# Patient Record
Sex: FEMALE | Race: WHITE | NOT HISPANIC OR LATINO | Employment: FULL TIME | URBAN - METROPOLITAN AREA
[De-identification: names, ages, dates, MRNs, and addresses within clinical notes are randomized per-mention and may not be internally consistent; named-entity substitution may affect disease eponyms.]

---

## 2017-07-20 ENCOUNTER — CONVERSION ENCOUNTER (OUTPATIENT)
Dept: MAMMOGRAPHY | Facility: HOSPITAL | Age: 42
End: 2017-07-20

## 2017-10-23 ENCOUNTER — CONVERSION ENCOUNTER (OUTPATIENT)
Dept: MAMMOGRAPHY | Facility: HOSPITAL | Age: 42
End: 2017-10-23

## 2019-05-14 ENCOUNTER — OFFICE VISIT CONVERTED (OUTPATIENT)
Dept: SURGERY | Facility: CLINIC | Age: 44
End: 2019-05-14
Attending: SURGERY

## 2019-08-09 ENCOUNTER — HOSPITAL ENCOUNTER (OUTPATIENT)
Dept: URGENT CARE | Facility: CLINIC | Age: 44
Discharge: HOME | End: 2019-08-09

## 2019-11-07 ENCOUNTER — HOSPITAL ENCOUNTER (OUTPATIENT)
Dept: MAMMOGRAPHY | Facility: HOSPITAL | Age: 44
Discharge: HOME | End: 2019-11-07
Attending: INTERNAL MEDICINE

## 2019-11-25 ENCOUNTER — HOSPITAL ENCOUNTER (OUTPATIENT)
Dept: GENERAL RADIOLOGY | Facility: HOSPITAL | Age: 44
Discharge: HOME | End: 2019-11-25
Attending: OBSTETRICS & GYNECOLOGY

## 2020-07-14 ENCOUNTER — HOSPITAL ENCOUNTER (OUTPATIENT)
Dept: PHYSICAL THERAPY | Facility: CLINIC | Age: 45
Setting detail: RECURRING SERIES
Discharge: HOME | End: 2020-08-21
Attending: INTERNAL MEDICINE

## 2020-12-31 ENCOUNTER — TELEPHONE CONVERTED (OUTPATIENT)
Dept: UROLOGY | Facility: CLINIC | Age: 45
End: 2020-12-31
Attending: UROLOGY

## 2020-12-31 ENCOUNTER — HOSPITAL ENCOUNTER (OUTPATIENT)
Dept: LAB | Facility: HOSPITAL | Age: 45
Discharge: HOME | End: 2020-12-31
Attending: UROLOGY

## 2020-12-31 LAB
APPEARANCE UR: CLEAR
BILIRUB UR QL: NEGATIVE
COLOR UR: YELLOW
CONV BACTERIA: ABNORMAL
CONV COLLECTION SOURCE (UA): ABNORMAL
CONV HYALINE CASTS IN URINE MICRO: ABNORMAL /[LPF]
CONV UROBILINOGEN IN URINE BY AUTOMATED TEST STRIP: 0.2 {EHRLICHU}/DL (ref 0.1–1)
GLUCOSE UR QL: NEGATIVE MG/DL
HGB UR QL STRIP: ABNORMAL
KETONES UR QL STRIP: NEGATIVE MG/DL
LEUKOCYTE ESTERASE UR QL STRIP: NEGATIVE
NITRITE UR QL STRIP: NEGATIVE
PH UR STRIP.AUTO: 6.5 [PH] (ref 5–8)
PROT UR QL: NEGATIVE MG/DL
RBC #/AREA URNS HPF: ABNORMAL /[HPF]
SP GR UR: 1.01 (ref 1–1.03)
SQUAMOUS SPT QL MICRO: ABNORMAL /[HPF]
WBC #/AREA URNS HPF: ABNORMAL /[HPF]

## 2021-01-02 LAB — BACTERIA UR CULT: NORMAL

## 2021-01-06 ENCOUNTER — HOSPITAL ENCOUNTER (OUTPATIENT)
Dept: PREADMISSION TESTING | Facility: HOSPITAL | Age: 46
Discharge: HOME | End: 2021-01-06
Attending: UROLOGY

## 2021-01-07 LAB — SARS-COV-2 RNA SPEC QL NAA+PROBE: NOT DETECTED

## 2021-01-11 ENCOUNTER — HOSPITAL ENCOUNTER (OUTPATIENT)
Dept: PERIOP | Facility: HOSPITAL | Age: 46
Setting detail: HOSPITAL OUTPATIENT SURGERY
Discharge: HOME | End: 2021-01-11
Attending: UROLOGY

## 2021-01-11 LAB — HCG UR QL: NEGATIVE

## 2021-01-19 LAB
COLOR STONE: NORMAL
COMPN STONE: NORMAL
CONV CA OXALATE MONOHYDRATE: 100 %
CONV CALCULI COMMENT: NORMAL
CONV CALCULI DISCLAIMER: NORMAL
CONV CALCULI NOTE: NORMAL
CONV PHOTO (CALCULI): NORMAL
SIZE STONE: NORMAL MM
WT STONE: 74 MG

## 2021-01-21 ENCOUNTER — HOSPITAL ENCOUNTER (OUTPATIENT)
Dept: MAMMOGRAPHY | Facility: HOSPITAL | Age: 46
Discharge: HOME | End: 2021-01-21
Attending: INTERNAL MEDICINE

## 2021-01-22 ENCOUNTER — PROCEDURE VISIT CONVERTED (OUTPATIENT)
Dept: UROLOGY | Facility: CLINIC | Age: 46
End: 2021-01-22
Attending: UROLOGY

## 2021-02-23 ENCOUNTER — HOSPITAL ENCOUNTER (OUTPATIENT)
Dept: CT IMAGING | Facility: HOSPITAL | Age: 46
Discharge: HOME | End: 2021-02-23
Attending: UROLOGY

## 2021-02-24 ENCOUNTER — CONVERSION ENCOUNTER (OUTPATIENT)
Dept: SURGERY | Facility: CLINIC | Age: 46
End: 2021-02-24

## 2021-02-24 ENCOUNTER — OFFICE VISIT CONVERTED (OUTPATIENT)
Dept: UROLOGY | Facility: CLINIC | Age: 46
End: 2021-02-24
Attending: UROLOGY

## 2021-03-16 ENCOUNTER — TELEPHONE CONVERTED (OUTPATIENT)
Dept: UROLOGY | Facility: CLINIC | Age: 46
End: 2021-03-16
Attending: UROLOGY

## 2021-03-18 ENCOUNTER — HOSPITAL ENCOUNTER (OUTPATIENT)
Dept: PREADMISSION TESTING | Facility: HOSPITAL | Age: 46
Discharge: HOME | End: 2021-03-18
Attending: UROLOGY

## 2021-03-18 LAB
ALBUMIN SERPL-MCNC: 4.5 G/DL (ref 3.5–5)
ALBUMIN/GLOB SERPL: 1.5 {RATIO} (ref 1.4–2.6)
ALP SERPL-CCNC: 81 U/L (ref 42–98)
ALT SERPL-CCNC: 10 U/L (ref 10–40)
ANION GAP SERPL CALC-SCNC: 14 MMOL/L (ref 8–19)
AST SERPL-CCNC: 14 U/L (ref 15–50)
BASOPHILS # BLD AUTO: 0.03 10*3/UL (ref 0–0.2)
BASOPHILS NFR BLD AUTO: 0.4 % (ref 0–3)
BILIRUB SERPL-MCNC: 0.41 MG/DL (ref 0.2–1.3)
BUN SERPL-MCNC: 8 MG/DL (ref 5–25)
BUN/CREAT SERPL: 11 {RATIO} (ref 6–20)
CALCIUM SERPL-MCNC: 9.5 MG/DL (ref 8.7–10.4)
CHLORIDE SERPL-SCNC: 100 MMOL/L (ref 99–111)
CONV ABS IMM GRAN: 0.02 10*3/UL (ref 0–0.2)
CONV CO2: 27 MMOL/L (ref 22–32)
CONV IMMATURE GRAN: 0.2 % (ref 0–1.8)
CONV TOTAL PROTEIN: 7.5 G/DL (ref 6.3–8.2)
CREAT UR-MCNC: 0.71 MG/DL (ref 0.5–0.9)
DEPRECATED RDW RBC AUTO: 45 FL (ref 36.4–46.3)
EOSINOPHIL # BLD AUTO: 0.03 10*3/UL (ref 0–0.7)
EOSINOPHIL # BLD AUTO: 0.4 % (ref 0–7)
ERYTHROCYTE [DISTWIDTH] IN BLOOD BY AUTOMATED COUNT: 13.2 % (ref 11.7–14.4)
GFR SERPLBLD BASED ON 1.73 SQ M-ARVRAT: >60 ML/MIN/{1.73_M2}
GLOBULIN UR ELPH-MCNC: 3 G/DL (ref 2–3.5)
GLUCOSE SERPL-MCNC: 101 MG/DL (ref 65–99)
HCG UR QL: NEGATIVE
HCT VFR BLD AUTO: 42.4 % (ref 37–47)
HGB BLD-MCNC: 14 G/DL (ref 12–16)
LYMPHOCYTES # BLD AUTO: 3.71 10*3/UL (ref 1–5)
LYMPHOCYTES NFR BLD AUTO: 43.8 % (ref 20–45)
MCH RBC QN AUTO: 30.8 PG (ref 27–31)
MCHC RBC AUTO-ENTMCNC: 33 G/DL (ref 33–37)
MCV RBC AUTO: 93.4 FL (ref 81–99)
MONOCYTES # BLD AUTO: 0.38 10*3/UL (ref 0.2–1.2)
MONOCYTES NFR BLD AUTO: 4.5 % (ref 3–10)
NEUTROPHILS # BLD AUTO: 4.3 10*3/UL (ref 2–8)
NEUTROPHILS NFR BLD AUTO: 50.7 % (ref 30–85)
NRBC CBCN: 0 % (ref 0–0.7)
OSMOLALITY SERPL CALC.SUM OF ELEC: 282 MOSM/KG (ref 273–304)
PLATELET # BLD AUTO: 314 10*3/UL (ref 130–400)
PMV BLD AUTO: 10.3 FL (ref 9.4–12.3)
POTASSIUM SERPL-SCNC: 4.1 MMOL/L (ref 3.5–5.3)
RBC # BLD AUTO: 4.54 10*6/UL (ref 4.2–5.4)
SODIUM SERPL-SCNC: 137 MMOL/L (ref 135–147)
WBC # BLD AUTO: 8.47 10*3/UL (ref 4.8–10.8)

## 2021-03-19 LAB — SARS-COV-2 RNA SPEC QL NAA+PROBE: NOT DETECTED

## 2021-03-20 LAB — BACTERIA UR CULT: NORMAL

## 2021-04-01 ENCOUNTER — OFFICE VISIT CONVERTED (OUTPATIENT)
Dept: UROLOGY | Facility: CLINIC | Age: 46
End: 2021-04-01
Attending: UROLOGY

## 2021-04-15 ENCOUNTER — OFFICE VISIT CONVERTED (OUTPATIENT)
Dept: UROLOGY | Facility: CLINIC | Age: 46
End: 2021-04-15
Attending: UROLOGY

## 2021-04-15 ENCOUNTER — CONVERSION ENCOUNTER (OUTPATIENT)
Dept: SURGERY | Facility: CLINIC | Age: 46
End: 2021-04-15

## 2021-05-10 NOTE — H&P
"   History and Physical      Patient Name: Geri Bowie   Patient ID: [id number]   Sex: Female   YOB: 1975    Primary Care Provider: Nicole Tapia MD   Referring Provider: Nicole Tapia MD    Visit Date: December 31, 2020    Provider: Emily Sanders MD   [place] [facility]   Location Address: [address] [place] [address]  [id number]   Location Phone: [phone]          Chief Complaint     Presents with urologic concern    Telephone Visit- presents for evaluation via telephone.   Verbal consent obtained before beginning visit.   The following staff were present during this visit: Nilam Hopkins LPN  Total time for encounter: 15 minutes       History Of Present Illness     45-year-old lady presents for further evaluation after ER presentation for left-sided back pain.  Diagnosed with distal 3 mm left ureteral stone. Instructed to follow-up with urology.  Patient thought she was \"over it.\"  However her symptoms returned approximately a week and a half later and she has been dealing with significant intermittent pain ever since.  Thinks that stone is still there.  Complains of severe urinary urgency, pressure with mal odor for the past 2 days. Also c/o right sided pain but will change to left-sided pain on occasion.  Describes pain as moderate to severe at times; intermittent; sharp, stabbing; denies significant radiation.  No exacerbating factors.  No associated fever, no nausea.     No prior h/o nephrolithiasis.    ________________  CT abdomen pelvis, without contrast, 12/5/2020:  3 mm calculus in the distal right ureter without significant hydronephrosis; Left nephrolithiasis. 1.8 cm partially exophytic indeterminate mass anteriorly in the lower pole of the right kidney.  This is indeterminate without intravenous contrast.  Recommend an MRI of the abdomen with and without intravenous gadolinium for further evaluation.    Labs, 12/5/2020:  WBC 9.99  Creatinine: " 0.71  UA TNTC RBCs, negative nitrite, negative leukocyte esterase (no culture sent)       Past Medical History  Allergic rhinitis, chronic; Ankle fracture, right, closed, with routine healing, subsequent encounter; Blood In Stool; Breast lump; Closed displaced oblique fracture of shaft of right tibia with routine healing, subsequent encounter; Headache; High cholesterol; Hypothyroidism; Nausea; Other closed fracture of distal end of right fibula with routine healing, subsequent encounter; Pain: Ankle         Past Surgical History  Ankle surgery; Appendectomy; Colon; Colonoscopy         Medication List  Stelara 45 mg/0.5 mL subcutaneous syringe; Vitamin D2 1,250 mcg (50,000 unit) oral capsule         Allergy List  PENICILLINS       Allergies Reconciled  Family Medical History  -None; -Father's Family History Unknown; -Mother's Family History Unknown; *No Known Family History         Social History  Alcohol (Current some day); Caffeine (Current - status unknown); Second hand smoke exposure (Current some day); Tobacco (Current every day)         Review of Systems  · Constitutional  o Denies  o : fever, chills  · Gastrointestinal  o Denies  o : nausea, vomiting          Results       [facility]      PACS RADIOLOGY REPORT    Patient: KALYAN CUMMINGS Acct: #[id number] Report: #[id number]    UNIT #: [id number]  DOS: 20 1549  INSURANCE:[employer] ORDER #:[id number]  LOCATION:[facility]   : 1975    PROVIDERS  ADMITTING:   ATTENDING:   FAMILY:  Nicole Tapia ORDERING:  GAYLE BERNAL   OTHER:  DICTATING:  Ankit Macdonald MD, IV    REQ #:[id number]   EXAM:ABDPELWO - CT ABDOMEN PELVIS wo CONTRAST  REASON FOR EXAM:  Abdominal Pain  REASON FOR VISIT:  ANNEX-CRUZ/LWL LUNG PN    *******Signed******     PROCEDURE: CT ABDOMEN PELVIS WITHOUT CONTRAST     COMPARISON: [facility], US, PELVIC TRANSVAGINAL, 2019, 12:35.  [facility]  [facility], CT, ABDOMEN-PELVIS W, 10/19/2007, 19:41.     INDICATIONS: Abdominal Pain     PROTOCOL:   Standard imaging protocol performed      RADIATION:   DLP: 534.5mGy*cm    Automated exposure control was utilized to minimize radiation dose.      TECHNIQUE: Axial images of the abdomen and pelvis without intravenous or oral contrast.      FINDINGS:     ABDOMEN:  The lung bases are clear.  There are hypodense hepatic masses measuring up to 2 cm.  They are   probably cysts.  The spleen, pancreas and adrenal glands are normal.  There is a 1.8 cm partially   exophytic indeterminate mass anteriorly in the lower pole of the right kidney.  There are a few   calcifications in the left kidney measuring 1 mm and 7 mm.  There are no inflammatory changes   around the gallbladder.       PELVIS:  There is a 3 mm calculus in the distal right ureter without significant hydronephrosis.  Prior   appendectomy.  No evidence of bowel obstruction, perforation or abscess.  There is a 1.4 cm area of   calcification posteriorly in the uterine cervix.  No CT evidence of colitis.  The abdominal aorta   has a normal caliber.  No acute osseous abnormalities are identified.     IMPRESSION:       1. 3 mm calculus in the distal right ureter without significant hydronephrosis.     2. Left nephrolithiasis.     3. 1.8 cm partially exophytic indeterminate mass anteriorly in the lower pole of the right kidney.    This is indeterminate without intravenous contrast.  Recommend an MRI of the abdomen with and   without intravenous gadolinium for further evaluation.     4. 1.4 cm area of calcification posteriorly in the uterine cervix.  Recommend an MRI of the pelvis   with and without intravenous gadolinium for further evaluation.          AGUSTINA SMITH MD         Electronically Signed and Approved By: AGUSTINA SMITH MD on 12/05/2020 at 16:59                     Until signed, this is an unconfirmed preliminary report that may contain  errors and is  subject to change.                CYNDI:  D:12/05/20 1659         Assessment  · Ureteral stone     592.1/N20.1  · Nephrolithiasis     592.0/N20.0  · Dysuria     788.1/R30.0    Problems Reconciled  Plan  · Orders  o Physician Telephone Evaluation, 11-20 minutes (66860) - 592.1/N20.1, 592.0/N20.0, 788.1/R30.0 - 12/31/2020  · Medications  o Medications have been Reconciled  o Transition of Care or Provider Policy  · Instructions  o Electronically Identified Patient Education Materials Provided Electronically     CT scan and ER records reviewed, discussed with patient  Focus of encounter was on nephrolithiasis, ureteral stone, and urinary symptoms.  Will discuss lower pole right renal lesion at subsequent visit.    Current urinary symptoms concerning for urinary tract infection; warrants further evaluation via UA and culture.  Patient to obtain Azo for symptomatic relief  Discussed that should she experience fever over 101, needs evaluation in ER otherwise will notify patient of results  Offered to begin empiric treatment for UTI however, patient wishes to await culture results.    Right ureteral calculi-approximately 3 mm in size.  Given persistent symptoms, patient believed not to have passed stone.  Warrants definitive stone management at this point after completion of trial of passage.  Also with left nephrolithiasis of a size which she is unlikely to pass and complaint of left-sided flank pain.  Discussed that intrarenal stones typically do not cause symptoms however due to the size and location of the stone, recommend stone management of the left side as well.      Obtain results of urine culture and notify patient; initiate treatment as indicated.  Plan for OR after completion of UTI treatment.  Will schedule cystoscopy, bilateral retrograde pyelogram, ureteroscopy, laser lithotripsy, stent placement.  Risks, benefits and alternatives were discussed with patient including bleeding, infection, damage to  surrounding structures, stone recurrence, stricture.  Patient also notes understanding that if unable to reach the level of the stone or if significant purulent discharge noted upon initiation of procedure that stent will be placed in definitive stone management will be deferred until the collecting system is optimized.    Counseled to return to ER immediately if fever, intolerance to PO; persistent emesis, or uncontrollable pain    Will schedule OR after completion of treatment for UTI   All question addressed at this time.             Electronically Signed by: Emily Sanders MD -Author on December 31, 2020 01:10:24 PM

## 2021-05-10 NOTE — PROCEDURES
Procedure Note      Patient Name: Geri Bowie   Patient ID: [id number]   Sex: Female   YOB: 1975    Primary Care Provider: Nicole Tapia MD   Referring Provider: Nicole Tapia MD    Visit Date: January 22, 2021    Provider: Jony Montilla MD   Location: [facility]   Location Address: [address] [place] [address]  [id number]   Location Phone: [phone]          Cystoscopy with Stent Removal  Pre-Procedure Diagnosis: Nephrolithiasis   Post-Procedural Diagnosis: Same   Procedure Performed: Cystoscopy with Bilateral ureteral Stent Removal   Description of the Procedure:  Geri Bowie was appropriately identified and brought to the cystoscopy suite. A timeout was undertaken documenting the correct patient, site, and procedure. The patient was prepped in a normal sterile fashion. A flexible cystoscope was then introduced into the bladder. The stent was identified and grasped with endoscopic graspers. The entire stent was removed and passed off the field. Patient tolerated the procedure well. There were no intraprocedural complications.        B/l Ureteral stents were removed today           Assessment  · Nephrolithiasis     592.0/N20.0      Plan  · Orders  o Cystoscopy with Stent Removal (84178) - 592.0/N20.0 - 01/22/2021  · Medications  o Medications have been Reconciled  o Transition of Care or Provider Policy  · Instructions  o Electronically Identified Patient Education Materials Provided Electronically  · Referrals  o ID: [id number] Date: 12/29/2020 Type: Inbound  Specialty: Urology     Fu Dr. Sam in 1 month with renal ultrasound             Electronically Signed by: Jony Montilla MD -Author on January 22, 2021 03:53:08 PM

## 2021-05-14 VITALS — RESPIRATION RATE: 16 BRPM | HEIGHT: 67 IN | WEIGHT: 191.5 LBS | BODY MASS INDEX: 30.06 KG/M2

## 2021-05-14 VITALS — WEIGHT: 190 LBS | BODY MASS INDEX: 29.82 KG/M2 | RESPIRATION RATE: 12 BRPM | HEIGHT: 67 IN

## 2021-05-14 NOTE — PROGRESS NOTES
"   Progress Note      Patient Name: Geri Bowie   Patient ID: [id number]   Sex: Female   YOB: 1975    Primary Care Provider: Nicole Tapia MD   Referring Provider: Nicole Tapia MD    Visit Date: April 15, 2021    Provider: Emily Sanders MD   Location: [facility]   Location Address: [address] [place] [address]  [id number]   Location Phone: [phone]          Chief Complaint     Patient presents for urologic concerns       History Of Present Illness     45-year-old lady presents for further evaluation after ER presentation for left-sided back pain.  Diagnosed with distal 3 mm left ureteral stone. Instructed to follow-up with urology.  Patient thought she was \"over it.\"  However her symptoms returned approximately a week and a half later and she has been dealing with significant intermittent pain ever since.  Thinks that stone is still there.  Complains of severe urinary urgency, pressure with mal odor for the past 2 days. Also c/o right sided pain but will change to left-sided pain on occasion.  Describes pain as moderate to severe at times; intermittent; sharp, stabbing; denies significant radiation.  No exacerbating factors.  No associated fever, no nausea.     No prior h/o nephrolithiasis.    Update 2/24/2021: Patient presents for follow-up status post bilateral retrograde, bilateral ureteroscopy, left left laser lithotripsy for renal stone, 1/11/2021.  Patient subsequently underwent stent removal in office with Dr. Montilla and presents with CT scan for further evaluation of right renal lesion prior to today's appointment.   Patient states that she has been doing well since her procedure.  Currently denies any urinary symptoms.  Denies hematuria or flank pain.  No complaints today.    Update 4/1/2021: Patient presents for follow-up after robotic partial nephrectomy last week.  Patient reports some generalized fatigue and flank pain which is improving.  Has " issues taking the pain medication as it causes nausea despite Compazine.  Has not taken much of the pain medication due to the concern of nausea.  States that when she gets up she is at times feeling dizzy; does report history of vertigo preop but had not been experiencing it for quite some time.  Denies intolerance to p.o.; denies constipation; believes she is hydrating well.    Update 4/15/2021: Patient presents for postop follow-up robotic right partial nephrectomy.  Patient states she is doing better.  Abdomen occasionally still sore but improving.  No specific complaints today.    ________________  Right partial nephrectomy pathology, 3/23/2021: Clear-cell renal cell carcinoma, pT1a, margin negative    CT scan abdomen pelvis with and without contrast, 2/23/2021: 2.3 x 1.9 mixed cystic and solid partially exophytic mid to lower pole right renal cortical mass suspicious for cystic renal cell carcinoma.    Stone analysis, 1/11/2021: 100% calcium oxalate    CT abdomen pelvis, without contrast, 12/5/2020:  3 mm calculus in the distal right ureter without significant hydronephrosis; Left nephrolithiasis. 1.8 cm partially exophytic indeterminate mass anteriorly in the lower pole of the right kidney.  This is indeterminate without intravenous contrast.  Recommend an MRI of the abdomen with and without intravenous gadolinium for further evaluation.    Labs, 12/5/2020:  WBC 9.99  Creatinine: 0.71  UA TNTC RBCs, negative nitrite, negative leukocyte esterase (no culture sent).       Past Medical History  Allergic rhinitis, chronic; Ankle fracture, right, closed, with routine healing, subsequent encounter; Blood In Stool; Breast lump; Closed displaced oblique fracture of shaft of right tibia with routine healing, subsequent encounter; Headache; High cholesterol; Hypothyroidism; Nausea; Other closed fracture of distal end of right fibula with routine healing, subsequent encounter; Pain: Ankle         Past Surgical  "History  Ankle surgery; Appendectomy; Colon; Colonoscopy; Cystoscopic extraction of ureteral stone         Allergy List  PENICILLINS       Allergies Reconciled  Family Medical History  -None; -Father's Family History Unknown; -Mother's Family History Unknown; *No Known Family History         Social History  Alcohol (Current some day); Caffeine (Current - status unknown); Second hand smoke exposure (Current some day); Tobacco (Current every day)         Review of Systems  · Constitutional  o Denies  o : chills, fever  · Gastrointestinal  o Denies  o : nausea, vomiting      Vitals  Date Time BP Position Site L\R Cuff Size HR RR TEMP (F) WT  HT  BMI kg/m2 BSA m2 O2 Sat FR L/min FiO2 HC       04/15/2021 02:49 PM       12  190lbs 0oz 5'  7\" 29.76 2.02             Physical Examination  · Constitutional  o Appearance  o : Well nourished, well developed patient in no acute distress.  · Eyes  o Conjunctivae  o : Conjunctivae normal  o Sclerae  o : Sclerae white  · Ears, Nose, Mouth and Throat  o Ears  o :   § Hearing  § : Intact to conversational voice both ears  § Ears  § : Normal  o Nose  o :   § External Nose  § : Appearance normal  · Gastrointestinal  o Abdominal Examination  o : Soft, nondistended, nontender; incisions clean, dry, intact, healing well  · Skin and Subcutaneous Tissue  o General Inspection  o : No rashes, lesions, or areas of discoloration present  o General Palpation  o : Skin Turgor Normal  · Neurologic  o Mental Status Examination  o :   § Orientation  § : Alert and Oriented X3  o Gait and Station  o :   § Gait Screening  § : Ambulating without difficulty  · Psychiatric  o Mood and Affect  o : Mood normal, affect appropriate              Assessment  · Calcium oxalate calculus     275.49/E83.59  · Renal cell carcinoma     189.0/C64.9    Problems Reconciled  Plan  · Orders  o CT Abdomen and Pelvis (with and without Contrast) with Bosniak Class and delayed images ([id number]) - 189.0/C64.9 - " 10/17/2021  o Chest xray 2 views [facility] Preferred View ([id number]) - 189.0/C64.9 - 10/17/2021  o Urinalysis with Reflex Microscopy if abnormal ([facility]) ([id number]) - 189.0/C64.9 - 10/17/2021  o BMP Non-fasting [facility] ([id number]) - 189.0/C64.9 - 10/17/2021  o CBC with Auto Diff [facility] ([id number]) - 189.0/C64.9 - 10/17/2021  · Medications  o Medications have been Reconciled  o Transition of Care or Provider Policy  · Instructions  o Electronically Identified Patient Education Materials Provided Electronically  · Referrals  o ID: [id number] Date: 12/29/2020 Type: Inbound  Specialty: Urology     doing well; slowly increase activity as tolerated  right cell renal cell carcinoma, pT1a, margin negative.    Plan for first surveillance studies in 6 months per AUA and NCCN guidelines    Follow-up 6 mo studies prior or earlier as needed  All question addressed             Electronically Signed by: Emily Sanders MD -Author on April 17, 2021 03:01:40 PM

## 2021-05-14 NOTE — PROGRESS NOTES
"   Progress Note      Patient Name: Geri Bowie   Patient ID: [id number]   Sex: Female   YOB: 1975    Primary Care Provider: Nicole Tapia MD   Referring Provider: Nicole Tapia MD    Visit Date: April 1, 2021    Provider: Emily Sanders MD   Location: [facility]   Location Address: [address] [place] [address]  [id number]   Location Phone: [phone]          Chief Complaint     Patient presents for urologic concerns       History Of Present Illness     45-year-old lady presents for further evaluation after ER presentation for left-sided back pain.  Diagnosed with distal 3 mm left ureteral stone. Instructed to follow-up with urology.  Patient thought she was \"over it.\"  However her symptoms returned approximately a week and a half later and she has been dealing with significant intermittent pain ever since.  Thinks that stone is still there.  Complains of severe urinary urgency, pressure with mal odor for the past 2 days. Also c/o right sided pain but will change to left-sided pain on occasion.  Describes pain as moderate to severe at times; intermittent; sharp, stabbing; denies significant radiation.  No exacerbating factors.  No associated fever, no nausea.     No prior h/o nephrolithiasis.    Update 2/24/2021: Patient presents for follow-up status post bilateral retrograde, bilateral ureteroscopy, left left laser lithotripsy for renal stone, 1/11/2021.  Patient subsequently underwent stent removal in office with Dr. Montilla and presents with CT scan for further evaluation of right renal lesion prior to today's appointment.   Patient states that she has been doing well since her procedure.  Currently denies any urinary symptoms.  Denies hematuria or flank pain.  No complaints today.    Update 4/1/2021: Patient presents for follow-up after robotic partial nephrectomy last week.  Patient reports some generalized fatigue and flank pain which is improving.  Has " issues taking the pain medication as it causes nausea despite Compazine.  Has not taken much of the pain medication due to the concern of nausea.  States that when she gets up she is at times feeling dizzy; does report history of vertigo preop but had not been experiencing it for quite some time.  Denies intolerance to p.o.; denies constipation; believes she is hydrating well.    ________________  Right partial nephrectomy pathology, 3/23/2021: Clear-cell renal cell carcinoma, pT1a, margin negative    CT scan abdomen pelvis with and without contrast, 2/23/2021: 2.3 x 1.9 mixed cystic and solid partially exophytic mid to lower pole right renal cortical mass suspicious for cystic renal cell carcinoma.    Stone analysis, 1/11/2021: 100% calcium oxalate    CT abdomen pelvis, without contrast, 12/5/2020:  3 mm calculus in the distal right ureter without significant hydronephrosis; Left nephrolithiasis. 1.8 cm partially exophytic indeterminate mass anteriorly in the lower pole of the right kidney.  This is indeterminate without intravenous contrast.  Recommend an MRI of the abdomen with and without intravenous gadolinium for further evaluation.    Labs, 12/5/2020:  WBC 9.99  Creatinine: 0.71  UA TNTC RBCs, negative nitrite, negative leukocyte esterase (no culture sent).       Past Medical History  Allergic rhinitis, chronic; Ankle fracture, right, closed, with routine healing, subsequent encounter; Blood In Stool; Breast lump; Closed displaced oblique fracture of shaft of right tibia with routine healing, subsequent encounter; Headache; High cholesterol; Hypothyroidism; Nausea; Other closed fracture of distal end of right fibula with routine healing, subsequent encounter; Pain: Ankle         Past Surgical History  Ankle surgery; Appendectomy; Colon; Colonoscopy; Cystoscopic extraction of ureteral stone         Allergy List  PENICILLINS       Allergies Reconciled  Family Medical History  -None; -Father's Family History  Unknown; -Mother's Family History Unknown; *No Known Family History         Social History  Alcohol (Current some day); Caffeine (Current - status unknown); Second hand smoke exposure (Current some day); Tobacco (Current every day)         Review of Systems  · Constitutional  o Denies  o : chills, fever  · Gastrointestinal  o Denies  o : nausea, vomiting      Physical Examination  · Constitutional  o Appearance  o : Well nourished, well developed patient in no acute distress.  · Eyes  o Conjunctivae  o : Conjunctivae normal  o Sclerae  o : Sclerae white  · Ears, Nose, Mouth and Throat  o Ears  o :   § Hearing  § : Intact to conversational voice both ears  § Ears  § : Normal  o Nose  o :   § External Nose  § : Appearance normal  · Skin and Subcutaneous Tissue  o General Inspection  o : No rashes, lesions, or areas of discoloration present; no appreciable pallor, good generalized coloration  o General Palpation  o : Skin Turgor Normal  · Neurologic  o Mental Status Examination  o :   § Orientation  § : Alert and Oriented X3  o Gait and Station  o :   § Gait Screening  § : Ambulating without difficulty  · Psychiatric  o Mood and Affect  o : Mood normal, affect appropriate     Abdomen: Incisions clean dry and intact, healing well; no right-sided ecchymosis or erythema               Assessment  · Calcium oxalate calculus     275.49/E83.59  · Renal cell carcinoma     189.0/C64.9    Problems Reconciled  Plan  · Medications  o Medications have been Reconciled  o Transition of Care or Provider Policy  · Instructions  o Electronically Identified Patient Education Materials Provided Electronically  · Referrals  o ID: [id number] Date: 12/29/2020 Type: Inbound  Specialty: Urology     Right renal mass pathology discussed with patient and  at lengthpatient with clear-cell renal cell carcinoma, pT1a, margin negative.    Plan for first surveillance studies in 6 months    Discussed expected postoperative course with patient and   at length; provided reassurance as surgery was just last week.  Discussed adjusting medications and encouraged her to take medication as well as nausea medication as needed.  Patient to monitor vertigo, discussed that this is not a typical complaint after robotic partial nephrectomy, patient appears to have good color no signs of anemia but, should symptoms persist or worsen could consider obtaining labs.  Also given prior history of vertigo, should it persist or worsen encourage patient to consider visit with PCP for further management discussion.    Patient continue activity limitations until follow-up  Follow-up 2 weeks or earlier as needed  All question addressed             Electronically Signed by: Emily Sanders MD -Author on April 2, 2021 09:26:15 AM

## 2021-05-14 NOTE — PROGRESS NOTES
"   Progress Note      Patient Name: Geri Bowie   Patient ID: [id number]   Sex: Female   YOB: 1975    Primary Care Provider: Nicole Tapia MD   Referring Provider: Nicole Tapia MD    Visit Date: March 16, 2021    Provider: Emily Sanders MD   [place] [facility]   Location Address: [address] [place] [address]  [id number]   Location Phone: [phone]          Chief Complaint     Patient presents for urologic concerns    Telephone Visit- presents for evaluation via telephone.   Verbal consent obtained before beginning visit.   The following staff were present during this visit: Nilam Hopkins LPN  Total time for encounter: 12 minutes       History Of Present Illness     45-year-old lady presents for further evaluation after ER presentation for left-sided back pain.  Diagnosed with distal 3 mm left ureteral stone. Instructed to follow-up with urology.  Patient thought she was \"over it.\"  However her symptoms returned approximately a week and a half later and she has been dealing with significant intermittent pain ever since.  Thinks that stone is still there.  Complains of severe urinary urgency, pressure with mal odor for the past 2 days. Also c/o right sided pain but will change to left-sided pain on occasion.  Describes pain as moderate to severe at times; intermittent; sharp, stabbing; denies significant radiation.  No exacerbating factors.  No associated fever, no nausea.     No prior h/o nephrolithiasis.    Update 2/24/2021: Patient presents for follow-up status post bilateral retrograde, bilateral ureteroscopy, left left laser lithotripsy for renal stone, 1/11/2021.  Patient subsequently underwent stent removal in office with Dr. Montilla and presents with CT scan for further evaluation of right renal lesion prior to today's appointment.   Patient states that she has been doing well since her procedure.  Currently denies any urinary symptoms.  Denies " hematuria or flank pain.  No complaints today.    Update 3/16/2021: Patient presents for follow-up; plan for right partial nephrectomy; patient with questions.  Patient notes since last discussion, she has developed lower suprapubic abdominal pain of unknown etiology.  Patient notes this to be mild to moderate at times; patient also notes that she has had her period off and on every 2 weeks for some time now.  Plans to obtain follow-up with GYN.  Patient is on a biologic for her psoriasis which she receives every 12 weeks; wonders that this will impact her surgery in any way.  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      ________________  CT scan abdomen pelvis with and without contrast, 2/23/2021: 2.3 x 1.9 mixed cystic and solid partially exophytic mid to lower pole right renal cortical mass suspicious for cystic renal cell carcinoma.    Stone analysis, 1/11/2021: 100% calcium oxalate    CT abdomen pelvis, without contrast, 12/5/2020:  3 mm calculus in the distal right ureter without significant hydronephrosis; Left nephrolithiasis. 1.8 cm partially exophytic indeterminate mass anteriorly in the lower pole of the right kidney.  This is indeterminate without intravenous contrast.  Recommend an MRI of the abdomen with and without intravenous gadolinium for further evaluation.    Labs, 12/5/2020:  WBC 9.99  Creatinine: 0.71  UA TNTC RBCs, negative nitrite, negative leukocyte esterase (no culture sent).       Past Medical History  Allergic rhinitis, chronic; Ankle fracture, right, closed, with routine healing, subsequent encounter; Blood In Stool; Breast lump; Closed displaced oblique fracture of shaft of right tibia with routine healing, subsequent encounter; Headache; High cholesterol; Hypothyroidism; Nausea; Other closed fracture of distal end of right fibula with routine healing, subsequent encounter; Pain: Ankle         Past  Surgical History  Ankle surgery; Appendectomy; Colon; Colonoscopy; Cystoscopic extraction of ureteral stone         Allergy List  PENICILLINS       Allergies Reconciled  Family Medical History  -None; -Father's Family History Unknown; -Mother's Family History Unknown; *No Known Family History         Social History  Alcohol (Current some day); Caffeine (Current - status unknown); Second hand smoke exposure (Current some day); Tobacco (Current every day)         Review of Systems  · Constitutional  o Denies  o : fever, chills  · Gastrointestinal  o Denies  o : nausea, vomiting          Results     [facility]      PACS RADIOLOGY REPORT    Patient: KALYAN CUMMINGS Acct: #[id number] Report: #[id number]    UNIT #: [id number]  DOS: 21 1213  INSURANCE:[employer] ORDER #:[id number]  LOCATION:CT   : 1975    PROVIDERS  ADMITTING:   ATTENDING: GEO HARRELL  FAMILY:  Nicole Tapia ORDERING:  GEO HARRELL   OTHER:  DICTATING:  Lewis Galo MD    REQ #:[id number]   EXAM:ABDPELWOW - CT ABDOMEN PELVIS wwo CONTRAST  REASON FOR EXAM:  RT RENAL MASS  REASON FOR VISIT:  RT RENAL MASS    *******Signed******     PROCEDURE: CT ABDOMEN PELVIS WITHOUT AND WITH CONTRAST     COMPARISON: [facility], CR, CHEST AP/PA 1 VIEW, 2020, 14:42.  [facility], CT, ABD PEL W/O CONTRAST, 2020, 16:42.     INDICATIONS: RT RENAL MASS     TECHNIQUE: After obtaining the patient's consent, CT images of the abdomen were created without and   with non-ionic intravenous contrast material, and CT images of the pelvis were obtained with   non-ionic intravenous contrast material.       PROTOCOL:   Standard imaging protocol performed      RADIATION:   DLP: 1593mGy*cm    Automated exposure control was utilized to minimize radiation dose.   CONTRAST: 100cc Isovue 370 I.V.     FINDINGS:   Bilateral lung bases are clear.  There may be a small  hiatal hernia.  Stomach is within normal   limits.  The multiple circumscribed low-density hepatic masses are stable most compatible with   hepatic cysts.  The largest exophytic to the left hepatic lobe.     Gallbladder identified. Enhancement pattern of the spleen, pancreas, and adrenal glands within   normal limits and normal caliber abdominal aorta.  Previous is seen left renal pelvic calculus is no longer visualized.  Left renal enhancement   pattern within normal limits.  2.3 x 1.9 cm mixed cystic and solid partially exophytic mid to lower   pole right renal cortical mass suspicious for cystic renal cell carcinoma.  Prominent bilateral   renal pelves.  Multiple nonenlarged retroperitoneal nodes the largest 0.8 cm interposed between the   IVC and aorta (series 3/image 66).  Nonobstructive bowel gas pattern without pericolonic inflammatory changes .  Appendix nonvisualized   likely surgically absent.1      No free fluid, inflammatory changes or organizing collections in the abdomen or pelvis.      Pelvic structures within normal limits for the patient's stated age. No suspicious osseous   abnormalities.     CONCLUSION:   1. 2.3 x 1.9 mixed cystic and solid partially exophytic mid to lower pole right renal cortical mass   suspicious for cystic renal cell carcinoma.  Tissue diagnosis would be definitive.  PET CT as   clinically indicated.  2. Small retroperitoneal nodes the largest 0.8 cm.   3. Evidence of appendectomy.  4. Above findings communicated to Dr. Emily Dwyer on 2/23/2021 at 1345 hours.              Lewis Galo M.D.         Electronically Signed and Approved By: Lewis Galo M.D. on 2/23/2021 at 13:48                     Until signed, this is an unconfirmed preliminary report that may contain  errors and is subject to change.                RAM:  D:02/23/21 1336         Assessment  · Renal mass     593.9/N28.89  · Calcium oxalate calculus     275.49/E83.59    Problems  Reconciled  Plan  · Orders  o Physician Telephone Evaluation, 11-20 minutes (70989, [id number], [id number]) - 593.9/N28.89 - 03/16/2021  · Medications  o Medications have been Reconciled  o Transition of Care or Provider Policy  · Instructions  o Electronically Identified Patient Education Materials Provided Electronically  · Referrals  o ID: [id number] Date: 12/29/2020 Type: Inbound  Specialty: Urology     Had discussion with patient regarding robotic assisted right partial nephrectomy; risks, benefits, and alternatives of the procedure.  Expected postoperative course discussed with patient at length as well as activity limitations postoperatively.  Discussed that patient will likely be on limited activity restriction, details of this discussed,  for 4 to 6 weeks postoperatively, but would be expected to walk and perform activities of daily living postop day 1.  Depending on the nature of her grandson, could presumably be with him and continue to watch over him in the perioperative period.     Lower abdominal pain, reviewed again patient CT scan imaging; do not believe etiology for her lower abdominal pain to be urologic in origin.  Provided reassurance.  No significant nephrolithiasis.  No abnormalities of the bladder identified.  Given recent menorrhagia as well as lower abdominal pain; recommend follow-up with GYN for potential further evaluation    Patient discussed concern as she is on a biologic for her psoriasis; due to have her injection prior to surgery.  Do not believe this to impede surgery or place her at higher risk; plan to provide her with prophylactic antibiotics per AUA guidelines.  Will discuss further with partners as well as pharmacy and notify patient should there be any further recommendations.    Plan to proceed with robotic right partial nephrectomy as scheduled  All questions addressed             Electronically Signed by: Emily Sanders MD -Author on March 17, 2021 12:01:15 AM

## 2021-05-14 NOTE — PROGRESS NOTES
"   Progress Note      Patient Name: Geri Bowie   Patient ID: [id number]   Sex: Female   YOB: 1975    Primary Care Provider: Nicole Tapia MD   Referring Provider: Nicole Tapia MD    Visit Date: February 24, 2021    Provider: Emily Sanders MD   Location: [facility]   Location Address: [address] [place] [address]  [id number]   Location Phone: [phone]          Chief Complaint     Patient presents for urologic concerns       History Of Present Illness     45-year-old lady presents for further evaluation after ER presentation for left-sided back pain.  Diagnosed with distal 3 mm left ureteral stone. Instructed to follow-up with urology.  Patient thought she was \"over it.\"  However her symptoms returned approximately a week and a half later and she has been dealing with significant intermittent pain ever since.  Thinks that stone is still there.  Complains of severe urinary urgency, pressure with mal odor for the past 2 days. Also c/o right sided pain but will change to left-sided pain on occasion.  Describes pain as moderate to severe at times; intermittent; sharp, stabbing; denies significant radiation.  No exacerbating factors.  No associated fever, no nausea.     No prior h/o nephrolithiasis.    Update 2/24/2021: Patient presents for follow-up status post bilateral retrograde, bilateral ureteroscopy, left left laser lithotripsy for renal stone, 1/11/2021.  Patient subsequently underwent stent removal in office with Dr. Montilla and presents with CT scan for further evaluation of right renal lesion prior to today's appointment.   Patient states that she has been doing well since her procedure.  Currently denies any urinary symptoms.  Denies hematuria or flank pain.  No complaints today.    ________________  CT scan abdomen pelvis with and without contrast, 2/23/2021: 2.3 x 1.9 mixed cystic and solid partially exophytic mid to lower pole right renal cortical " "mass suspicious for cystic renal cell carcinoma.    Stone analysis, 1/11/2021: 100% calcium oxalate    CT abdomen pelvis, without contrast, 12/5/2020:  3 mm calculus in the distal right ureter without significant hydronephrosis; Left nephrolithiasis. 1.8 cm partially exophytic indeterminate mass anteriorly in the lower pole of the right kidney.  This is indeterminate without intravenous contrast.  Recommend an MRI of the abdomen with and without intravenous gadolinium for further evaluation.    Labs, 12/5/2020:  WBC 9.99  Creatinine: 0.71  UA TNTC RBCs, negative nitrite, negative leukocyte esterase (no culture sent).       Past Medical History  Allergic rhinitis, chronic; Ankle fracture, right, closed, with routine healing, subsequent encounter; Blood In Stool; Breast lump; Closed displaced oblique fracture of shaft of right tibia with routine healing, subsequent encounter; Headache; High cholesterol; Hypothyroidism; Nausea; Other closed fracture of distal end of right fibula with routine healing, subsequent encounter; Pain: Ankle         Past Surgical History  Ankle surgery; Appendectomy; Colon; Colonoscopy; Cystoscopic extraction of ureteral stone         Allergy List  PENICILLINS         Family Medical History  -None; -Father's Family History Unknown; -Mother's Family History Unknown; *No Known Family History         Social History  Alcohol (Current some day); Caffeine (Current - status unknown); Second hand smoke exposure (Current some day); Tobacco (Current every day)         Review of Systems  · Constitutional  o Denies  o : fever, chills  · Gastrointestinal  o Denies  o : nausea, vomiting      Vitals  Date Time BP Position Site L\R Cuff Size HR RR TEMP (F) WT  HT  BMI kg/m2 BSA m2 O2 Sat FR L/min FiO2 HC       02/24/2021 10:11 AM       16  191lbs 8oz 5'  7\" 29.99 2.03             Physical Examination  · Constitutional  o Appearance  o : Well developed, well nourished, alert, in no acute distress.  · Head and " Face  o Head  o :   § Inspection  § : Normocephalic, atraumatic  o Face  o :   § Inspection  § : No facial lesions  · Neurologic  o Mental Status Examination  o :   § Orientation  § : alert and oriented x 3  o Gait and Station  o :   § Gait Screening  § : Ambulating wiithout difficulty  · Psychiatric  o Mood and Affect  o : mood normal, affect appropriate          Results     [facility]      PACS RADIOLOGY REPORT    Patient: KALYAN CUMMINGS Acct: #[id number] Report: #[id number]    UNIT #: [id number]  DOS: 21 1213  INSURANCE:[employer] ORDER #:CT [id number]  LOCATION:CT   : 1975    PROVIDERS  ADMITTING:   ATTENDING: GEO HARRELL  FAMILY:  Nicole Tapia ORDERING:  GEO HARRELL   OTHER:  DICTATING:  Lewis Galo MD    REQ #:[id number]   EXAM:ABDPELWOW - CT ABDOMEN PELVIS wwo CONTRAST  REASON FOR EXAM:  RT RENAL MASS  REASON FOR VISIT:  RT RENAL MASS    *******Signed******     PROCEDURE: CT ABDOMEN PELVIS WITHOUT AND WITH CONTRAST     COMPARISON: [facility], CR, CHEST AP/PA 1 VIEW, 2020, 14:42.  [facility], CT, ABD PEL W/O CONTRAST, 2020, 16:42.     INDICATIONS: RT RENAL MASS     TECHNIQUE: After obtaining the patient's consent, CT images of the abdomen were created without and   with non-ionic intravenous contrast material, and CT images of the pelvis were obtained with   non-ionic intravenous contrast material.       PROTOCOL:   Standard imaging protocol performed      RADIATION:   DLP: 1593mGy*cm    Automated exposure control was utilized to minimize radiation dose.   CONTRAST: 100cc Isovue 370 I.V.     FINDINGS:   Bilateral lung bases are clear.  There may be a small hiatal hernia.  Stomach is within normal   limits.  The multiple circumscribed low-density hepatic masses are stable most compatible with   hepatic cysts.  The largest exophytic to the left hepatic lobe.     Gallbladder  identified. Enhancement pattern of the spleen, pancreas, and adrenal glands within   normal limits and normal caliber abdominal aorta.  Previous is seen left renal pelvic calculus is no longer visualized.  Left renal enhancement   pattern within normal limits.  2.3 x 1.9 cm mixed cystic and solid partially exophytic mid to lower   pole right renal cortical mass suspicious for cystic renal cell carcinoma.  Prominent bilateral   renal pelves.  Multiple nonenlarged retroperitoneal nodes the largest 0.8 cm interposed between the   IVC and aorta (series 3/image 66).  Nonobstructive bowel gas pattern without pericolonic inflammatory changes .  Appendix nonvisualized   likely surgically absent.1      No free fluid, inflammatory changes or organizing collections in the abdomen or pelvis.      Pelvic structures within normal limits for the patient's stated age. No suspicious osseous   abnormalities.     CONCLUSION:   1. 2.3 x 1.9 mixed cystic and solid partially exophytic mid to lower pole right renal cortical mass   suspicious for cystic renal cell carcinoma.  Tissue diagnosis would be definitive.  PET CT as   clinically indicated.  2. Small retroperitoneal nodes the largest 0.8 cm.   3. Evidence of appendectomy.  4. Above findings communicated to Dr. Emily Dwyer on 2/23/2021 at 1345 hours.              Lewis Galo M.D.         Electronically Signed and Approved By: Lewis Galo M.D. on 2/23/2021 at 13:48                     Until signed, this is an unconfirmed preliminary report that may contain  errors and is subject to change.                RAMSR:  D:02/23/21 1336         Assessment  · Renal mass     593.9/N28.89  · Calcium oxalate calculus     275.49/E83.59    Problems Reconciled  Plan  · Medications  o Medications have been Reconciled  o Transition of Care or Provider Policy  · Instructions  o Electronically Identified Patient Education Materials Provided Electronically  · Referrals  o ID: [id number] Date:  12/29/2020 Type: Inbound  Specialty: Urology     CT imaging reviewed and discussed with patient at length, right renal mass (see discussion below), no hydronephrosis, or evidence of residual stone.  Consider patient stone free at this time.     Calcium oxalate stone- Discussed dietary modifications for prevention of stone growth and recurrence including increased hydration, decrease sodium, normal calcium, low animal protein diet.    Informational handouts provided for her review    Right renal mass discussed with patient at length. The patient was counseled regarding diagnostic results, prognosis and risks and benefits of treatment options.   Options discussed including risks, benefits, and alternatives of each with patient including active surveillance versus surgical excision via robotic assisted laparoscopic right partial nephrectomy.    The patient has a small 2.3 x 1.9 cm mid to lower pole exophytic right renal mass with cystic components which enhances and thus is suspicious for a renal cell carcinoma. Patient aware that this mass may be malignant but diagnosis is uncertain without tissue pathology.  Discussed that this lesion is small, 2.3 cm and the risk of it being malignant are increased as size increase.    Patient aware that statistically speaking in untreated patients with an enhancing renal mass of less than or equal to 3 cm, the risk of developing metastasis within 3 years of diagnosis is less than 1%.  She is also aware that active surveillance is a reasonable option supported by AUA guidelines given a patient's individual risk factors and goals.  Risk of surveillance is lowest when renal masses less than 3 cm.  Among patients undergoing surveillance, the main tumor growth rate is 0.3 cm/year.  Aware that tumor growth rate does not reliably distinguish between benign and malignant lesions.  However, metastasis occurs almost exclusively in patients whose primary tumor demonstrates interval  growth.    Treatment of the renal mass may be reasonable in any of the following circumstances  -mass grows larger than 3 cm  -Mass demonstrates ongoing interval growth (especially more than 0.3-0.5 cm/year)  - Patient developed symptoms attributable to the renal mass  - Biopsy shows high-grade cancer    This small mass does appears to be amendable to nephron sparing surgery at this time. Discussed the risks of partial nephrectomy this including bleeding, infection, damage to surrounding structures, pain, injury to ureter and renal pelvis, urine leak, recurrence, need for nephrectomy, need for open surgery, benign pathology, and permanent loss of renal function.  Patient participated in the discussion and notes understanding of these risks.    I discussed the role of biopsy in my opinion and the degree of false negative biopsies and that biopsies are nondiagnostic in up to 20% of cases.      Patient percent participated in the discussion of options for management including surveillance and surgery. Given patient age, desire to treat, and need for prolonged surveillance of this lesion with imaging, patient wishes to proceed with definitive surgical management.      Schedule right robotic assisted laparoscopic partial nephrectomy; dates provided to her, wishes to discuss further with  and will notify office of desired date  All questions addressed at this time      Total time for encounter greater than 30 minutes due to face to face time which dominated greater than 51% of the encounter. including counseling and coordination of care and review of records,                       Electronically Signed by: Emily Sanders MD -Author on February 24, 2021 04:49:14 PM

## 2021-05-15 VITALS — HEIGHT: 67 IN | BODY MASS INDEX: 29.53 KG/M2 | WEIGHT: 188.12 LBS | RESPIRATION RATE: 14 BRPM

## 2021-09-08 ENCOUNTER — OFFICE VISIT (OUTPATIENT)
Dept: OBSTETRICS AND GYNECOLOGY | Facility: CLINIC | Age: 46
End: 2021-09-08

## 2021-09-08 VITALS
HEART RATE: 90 BPM | WEIGHT: 185 LBS | BODY MASS INDEX: 28.98 KG/M2 | SYSTOLIC BLOOD PRESSURE: 114 MMHG | DIASTOLIC BLOOD PRESSURE: 75 MMHG

## 2021-09-08 DIAGNOSIS — Z30.431 SURVEILLANCE OF INTRAUTERINE CONTRACEPTIVE DEVICE: Primary | ICD-10-CM

## 2021-09-08 PROCEDURE — 99212 OFFICE O/P EST SF 10 MIN: CPT | Performed by: OBSTETRICS & GYNECOLOGY

## 2021-09-08 RX ORDER — ERGOCALCIFEROL 1.25 MG/1
CAPSULE ORAL
COMMUNITY
Start: 2021-07-30 | End: 2022-08-09

## 2021-09-08 RX ORDER — FLUTICASONE PROPIONATE 50 MCG
SPRAY, SUSPENSION (ML) NASAL
COMMUNITY
Start: 2021-07-30 | End: 2022-05-14

## 2021-09-08 RX ORDER — ROSUVASTATIN CALCIUM 20 MG/1
20 TABLET, COATED ORAL DAILY
COMMUNITY
Start: 2021-07-30

## 2021-09-08 RX ORDER — SECUKINUMAB 150 MG/ML
INJECTION SUBCUTANEOUS
COMMUNITY
Start: 2021-08-24 | End: 2022-05-25

## 2021-09-08 NOTE — PROGRESS NOTES
Pt states has almost daily bleeding, usually light/spotting, no pain, symptoms seem like they are getting better, no c/o today    PHYSICAL EXAM:  /75   Pulse 90   Wt 83.9 kg (185 lb)   BMI 28.98 kg/m²     General- NAD, alert and oriented, appropriate  Psych- Normal mood, good memory  Abdomen- Soft, non distended, non tender, no masses  External genitalia- Normal, no lesions  Urethra- Normal, no masses, non tender  Vagina- Normal, no atrophy, no discharge, no prolapse  Bladder- Normal, no masses, non tender, no prolapse  Cvx- IUD strings visable 1cm  Uterus- Normal size, shape & consistency.  Non tender, mobile, & no prolapse  Adnexa- No mass, non tender      ASSESSMENT AND PLAN:    Diagnoses and all orders for this visit:    1. Surveillance of intrauterine contraceptive device (Primary)          Counseling:  Pt was counseled to perform monthly string checks. Keep track of menses.    RTO if <q21d, >7d long, or heavy or if positive pregnancy test.    Follow Up:  Return if symptoms worsen or fail to improve.      Marissa Arzola  09/08/2021

## 2021-09-14 ENCOUNTER — TELEPHONE (OUTPATIENT)
Dept: UROLOGY | Facility: CLINIC | Age: 46
End: 2021-09-14

## 2021-09-14 DIAGNOSIS — C64.9 RENAL CELL CARCINOMA, UNSPECIFIED LATERALITY (HCC): Primary | ICD-10-CM

## 2021-09-14 NOTE — TELEPHONE ENCOUNTER
Patient needs to have any orders put in for any studies she is supposed to have before her upcoming visit. She said she went to get them and nothing was put in the computer.    Can you check on what those orders need to be and call patient back or let me know and I will call patient back.      [phone]

## 2021-09-15 ENCOUNTER — PREP FOR SURGERY (OUTPATIENT)
Dept: OTHER | Facility: HOSPITAL | Age: 46
End: 2021-09-15

## 2021-09-15 DIAGNOSIS — C64.9 RENAL CELL CARCINOMA, UNSPECIFIED LATERALITY (HCC): Primary | ICD-10-CM

## 2021-09-15 NOTE — TELEPHONE ENCOUNTER
Called pt, notified that orders are in and scheduling will call her to arrange CT, and that she should do all other tests while shes there for the CT. Pt agreeable.

## 2021-09-27 ENCOUNTER — LAB (OUTPATIENT)
Dept: LAB | Facility: HOSPITAL | Age: 46
End: 2021-09-27

## 2021-09-27 ENCOUNTER — TRANSCRIBE ORDERS (OUTPATIENT)
Dept: LAB | Facility: HOSPITAL | Age: 46
End: 2021-09-27

## 2021-09-27 ENCOUNTER — HOSPITAL ENCOUNTER (OUTPATIENT)
Dept: GENERAL RADIOLOGY | Facility: HOSPITAL | Age: 46
Discharge: HOME | End: 2021-09-27

## 2021-09-27 ENCOUNTER — HOSPITAL ENCOUNTER (OUTPATIENT)
Dept: CT IMAGING | Facility: HOSPITAL | Age: 46
Discharge: HOME | End: 2021-09-27

## 2021-09-27 DIAGNOSIS — Z51.81 MEDICATION MONITORING ENCOUNTER: ICD-10-CM

## 2021-09-27 DIAGNOSIS — L40.0 PSORIASIS VULGARIS: Primary | ICD-10-CM

## 2021-09-27 DIAGNOSIS — C64.9 RENAL CELL CARCINOMA, UNSPECIFIED LATERALITY (HCC): ICD-10-CM

## 2021-09-27 DIAGNOSIS — L40.0 PSORIASIS VULGARIS: ICD-10-CM

## 2021-09-27 PROBLEM — E78.00 HIGH CHOLESTEROL: Status: ACTIVE | Noted: 2021-09-27

## 2021-09-27 PROBLEM — K92.1 HEMATOCHEZIA: Status: ACTIVE | Noted: 2021-09-27

## 2021-09-27 PROBLEM — R51.9 HEADACHE: Status: ACTIVE | Noted: 2021-09-27

## 2021-09-27 PROBLEM — R11.0 NAUSEA: Status: ACTIVE | Noted: 2021-09-27

## 2021-09-27 PROBLEM — N64.4 PAIN OF BREAST: Status: ACTIVE | Noted: 2017-10-11

## 2021-09-27 PROBLEM — E03.9 HYPOTHYROIDISM: Status: ACTIVE | Noted: 2021-09-27

## 2021-09-27 PROBLEM — N63.0 BREAST LUMP: Status: ACTIVE | Noted: 2021-09-27

## 2021-09-27 PROBLEM — M25.579 PAIN IN JOINT, ANKLE AND FOOT: Status: ACTIVE | Noted: 2021-09-27

## 2021-09-27 PROBLEM — L40.9 PSORIASIS: Status: ACTIVE | Noted: 2017-12-20

## 2021-09-27 LAB
ANION GAP SERPL CALCULATED.3IONS-SCNC: 11.7 MMOL/L (ref 5–15)
BILIRUB UR QL STRIP: NEGATIVE
BUN SERPL-MCNC: 8 MG/DL (ref 6–20)
BUN/CREAT SERPL: 9.9 (ref 7–25)
CALCIUM SPEC-SCNC: 9.3 MG/DL (ref 8.6–10.5)
CHLORIDE SERPL-SCNC: 101 MMOL/L (ref 98–107)
CLARITY UR: CLEAR
CO2 SERPL-SCNC: 25.3 MMOL/L (ref 22–29)
COLOR UR: YELLOW
CREAT BLDA-MCNC: 0.7 MG/DL
CREAT SERPL-MCNC: 0.81 MG/DL (ref 0.57–1)
GFR SERPL CREATININE-BSD FRML MDRD: 76 ML/MIN/1.73
GLUCOSE SERPL-MCNC: 93 MG/DL (ref 65–99)
GLUCOSE UR STRIP-MCNC: NEGATIVE MG/DL
HGB UR QL STRIP.AUTO: ABNORMAL
KETONES UR QL STRIP: NEGATIVE
LEUKOCYTE ESTERASE UR QL STRIP.AUTO: NEGATIVE
NITRITE UR QL STRIP: NEGATIVE
PH UR STRIP.AUTO: 6.5 [PH] (ref 5–8)
POTASSIUM SERPL-SCNC: 3.8 MMOL/L (ref 3.5–5.2)
PROT UR QL STRIP: NEGATIVE
SODIUM SERPL-SCNC: 138 MMOL/L (ref 136–145)
SP GR UR STRIP: 1.01 (ref 1–1.03)
UROBILINOGEN UR QL STRIP: ABNORMAL

## 2021-09-27 PROCEDURE — 86480 TB TEST CELL IMMUN MEASURE: CPT

## 2021-09-27 PROCEDURE — 74170 CT ABD WO CNTRST FLWD CNTRST: CPT

## 2021-09-27 PROCEDURE — 71046 X-RAY EXAM CHEST 2 VIEWS: CPT

## 2021-09-27 PROCEDURE — 0 IOPAMIDOL PER 1 ML: Performed by: UROLOGY

## 2021-09-27 PROCEDURE — 80048 BASIC METABOLIC PNL TOTAL CA: CPT

## 2021-09-27 PROCEDURE — 85027 COMPLETE CBC AUTOMATED: CPT

## 2021-09-27 PROCEDURE — 82565 ASSAY OF CREATININE: CPT

## 2021-09-27 PROCEDURE — 36415 COLL VENOUS BLD VENIPUNCTURE: CPT

## 2021-09-27 PROCEDURE — 81003 URINALYSIS AUTO W/O SCOPE: CPT

## 2021-09-27 RX ORDER — LEVOTHYROXINE SODIUM 88 UG/1
TABLET ORAL
COMMUNITY
End: 2021-09-30 | Stop reason: ALTCHOICE

## 2021-09-27 RX ORDER — USTEKINUMAB 45 MG/.5ML
45 INJECTION, SOLUTION SUBCUTANEOUS
COMMUNITY
End: 2022-05-25

## 2021-09-27 RX ADMIN — IOPAMIDOL 100 ML: 755 INJECTION, SOLUTION INTRAVENOUS at 17:53

## 2021-09-28 LAB
DEPRECATED RDW RBC AUTO: 44.5 FL (ref 37–54)
ERYTHROCYTE [DISTWIDTH] IN BLOOD BY AUTOMATED COUNT: 13.1 % (ref 12.3–15.4)
HCT VFR BLD AUTO: 39.9 % (ref 34–46.6)
HGB BLD-MCNC: 13.3 G/DL (ref 12–15.9)
MCH RBC QN AUTO: 31.2 PG (ref 26.6–33)
MCHC RBC AUTO-ENTMCNC: 33.3 G/DL (ref 31.5–35.7)
MCV RBC AUTO: 93.7 FL (ref 79–97)
PLATELET # BLD AUTO: 292 10*3/MM3 (ref 140–450)
PMV BLD AUTO: 11.6 FL (ref 6–12)
RBC # BLD AUTO: 4.26 10*6/MM3 (ref 3.77–5.28)
WBC # BLD AUTO: 8.39 10*3/MM3 (ref 3.4–10.8)

## 2021-09-30 ENCOUNTER — OFFICE VISIT (OUTPATIENT)
Dept: UROLOGY | Facility: CLINIC | Age: 46
End: 2021-09-30

## 2021-09-30 VITALS — WEIGHT: 189.4 LBS | BODY MASS INDEX: 29.73 KG/M2 | HEIGHT: 67 IN | RESPIRATION RATE: 18 BRPM

## 2021-09-30 DIAGNOSIS — N20.0 NEPHROLITHIASIS: ICD-10-CM

## 2021-09-30 DIAGNOSIS — C64.9 RENAL CELL CARCINOMA, UNSPECIFIED LATERALITY (HCC): Primary | ICD-10-CM

## 2021-09-30 LAB
GAMMA INTERFERON BACKGROUND BLD IA-ACNC: 0.1 IU/ML
M TB IFN-G BLD-IMP: NEGATIVE
M TB IFN-G CD4+ BCKGRND COR BLD-ACNC: 0.1 IU/ML
M TB IFN-G CD4+CD8+ BCKGRND COR BLD-ACNC: 0.12 IU/ML
MITOGEN IGNF BLD-ACNC: >10 IU/ML
QUANTIFERON INCUBATION: NORMAL
SERVICE CMNT-IMP: NORMAL

## 2021-09-30 PROCEDURE — 99213 OFFICE O/P EST LOW 20 MIN: CPT | Performed by: UROLOGY

## 2021-09-30 NOTE — PROGRESS NOTES
"    UROLOGY OFFICE FOLLOW-UP NOTE    Subjective   HPI  Geri Bowie is a 46 y.o. female for further evaluation after ER presentation for left-sided back pain.  Diagnosed with distal 3 mm left ureteral stone. Instructed to follow-up with urology.  Patient thought she was \"over it.\"  However her symptoms returned approximately a week and a half later and she has been dealing with significant intermittent pain ever since.  Thinks that stone is still there.  Complains of severe urinary urgency, pressure with mal odor for the past 2 days. Also c/o right sided pain but will change to left-sided pain on occasion.  Describes pain as moderate to severe at times; intermittent; sharp, stabbing; denies significant radiation.  No exacerbating factors.  No associated fever, no nausea.     No prior h/o nephrolithiasis.    Update 2/24/2021: Patient presents for follow-up status post bilateral retrograde, bilateral ureteroscopy, left left laser lithotripsy for renal stone, 1/11/2021.  Patient subsequently underwent stent removal in office with Dr. Montilla and presents with CT scan for further evaluation of right renal lesion prior to today's appointment.   Patient states that she has been doing well since her procedure.  Currently denies any urinary symptoms.  Denies hematuria or flank pain.  No complaints today.    Update 4/1/2021: Patient presents for follow-up after robotic partial nephrectomy last week.  Patient reports some generalized fatigue and flank pain which is improving.  Has issues taking the pain medication as it causes nausea despite Compazine.  Has not taken much of the pain medication due to the concern of nausea.  States that when she gets up she is at times feeling dizzy; does report history of vertigo preop but had not been experiencing it for quite some time.  Denies intolerance to p.o.; denies constipation; believes she is hydrating well.    Update 4/15/2021: Patient presents for postop follow-up robotic " right partial nephrectomy.  Patient states she is doing better.  Abdomen occasionally still sore but improving.  No specific complaints today.    Update 9/30/2021: Patient presents for routine follow-up for surveillance imaging and labs prior. Denies changes since last visit. Does note some back pain primarily on the left; attributes this to her office chair. Wonders if it could be attributable to kidney stones.  ________________  Right partial nephrectomy pathology, 3/23/2021: Clear-cell renal cell carcinoma, pT1a, margin negative    CT abdomen with and without IV contrast, 9/27/2021: Heterogenous low attenuation involving the anterior aspect of the lower pole of the right kidney, expected after partial nephrectomy.  No convincing CT evidence for residual or recurrent malignancy; no definite metastatic disease    Chest x-ray, 9/27/2021: No radiographic findings of acute cardiopulmonary abnormality    UA, 9/27/2021: Negative proteinuria  Creatinine, 9/27/2021: 0.81    CT scan abdomen pelvis with and without contrast, 2/23/2021: 2.3 x 1.9 mixed cystic and solid partially exophytic mid to lower pole right renal cortical mass suspicious for cystic renal cell carcinoma.    Stone analysis, 1/11/2021: 100% calcium oxalate    CT abdomen pelvis, without contrast, 12/5/2020:  3 mm calculus in the distal right ureter without significant hydronephrosis; Left nephrolithiasis. 1.8 cm partially exophytic indeterminate mass anteriorly in the lower pole of the right kidney.  This is indeterminate without intravenous contrast.  Recommend an MRI of the abdomen with and without intravenous gadolinium for further evaluation.    Labs, 12/5/2020:  WBC 9.99  Creatinine: 0.71  UA TNTC RBCs, negative nitrite, negative leukocyte esterase (no culture sent).          Results for orders placed or performed in visit on 09/27/21   QuantiFERON TB Gold    Specimen: Blood   Result Value Ref Range    QuantiFERON Incubation Incubation performed.      QuantiFERON Criteria Comment     QUANTIFERON TB1 AG VALUE 0.10 IU/mL    QUANTIFERON TB2 AG VALUE 0.12 IU/mL    QuantiFERON Nil Value 0.10 IU/mL    QuantiFERON Mitogen Value >10.00 IU/mL    QUANTIFERON-TB GOLD PLUS Negative Negative         Medical History:  Past Medical History:   Diagnosis Date   • Abnormal Pap smear of cervix    • Cervical dysplasia    • Migraine         Social History:  Social History     Socioeconomic History   • Marital status:      Spouse name: Not on file   • Number of children: Not on file   • Years of education: Not on file   • Highest education level: Not on file   Tobacco Use   • Smoking status: Current Every Day Smoker     Packs/day: 0.50     Years: 30.00     Pack years: 15.00     Types: Cigarettes     Start date: 9/8/1991   • Smokeless tobacco: Never Used   Substance and Sexual Activity   • Alcohol use: Not Currently   • Drug use: Never   • Sexual activity: Yes     Partners: Male     Birth control/protection: I.U.D.        Family History:  History reviewed. No pertinent family history.     Surgical History:  Past Surgical History:   Procedure Laterality Date   • ANKLE OPEN REDUCTION INTERNAL FIXATION Right 2016   • NEPHRECTOMY PARTIAL Right 03/2021    Kidney Cancer        Allergies:  Allergies   Allergen Reactions   • Penicillins Hives        Current Medications:  Current Outpatient Medications   Medication Sig Dispense Refill   • cholecalciferol (VITAMIN D3) 1.25 MG (16092 UT) capsule cholecalciferol (vitamin D3) 1,250 mcg (50,000 unit) capsule   Take 1 capsule every day by oral route.     • Cosentyx Sensoready, 300 MG, 150 MG/ML solution auto-injector      • cyanocobalamin (VITAMIN B-12) 1000 MCG tablet Vitamin B-12  1,000 mcg tablet   Take 1 tablet by oral route.   taken from clinic stock     • fluticasone (FLONASE) 50 MCG/ACT nasal spray      • rosuvastatin (CRESTOR) 20 MG tablet      • ustekinumab (Stelara) 45 MG/0.5ML injection 45 mg.     • vitamin D (ERGOCALCIFEROL) 1.25  "MG (77127 UT) capsule capsule        No current facility-administered medications for this visit.       Review of systems  Constitutional: Denies fever chills  GI: Denies nausea, vomiting    Objective     Vital Signs:   Resp 18   Ht 168.9 cm (66.5\")   Wt 85.9 kg (189 lb 6.4 oz)   BMI 30.11 kg/m²       Physical exam  No acute distress, well-nourished  Awake alert and oriented  Mood normal; affect normal    Results  PROCEDURE:  CT ABDOMEN W WO CONTRAST     COMPARISONS:           [facility], CT, ABDOMEN-PELVIS W, 10/19/2007, 19:41.                 [facility], CT, ABD PEL W/O CONTRAST, 12/05/2020, 16:42.  [facility],   CT, ABD-PELVIS W/WO CONTRAST, 2/23/2021, 12:26.     INDICATIONS:  History of right-sided renal cell carcinoma; the patient is status post partial right   nephrectomy (3/2021); imaging f/u.     TECHNIQUE:    After obtaining the patient's consent, 998 CT images were created without and with   non-ionic intravenous contrast material.  Oral contrast agent was also administered for the study.    CT images were obtained from above the level the diaphragm to the upper pelvis.     PROTOCOL:     Urology CT imaging protocol performed of the abdomen only.                 RADIATION:      DLP: 792mGy*cm               Automated exposure control was utilized to minimize radiation dose.   CONTRAST:      93cc Isovue 370 I.V.  LABS:   eGFR: >60ml/min/1.73m2     FINDINGS:        Since the prior abdominal/pelvic CT study from 2/23/2021, the patient has undergone   partial right nephrectomy of the anterior lower pole somewhat exophytic renal mass.  There is   heterogeneous low attenuation involving the anterior aspect of the lower pole of the right kidney,   as seen on image 50 of series 5 (and adjacent images), which is expected after such surgery.  This   finding measures about 3.1 x 1.8 x 3.4 cm in transverse, AP (anteroposterior), and craniocaudal   extent, respectively.  It is " consistent with an operative defect and cortical scarring with minimal   anterior perinephric fluid.  There may be minimal stranding in the perinephric fat on the right,   especially seen anteriorly.  There is no convincing CT evidence for local recurrence.  Consider   continued imaging follow-up.  No suspicious renal mass is seen bilaterally.  Surgical clips are   identified, especially inferior to the level of the right kidney.  These findings are thought to be   related to prior appendectomy and were seen preoperatively on the 2/23/2021 exam.  No enlarged   retroperitoneal or mesenteric lymph nodes are seen.  No adrenal mass.  There is no definite CT   evidence of tumor vein thrombosis involving the left renal vein or the inferior vena cava.  There   are tiny nonobstructing upper pole left renal calyceal stones, measuring about 2 mm in greatest   diameter, such as seen on image 39 of series 2; similar findings were seen previously on the   2/23/2021 exam.  No hydronephrosis.  No acute pyelonephritis.  No ureterolithiasis is seen within   the partially imaged ureters.  There are thought to be stable tiny hepatic low densities, which   predominantly represent benign hepatic cysts, measuring about 1.3 cm in greatest axial diameter.    There may be a tiny benign intrahepatic hemangioma, measuring about 1.4 cm, involving the posterior   aspect of the superior right lobe of the liver, as seen on image 27 of series 5.  This finding is   not significantly changed since the 10/19/2007 CT exam.  No gallstones.  No acute cholecystitis.    No acute pancreatitis.  No mechanical bowel obstruction is seen.  No acute diverticulitis or   colitis.  No pneumoperitoneum or pneumatosis.  No pneumo-retroperitoneum.  Sclerotic changes   involve the partially imaged bilateral sacroiliac joints.  There are degenerative changes of the   imaged spine, especially manifested as facet osteoarthritis involving the lower lumbar spine.  No    aggressive osseous lesion is suggested.  No acute fracture is seen.  Minimal atherosclerotic change   involves the distal abdominal aorta without aneurysmal dilatation.  No suspicious nodules are seen   in the partially imaged lung bases.  No acute infiltrate is suggested.  No pleural effusion.     CONCLUSION: The patient has undergone partial right nephrectomy.  There is no convincing CT   evidence for residual or recurrent malignancy.  No definite metastatic disease is appreciated   within the abdomen.  Consider continued imaging follow-up.  Please see above comments for further   detail.             CHARLENE SAUCEDO JR, MD         Electronically Signed and Approved By: CHARLENE SAUCEDO JR, MD on 9/28/2021 at 6:46             ___________  PROCEDURE:  XR CHEST 2 VW     COMPARISON: [facility], CT, CT ABDOMEN W WO CONTRAST, 9/27/2021, 17:46.  [facility], CR, CXR PORTABLE, 3/24/2021, 6:27.  [facility], CR, CHEST PA/AP   & LAT 2V, 8/15/2016, 15:58.     INDICATIONS:  renal cell carcinoma, post partial nephrectomy     FINDINGS:          No focal or diffuse pulmonary infiltrate is seen.  There appear to be scattered calcified   granulomas, similar to the exam from 2016. No pleural effusion or pneumothorax is identified.    There are degenerative in the thoracic spine.  Heart size and mediastinal contour appear within   normal limits and unchanged.     CONCLUSION:   No radiographic findings of acute cardiopulmonary abnormality.            ULISES ESQUEDA MD         Electronically Signed and Approved By: ULISES ESQUEDA MD on 9/27/2021 at 18:53       Problem List:  Patient Active Problem List   Diagnosis   • Aftercare for healing traumatic fracture of other bone   • Breast lump   • Closed displaced oblique fracture of shaft of right tibia   • Closed fracture of distal end of right fibula with routine healing   • Headache   • Hematochezia   • High cholesterol   • Hypothyroidism   •  Nausea   • Pain in joint, ankle and foot   • Pain of breast   • Psoriasis       Assessment/Plan   Diagnoses and all orders for this visit:    1. Renal cell carcinoma, unspecified laterality (HCC) (Primary)      Left nephrolithiasis-imaging reviewed with patient; reviewed previous CT scan from February 2021, small left intrarenal calculi appear of a size that she would likely spontaneously passed. Discussed that intrarenal nonobstructing stones typically do not cause symptoms.  Believe patient may benefit from more supportive office chair.    Right cell renal cell carcinoma, pT1a, margin negative.    Reviewed CT scan, chest x-ray, and labs. Doing well. CT scan findings consistent with expected postoperative changes after partial nephrectomy. Warrants continued monitoring. No proteinuria; creatinine normal.    Plan for surveillance studies again in 6 months per AUA and NCCN guidelines; if stable will go to annually    Follow-up 6 mo studies prior or earlier as needed  All question addressed          Signed:  Emily Sam MD  09/30/21  14:04 EDT    Total time for encounter was 22 minutes including same day preparation prior to encounter (e.g. reviewing labs, prior notes), face to face time, counseling and coordination of care and ordering medications, test, or procedures.

## 2021-10-05 ENCOUNTER — TRANSCRIBE ORDERS (OUTPATIENT)
Dept: ADMINISTRATIVE | Facility: HOSPITAL | Age: 46
End: 2021-10-05

## 2021-10-05 DIAGNOSIS — Z12.31 SCREENING MAMMOGRAM, ENCOUNTER FOR: Primary | ICD-10-CM

## 2021-10-11 ENCOUNTER — OFFICE VISIT (OUTPATIENT)
Dept: OBSTETRICS AND GYNECOLOGY | Facility: CLINIC | Age: 46
End: 2021-10-11

## 2021-10-11 VITALS
DIASTOLIC BLOOD PRESSURE: 77 MMHG | HEART RATE: 82 BPM | HEIGHT: 66 IN | BODY MASS INDEX: 30.53 KG/M2 | WEIGHT: 190 LBS | SYSTOLIC BLOOD PRESSURE: 117 MMHG

## 2021-10-11 DIAGNOSIS — N64.4 BREAST PAIN, LEFT: Primary | ICD-10-CM

## 2021-10-11 PROCEDURE — 99213 OFFICE O/P EST LOW 20 MIN: CPT | Performed by: OBSTETRICS & GYNECOLOGY

## 2021-10-11 NOTE — PROGRESS NOTES
"GYN Problem/Follow Up Visit    Chief Complaint   Patient presents with   • Breast Pain     LEFT BREAST PAIN           HPI  Geri Bowie is a 46 y.o. female, , who presents for outer lt breast pain radiating towards nipple x one week. States pain is intermittent. Feels like a burning sensation. Denies nipple d/c or skin changes.    Additional OB/GYN History   No LMP recorded.  Current contraception: contraceptive methods: IUD.  Insertion date:   Desires to: continue contraception  Allergies : Penicillins     The additional following portions of the patient's history were reviewed and updated as appropriate: allergies, current medications, past family history, past medical history, past social history, past surgical history and problem list.    Review of Systems    I have reviewed and agree with the HPI, ROS, and historical information as entered above. Marissa Arzola, APRN    Objective   /77   Pulse 82   Ht 167.6 cm (66\")   Wt 86.2 kg (190 lb)   BMI 30.67 kg/m²     Physical Exam  Vitals reviewed.   Chest:   Breasts:      Right: Normal.      Left: Tenderness (outer) present. No swelling, bleeding, inverted nipple, mass, nipple discharge, skin change or axillary adenopathy.       Lymphadenopathy:      Upper Body:      Left upper body: No axillary adenopathy.   Skin:     General: Skin is warm and dry.   Neurological:      Mental Status: She is alert and oriented to person, place, and time.            Assessment and Plan    Diagnoses and all orders for this visit:    1. Breast pain, left (Primary)  -     Mammo Diagnostic Left With CAD; Future    decrease caffeine. Wear a comfortable supportive bra. Tylenol/ibuprofen as needed. Vitamin E daily x 2 weeks. Discussed that breast pain can occur with contraception-had mirena inserted in august. Pt desires to keep mirena.    Counseling:  She understands the importance of having the above orders performed in a timely fashion.  She is encouraged to review " her results online and/or contact or office if she has questions.     Follow Up:  Return for prn after imaging.      Marissa Arzola, APRN  10/11/2021

## 2021-10-21 ENCOUNTER — HOSPITAL ENCOUNTER (OUTPATIENT)
Dept: MAMMOGRAPHY | Facility: HOSPITAL | Age: 46
Discharge: HOME | End: 2021-10-21
Admitting: OBSTETRICS & GYNECOLOGY

## 2021-10-21 DIAGNOSIS — N64.4 BREAST PAIN, LEFT: ICD-10-CM

## 2021-10-21 PROCEDURE — 77065 DX MAMMO INCL CAD UNI: CPT

## 2021-10-21 PROCEDURE — G0279 TOMOSYNTHESIS, MAMMO: HCPCS

## 2022-01-27 ENCOUNTER — TRANSCRIBE ORDERS (OUTPATIENT)
Dept: ADMINISTRATIVE | Facility: HOSPITAL | Age: 47
End: 2022-01-27

## 2022-01-27 DIAGNOSIS — E55.9 VITAMIN D DEFICIENCY DISEASE: Primary | ICD-10-CM

## 2022-02-03 ENCOUNTER — APPOINTMENT (OUTPATIENT)
Dept: NUTRITION | Facility: HOSPITAL | Age: 47
End: 2022-02-03

## 2022-02-15 ENCOUNTER — EDUCATION (OUTPATIENT)
Dept: NUTRITION | Facility: HOSPITAL | Age: 47
End: 2022-02-15

## 2022-02-15 ENCOUNTER — HOSPITAL ENCOUNTER (OUTPATIENT)
Dept: NUTRITION | Facility: HOSPITAL | Age: 47
Discharge: HOME | End: 2022-02-15
Admitting: DIETITIAN, REGISTERED

## 2022-02-15 ENCOUNTER — TRANSCRIBE ORDERS (OUTPATIENT)
Dept: NUTRITION | Facility: HOSPITAL | Age: 47
End: 2022-02-15

## 2022-02-15 DIAGNOSIS — E55.9 VITAMIN D DEFICIENCY: Primary | ICD-10-CM

## 2022-02-15 PROCEDURE — 97802 MEDICAL NUTRITION INDIV IN: CPT | Performed by: DIETITIAN, REGISTERED

## 2022-02-21 VITALS — BODY MASS INDEX: 31.04 KG/M2 | WEIGHT: 193.12 LBS | HEIGHT: 66 IN

## 2022-02-21 NOTE — CONSULTS
"Geri Bowie is a 46 y.o. female who presents to [facility] for nutrition consult r/t diagnosis of vitamin D deficiency.  Pt also states she has thyroid issue and desires to lose wt.  Geri Bowie is referred by Dr. Tapia.     Past Medical History:   Diagnosis Date   • Abnormal Pap smear of cervix    • Cervical dysplasia    • Migraine        Anthropometrics  Ht Readings from Last 1 Encounters:   02/15/22 167.6 cm (66\")     Wt Readings from Last 1 Encounters:   02/15/22 87.6 kg (193 lb 2 oz)     Body mass index is 31.17 kg/m².    Nutrition Information  Nutrition Information  Enter everything you can remember eating in the last 24 hours (1 day): pt states she is attempting intermittent fasting from time to time; recent intake- dinner- bbq chicken wings; snacks- rare but may include cheese and crackers, cookies or chocolate in the evening; beverages- regular soda, water    Medications  Current Outpatient Medications   Medication Sig Dispense Refill   • cholecalciferol (VITAMIN D3) 1.25 MG (42128 UT) capsule cholecalciferol (vitamin D3) 1,250 mcg (50,000 unit) capsule   Take 1 capsule every day by oral route.     • Cosentyx Sensoready, 300 MG, 150 MG/ML solution auto-injector      • cyanocobalamin (VITAMIN B-12) 1000 MCG tablet Vitamin B-12  1,000 mcg tablet   Take 1 tablet by oral route.   taken from clinic stock     • fluticasone (FLONASE) 50 MCG/ACT nasal spray      • rosuvastatin (CRESTOR) 20 MG tablet      • ustekinumab (Stelara) 45 MG/0.5ML injection 45 mg.     • vitamin D (ERGOCALCIFEROL) 1.25 MG (70473 UT) capsule capsule        No current facility-administered medications for this visit.       Labs   No results found for: HGBA1C    Nutrition counseling provided on calorie counting, portion control, measuring and reading labels. Discussed eating out and gave suggestions on controlling calorie intake and making healthier food choices.     Discussed intermittent fasting, " discussed meal timing and adequate nutritional intake.  Pt not interested in tracking intake.  Discussed importance of balanced nutrition and adequate intake of energy.    Daily exercise encouraged (as recommended by healthcare provider). Discussed the benefits of exercise in lowering blood glucose, blood pressure, cholesterol, stress and controlling body weight.     Dietitian contact number provided.  Patient encouraged to call with questions or concerns.     Time spent with patient: 30 minutes    Kay Mcduffie RDN, LD  02/15/2022

## 2022-03-24 ENCOUNTER — APPOINTMENT (OUTPATIENT)
Dept: CT IMAGING | Facility: HOSPITAL | Age: 47
End: 2022-03-24

## 2022-03-30 ENCOUNTER — HOSPITAL ENCOUNTER (OUTPATIENT)
Dept: CT IMAGING | Facility: HOSPITAL | Age: 47
Discharge: HOME | End: 2022-03-30
Admitting: UROLOGY

## 2022-03-30 DIAGNOSIS — C64.9 RENAL CELL CARCINOMA, UNSPECIFIED LATERALITY: ICD-10-CM

## 2022-03-30 LAB
CREAT BLDA-MCNC: 0.8 MG/DL
EGFRCR SERPLBLD CKD-EPI 2021: 91.6 ML/MIN/1.73

## 2022-03-30 PROCEDURE — 0 IOPAMIDOL PER 1 ML: Performed by: UROLOGY

## 2022-03-30 PROCEDURE — 74170 CT ABD WO CNTRST FLWD CNTRST: CPT

## 2022-03-30 PROCEDURE — 82565 ASSAY OF CREATININE: CPT

## 2022-03-30 RX ADMIN — IOPAMIDOL 100 ML: 755 INJECTION, SOLUTION INTRAVENOUS at 18:20

## 2022-04-04 ENCOUNTER — HOSPITAL ENCOUNTER (OUTPATIENT)
Dept: GENERAL RADIOLOGY | Facility: HOSPITAL | Age: 47
Discharge: HOME | End: 2022-04-04

## 2022-04-04 ENCOUNTER — LAB (OUTPATIENT)
Dept: LAB | Facility: HOSPITAL | Age: 47
End: 2022-04-04

## 2022-04-04 DIAGNOSIS — C64.9 RENAL CELL CARCINOMA, UNSPECIFIED LATERALITY: ICD-10-CM

## 2022-04-04 LAB
ANION GAP SERPL CALCULATED.3IONS-SCNC: 12.1 MMOL/L (ref 5–15)
BILIRUB UR QL STRIP: NEGATIVE
BUN SERPL-MCNC: 7 MG/DL (ref 6–20)
BUN/CREAT SERPL: 9.5 (ref 7–25)
CALCIUM SPEC-SCNC: 9.4 MG/DL (ref 8.6–10.5)
CHLORIDE SERPL-SCNC: 99 MMOL/L (ref 98–107)
CLARITY UR: CLEAR
CO2 SERPL-SCNC: 26.9 MMOL/L (ref 22–29)
COLOR UR: YELLOW
CREAT SERPL-MCNC: 0.74 MG/DL (ref 0.57–1)
EGFRCR SERPLBLD CKD-EPI 2021: 100.6 ML/MIN/1.73
GLUCOSE SERPL-MCNC: 95 MG/DL (ref 65–99)
GLUCOSE UR STRIP-MCNC: NEGATIVE MG/DL
HGB UR QL STRIP.AUTO: ABNORMAL
KETONES UR QL STRIP: NEGATIVE
LEUKOCYTE ESTERASE UR QL STRIP.AUTO: ABNORMAL
NITRITE UR QL STRIP: NEGATIVE
PH UR STRIP.AUTO: 6.5 [PH] (ref 5–8)
POTASSIUM SERPL-SCNC: 3.8 MMOL/L (ref 3.5–5.2)
PROT UR QL STRIP: ABNORMAL
SODIUM SERPL-SCNC: 138 MMOL/L (ref 136–145)
SP GR UR STRIP: 1.01 (ref 1–1.03)
UROBILINOGEN UR QL STRIP: ABNORMAL

## 2022-04-04 PROCEDURE — 81003 URINALYSIS AUTO W/O SCOPE: CPT

## 2022-04-04 PROCEDURE — 36415 COLL VENOUS BLD VENIPUNCTURE: CPT

## 2022-04-04 PROCEDURE — 80048 BASIC METABOLIC PNL TOTAL CA: CPT

## 2022-04-04 PROCEDURE — 71046 X-RAY EXAM CHEST 2 VIEWS: CPT

## 2022-04-07 ENCOUNTER — OFFICE VISIT (OUTPATIENT)
Dept: UROLOGY | Facility: CLINIC | Age: 47
End: 2022-04-07

## 2022-04-07 VITALS — RESPIRATION RATE: 16 BRPM | WEIGHT: 198.2 LBS | BODY MASS INDEX: 31.85 KG/M2 | HEIGHT: 66 IN

## 2022-04-07 DIAGNOSIS — C64.9 RENAL CELL CARCINOMA, UNSPECIFIED LATERALITY: Primary | ICD-10-CM

## 2022-04-07 DIAGNOSIS — N20.0 NEPHROLITHIASIS: ICD-10-CM

## 2022-04-07 PROBLEM — G47.33 OBSTRUCTIVE SLEEP APNEA SYNDROME: Status: ACTIVE | Noted: 2022-04-07

## 2022-04-07 PROBLEM — J30.2 SEASONAL ALLERGIC RHINITIS: Status: ACTIVE | Noted: 2022-04-07

## 2022-04-07 PROBLEM — N93.0 POSTCOITAL BLEEDING: Status: ACTIVE | Noted: 2022-04-07

## 2022-04-07 PROBLEM — E55.9 VITAMIN D DEFICIENCY: Status: ACTIVE | Noted: 2022-04-07

## 2022-04-07 PROBLEM — M12.811 ROTATOR CUFF ARTHROPATHY OF RIGHT SHOULDER: Status: ACTIVE | Noted: 2022-04-07

## 2022-04-07 PROBLEM — E53.8 VITAMIN B12 DEFICIENCY: Status: ACTIVE | Noted: 2022-04-07

## 2022-04-07 PROBLEM — N94.6 DYSMENORRHEA: Status: ACTIVE | Noted: 2022-04-07

## 2022-04-07 PROBLEM — E66.9 OBESITY: Status: ACTIVE | Noted: 2022-04-07

## 2022-04-07 PROBLEM — R73.09 ABNORMAL GLUCOSE LEVEL: Status: ACTIVE | Noted: 2022-04-07

## 2022-04-07 PROBLEM — M19.079 PRIMARY LOCALIZED OSTEOARTHROSIS OF ANKLE AND FOOT: Status: ACTIVE | Noted: 2022-04-07

## 2022-04-07 PROCEDURE — 99213 OFFICE O/P EST LOW 20 MIN: CPT | Performed by: UROLOGY

## 2022-04-07 NOTE — PROGRESS NOTES
"    UROLOGY OFFICE FOLLOW-UP NOTE    Subjective   HPI  Geri Bowie is a 47 y.o. female for further evaluation after ER presentation for left-sided back pain.  Diagnosed with distal 3 mm left ureteral stone. Instructed to follow-up with urology.  Patient thought she was \"over it.\"  However her symptoms returned approximately a week and a half later and she has been dealing with significant intermittent pain ever since.  Thinks that stone is still there.  Complains of severe urinary urgency, pressure with mal odor for the past 2 days. Also c/o right sided pain but will change to left-sided pain on occasion.  Describes pain as moderate to severe at times; intermittent; sharp, stabbing; denies significant radiation.  No exacerbating factors.  No associated fever, no nausea.     No prior h/o nephrolithiasis.    Update 2/24/2021: Patient presents for follow-up status post bilateral retrograde, bilateral ureteroscopy, left left laser lithotripsy for renal stone, 1/11/2021.  Patient subsequently underwent stent removal in office with Dr. Montilla and presents with CT scan for further evaluation of right renal lesion prior to today's appointment.   Patient states that she has been doing well since her procedure.  Currently denies any urinary symptoms.  Denies hematuria or flank pain.  No complaints today.    Update 4/1/2021: Patient presents for follow-up after robotic partial nephrectomy last week.  Patient reports some generalized fatigue and flank pain which is improving.  Has issues taking the pain medication as it causes nausea despite Compazine.  Has not taken much of the pain medication due to the concern of nausea.  States that when she gets up she is at times feeling dizzy; does report history of vertigo preop but had not been experiencing it for quite some time.  Denies intolerance to p.o.; denies constipation; believes she is hydrating well.    Update 4/15/2021: Patient presents for postop follow-up robotic " right partial nephrectomy.  Patient states she is doing better.  Abdomen occasionally still sore but improving.  No specific complaints today.    Update 9/30/2021: Patient presents for routine follow-up for surveillance imaging and labs prior. Denies changes since last visit. Does note some back pain primarily on the left; attributes this to her office chair. Wonders if it could be attributable to kidney stones.    Update 4/7/2022: Patient presents due to history of renal cell carcinoma; presents with surveillance imaging and labs prior to today's visit.  Reports some persistent right back pain, trying to quit smoking.  ________________  Right partial nephrectomy pathology, 3/23/2021: Clear-cell renal cell carcinoma, pT1a, margin negative    CT abdomen with and without contrast, 4/4/2022: No evidence for recurrent or residual disease no evidence of metastatic disease in the abdomen  Chest x-ray, 4/4/2022: No acute cardiopulmonary process  UA, 4/4/2022: Protein 30 mg/dL; small leukocyte esterase; nitrite negative; large blood 3+ (was on menses); Creatinine 0.74    CT abdomen with and without IV contrast, 9/27/2021: Heterogenous low attenuation involving the anterior aspect of the lower pole of the right kidney, expected after partial nephrectomy.  No convincing CT evidence for residual or recurrent malignancy; no definite metastatic disease    Chest x-ray, 9/27/2021: No radiographic findings of acute cardiopulmonary abnormality    UA, 9/27/2021: Negative proteinuria  Creatinine, 9/27/2021: 0.81    CT scan abdomen pelvis with and without contrast, 2/23/2021: 2.3 x 1.9 mixed cystic and solid partially exophytic mid to lower pole right renal cortical mass suspicious for cystic renal cell carcinoma.    Stone analysis, 1/11/2021: 100% calcium oxalate    CT abdomen pelvis, without contrast, 12/5/2020:  3 mm calculus in the distal right ureter without significant hydronephrosis; Left nephrolithiasis. 1.8 cm partially  exophytic indeterminate mass anteriorly in the lower pole of the right kidney.  This is indeterminate without intravenous contrast.  Recommend an MRI of the abdomen with and without intravenous gadolinium for further evaluation.    Labs, 12/5/2020:  WBC 9.99  Creatinine: 0.71  UA TNTC RBCs, negative nitrite, negative leukocyte esterase (no culture sent).          Results for orders placed or performed in visit on 04/04/22   Basic Metabolic Panel    Specimen: Blood   Result Value Ref Range    Glucose 95 65 - 99 mg/dL    BUN 7 6 - 20 mg/dL    Creatinine 0.74 0.57 - 1.00 mg/dL    Sodium 138 136 - 145 mmol/L    Potassium 3.8 3.5 - 5.2 mmol/L    Chloride 99 98 - 107 mmol/L    CO2 26.9 22.0 - 29.0 mmol/L    Calcium 9.4 8.6 - 10.5 mg/dL    BUN/Creatinine Ratio 9.5 7.0 - 25.0    Anion Gap 12.1 5.0 - 15.0 mmol/L    eGFR 100.6 >60.0 mL/min/1.73   Urinalysis without microscopic (no culture) - Urine, Clean Catch    Specimen: Urine, Clean Catch   Result Value Ref Range    Color, UA Yellow Yellow, Straw    Appearance, UA Clear Clear    pH, UA 6.5 5.0 - 8.0    Specific Gravity, UA 1.015 1.005 - 1.030    Glucose, UA Negative Negative    Ketones, UA Negative Negative    Bilirubin, UA Negative Negative    Blood, UA Large (3+) (A) Negative    Protein, UA 30 mg/dL (1+) (A) Negative    Leuk Esterase, UA Small (1+) (A) Negative    Nitrite, UA Negative Negative    Urobilinogen, UA 0.2 E.U./dL 0.2 - 1.0 E.U./dL         Medical History:  Past Medical History:   Diagnosis Date   • Abnormal Pap smear of cervix    • Cervical dysplasia    • Migraine         Social History:  Social History     Socioeconomic History   • Marital status:    Tobacco Use   • Smoking status: Current Every Day Smoker     Packs/day: 0.50     Years: 30.00     Pack years: 15.00     Types: Cigarettes     Start date: 9/8/1991   • Smokeless tobacco: Never Used   Substance and Sexual Activity   • Alcohol use: Not Currently   • Drug use: Never   • Sexual activity: Yes  "    Partners: Male     Birth control/protection: I.U.D.        Family History:  History reviewed. No pertinent family history.     Surgical History:  Past Surgical History:   Procedure Laterality Date   • ANKLE OPEN REDUCTION INTERNAL FIXATION Right 2016   • NEPHRECTOMY PARTIAL Right 03/2021    Kidney Cancer        Allergies:  Allergies   Allergen Reactions   • Amoxicillin Anaphylaxis   • Penicillins Hives        Current Medications:  Current Outpatient Medications   Medication Sig Dispense Refill   • cholecalciferol (VITAMIN D3) 1.25 MG (61361 UT) capsule cholecalciferol (vitamin D3) 1,250 mcg (50,000 unit) capsule   Take 1 capsule every day by oral route.     • Cosentyx Sensoready, 300 MG, 150 MG/ML solution auto-injector      • cyanocobalamin (VITAMIN B-12) 1000 MCG tablet Vitamin B-12  1,000 mcg tablet   Take 1 tablet by oral route.   taken from clinic stock     • Cyanocobalamin 1000 MCG/15ML liquid Daily.     • fluticasone (FLONASE) 50 MCG/ACT nasal spray      • rosuvastatin (CRESTOR) 20 MG tablet      • ustekinumab (Stelara) 45 MG/0.5ML injection 45 mg.     • vitamin D (ERGOCALCIFEROL) 1.25 MG (02577 UT) capsule capsule        No current facility-administered medications for this visit.       Review of systems  Constitutional: Denies fever chills  GI: Denies nausea, vomiting    Objective     Vital Signs:   Resp 16   Ht 167.6 cm (65.98\")   Wt 89.9 kg (198 lb 3.2 oz)   BMI 32.01 kg/m²       Physical exam  No acute distress, well-nourished  Awake alert and oriented  Mood normal; affect normal    Results  PROCEDURE:  CT ABDOMEN W WO CONTRAST     COMPARISON: [facility], CT, CT ABDOMEN W WO CONTRAST, 9/27/2021, 17:46.     INDICATIONS:  Renal cell carcinoma; s/p right partial nephrectomy     TECHNIQUE:    After obtaining the patient's consent, CT images were created without and with non-ionic   intravenous contrast material.       PROTOCOL:     Standard imaging protocol performed               "   RADIATION:      DLP: 685 mGy*cm               Automated exposure control was utilized to minimize radiation dose.   CONTRAST:      100 cc Isovue 370 I.V.  LABS:   eGFR: >60 ml/min/1.73m2     FINDINGS:          Abdomen without contrast:  Included lung bases are clear.     No radiodense gallstones.  Tiny radiodense calculus in the upper left kidney.     Abdomen with contrast:  Kidneys enhance symmetrically.  Partial nephrectomy change in the lower   right kidney.  No evidence for residual recurrent disease.  Symmetric excretion of contrast.  No   abnormal filling defect.     Hepatic cysts are similar.  Spleen, adrenal glands, pancreas, gallbladder unremarkable.  Bowel   loops are nondilated.  No retroperitoneal adenopathy.  No aggressive appearing bone change.     IMPRESSION:               Expected appearance of partial nephrectomy change in the lower right kidney.  No   evidence for recurrent or residual disease.  No evidence for metastatic disease in the abdomen.            SHILPI VIERA MD         Electronically Signed and Approved By: SHILPI VIERA MD on 3/30/2022 at 18:49     _________  PROCEDURE:  XR CHEST 2 VW     COMPARISON: [facility], , XR CHEST 2 VW, 9/27/2021, 17:31.     INDICATIONS:  RENAL CELL CARCINOMA; PT STATED NO CHEST COMPLAINTS TODAY     FINDINGS:          The cardiomediastinal silhouette is within normal limits. The lungs are clear. There is no focal   consolidation, pneumothorax or large pleural effusion.        IMPRESSION:               No acute cardiopulmonary process.            DANIELA FAIRCHILD MD         Electronically Signed and Approved By: DANIELA FAIRCHILD MD on 4/05/2022 at 0:57              Problem List:  Patient Active Problem List   Diagnosis   • Aftercare for healing traumatic fracture of other bone   • Breast lump   • Closed displaced oblique fracture of shaft of right tibia   • Closed fracture of distal end of right fibula with routine healing   • Headache   • Hematochezia    • High cholesterol   • Hypothyroidism   • Nausea   • Pain in joint, ankle and foot   • Pain of breast   • Psoriasis   • Abnormal glucose level   • Dysmenorrhea   • Malignant tumor of kidney (HCC)   • Obesity   • Obstructive sleep apnea syndrome   • Postcoital bleeding   • Primary localized osteoarthrosis of ankle and foot   • Rotator cuff arthropathy of right shoulder   • Seasonal allergic rhinitis   • Vitamin B12 deficiency   • Vitamin D deficiency       Assessment/Plan   Diagnoses and all orders for this visit:    1. Renal cell carcinoma, unspecified laterality (HCC) (Primary)  -     CT Abdomen Pelvis With & Without Contrast; Future  -     Basic Metabolic Panel; Future  -     CBC (No Diff); Future  -     XR Chest 2 View; Future  -     Urinalysis With Microscopic - Urine, Clean Catch; Future    2. Nephrolithiasis  -     CT Abdomen Pelvis With & Without Contrast; Future    CT scan imaging and labs personally reviewed and discussed with patient    UA was some proteinuria and hematuria noted; likely associated with hydration status, menses; anticipate seeing PCP in about 3 months, recommend repeat urinalysis at that point.  Creatinine remained stable and within normal range.  Chest x-ray without abnormality    Left nephrolithiasis-imaging reviewed with patient; tiny left intrarenal calculi appear of a size that she would likely spontaneously pass. Discussed that intrarenal nonobstructing stones typically do not cause symptoms.  No right nephrolithiasis appreciated; uncertain etiology for her chronic right-sided pain; believed not to be of urologic origin.  Encouraged her to follow-up with PCP for further work-up and management depending on symptoms    Right cell renal cell carcinoma, pT1a, margin negative.    Reviewed CT scan, continues to do well; CT scan findings consistent with expected postoperative changes after partial nephrectomy; no evidence of recurrent or residual disease    Plan for surveillance studies  again in 1 year per AUA and NCCN guidelines; plan for annually at least 3 years possibly up to 5    Follow-up 1 year studies prior or earlier as needed  All question addressed      Signed:  Emily Sam MD  04/08/22  20:40 EDT    Total time for encounter was 20 minutes including same day preparation prior to encounter (e.g. reviewing labs, prior notes), face to face time, counseling and coordination of care and ordering medications, test, or procedures.

## 2022-04-28 ENCOUNTER — OFFICE VISIT (OUTPATIENT)
Dept: OBSTETRICS AND GYNECOLOGY | Facility: CLINIC | Age: 47
End: 2022-04-28

## 2022-04-28 VITALS
BODY MASS INDEX: 31.66 KG/M2 | WEIGHT: 197 LBS | HEART RATE: 81 BPM | DIASTOLIC BLOOD PRESSURE: 75 MMHG | SYSTOLIC BLOOD PRESSURE: 117 MMHG | HEIGHT: 66 IN

## 2022-04-28 DIAGNOSIS — N93.9 ABNORMAL UTERINE BLEEDING: Primary | ICD-10-CM

## 2022-04-28 DIAGNOSIS — N94.6 DYSMENORRHEA: ICD-10-CM

## 2022-04-28 PROCEDURE — 99213 OFFICE O/P EST LOW 20 MIN: CPT | Performed by: OBSTETRICS & GYNECOLOGY

## 2022-04-28 NOTE — PROGRESS NOTES
"GYN Problem/Follow Up Visit    Chief Complaint   Patient presents with   • ABNORMAL BLEEDING WITH MIRENA           HPI  Geri Bowie is a 47 y.o. female, , who presents for aub. Has hx of heavy painful menses and she has tried depo, bcp, and now mirena. Dr ovalle had discussed hysterectomy if mirena did not help. Pt states the bleeding is not consistently heavy since mirena but very frequent and will pass clots. Also c/o cramping with bleeding.        Additional OB/GYN History   Patient's last menstrual period was 2022.  Current contraception: contraceptive methods: Vasectomy   Desires to: continue contraception  Allergies : Amoxicillin and Penicillins     The additional following portions of the patient's history were reviewed and updated as appropriate: allergies, current medications, past family history, past medical history, past social history, past surgical history and problem list.    Review of Systems    I have reviewed and agree with the HPI, ROS, and historical information as entered above. Marissa Arzola, APRN    Objective   /75   Pulse 81   Ht 167.6 cm (66\")   Wt 89.4 kg (197 lb)   LMP 2022   BMI 31.80 kg/m²     Physical Exam  Vitals reviewed.   Abdominal:      Palpations: Abdomen is soft.      Tenderness: There is no abdominal tenderness.      Comments: No pelvic tenderness noted.   Skin:     General: Skin is warm and dry.   Neurological:      Mental Status: She is alert and oriented to person, place, and time.            Assessment and Plan    Diagnoses and all orders for this visit:    1. Abnormal uterine bleeding (Primary)  -     US Non-ob Transvaginal; Future    2. Dysmenorrhea  -     US Non-ob Transvaginal; Future    will check pelvic u/s. Pt states she does not want a hysterectomy but is interested in an ablation. Declines mirena removal today. Will have pt f/u with gyn doc for an eval.     Counseling:  She understands the importance of having the above orders " performed in a timely fashion.  She is encouraged to review her results online and/or contact or office if she has questions.     Follow Up:  Return for eval by gyn doc after u/s to discuss ablation/hyst.      Marissa Arzola, VELVET  04/28/2022

## 2022-05-04 ENCOUNTER — HOSPITAL ENCOUNTER (OUTPATIENT)
Dept: ULTRASOUND IMAGING | Facility: HOSPITAL | Age: 47
Discharge: HOME | End: 2022-05-04
Admitting: OBSTETRICS & GYNECOLOGY

## 2022-05-04 DIAGNOSIS — N93.9 ABNORMAL UTERINE BLEEDING: ICD-10-CM

## 2022-05-04 DIAGNOSIS — N94.6 DYSMENORRHEA: ICD-10-CM

## 2022-05-04 PROCEDURE — 76830 TRANSVAGINAL US NON-OB: CPT

## 2022-05-06 NOTE — PROGRESS NOTES
Discussed with patient that mirena had moved into her cervix and needs to be removed. Informed patient that recommendations was that Dr Damon can remove it when she sees him at upcomming appointment unless she has increased pain or bleeding and then she would need to get in sooner for iud removal.

## 2022-05-09 ENCOUNTER — PROCEDURE VISIT (OUTPATIENT)
Dept: OBSTETRICS AND GYNECOLOGY | Facility: CLINIC | Age: 47
End: 2022-05-09

## 2022-05-09 VITALS
BODY MASS INDEX: 31.96 KG/M2 | SYSTOLIC BLOOD PRESSURE: 123 MMHG | WEIGHT: 198 LBS | HEART RATE: 67 BPM | DIASTOLIC BLOOD PRESSURE: 80 MMHG

## 2022-05-09 DIAGNOSIS — Z30.432 ENCOUNTER FOR REMOVAL OF INTRAUTERINE CONTRACEPTIVE DEVICE (IUD): Primary | ICD-10-CM

## 2022-05-09 PROCEDURE — 58301 REMOVE INTRAUTERINE DEVICE: CPT | Performed by: OBSTETRICS & GYNECOLOGY

## 2022-05-09 NOTE — PROGRESS NOTES
GYN Problem/Follow Up Visit    Chief Complaint   Patient presents with   • IUD REMOVAL           HPI  Geri Bowie is a 47 y.o. female, , who presents for mirena removal. Pt sees dr ovalle. They tried mirena to help with her heavy painful menses but it has not helped. Has upcoming appt to discuss other tx options. Pelvic u/s showed that the mirena was in the cervix so the pt presents today for removal of iud. No new c/o voiced.        Additional OB/GYN History   No LMP recorded (lmp unknown).  Current contraception: contraceptive methods: Vasectomy   Desires to: continue contraception  Allergies : Amoxicillin and Penicillins     The additional following portions of the patient's history were reviewed and updated as appropriate: allergies, current medications, past family history, past medical history, past social history, past surgical history and problem list.    Review of Systems    I have reviewed and agree with the HPI, ROS, and historical information as entered above. VELVET Berry    Objective   /80   Pulse 67   Wt 89.8 kg (198 lb)   LMP  (LMP Unknown)   BMI 31.96 kg/m²     Physical Exam  Vitals reviewed.   Genitourinary:     General: Normal vulva.      Vagina: Normal.      Cervix: Normal.      Comments: iud string grasped with forceps and mirena removed intact without difficulty.   Skin:     General: Skin is warm and dry.   Neurological:      Mental Status: She is alert and oriented to person, place, and time.            Assessment and Plan    Diagnoses and all orders for this visit:    1. Encounter for removal of intrauterine contraceptive device (IUD) (Primary)    keep appt with dr scanlon to discuss tx options.     Counseling:  She understands the importance of having the above orders performed in a timely fashion.  She is encouraged to review her results online and/or contact or office if she has questions.     Follow Up:  Return for as scheduled with dr scanlon.      Marissa Arzola  APRN  05/09/2022

## 2022-05-14 ENCOUNTER — APPOINTMENT (OUTPATIENT)
Dept: GENERAL RADIOLOGY | Facility: HOSPITAL | Age: 47
End: 2022-05-14

## 2022-05-14 ENCOUNTER — HOSPITAL ENCOUNTER (EMERGENCY)
Facility: HOSPITAL | Age: 47
Discharge: HOME | End: 2022-05-14
Attending: EMERGENCY MEDICINE | Admitting: EMERGENCY MEDICINE

## 2022-05-14 VITALS
WEIGHT: 194.22 LBS | SYSTOLIC BLOOD PRESSURE: 117 MMHG | TEMPERATURE: 99.6 F | HEIGHT: 66 IN | DIASTOLIC BLOOD PRESSURE: 69 MMHG | HEART RATE: 86 BPM | RESPIRATION RATE: 18 BRPM | BODY MASS INDEX: 31.21 KG/M2 | OXYGEN SATURATION: 99 %

## 2022-05-14 DIAGNOSIS — Z20.822 ENCOUNTER FOR LABORATORY TESTING FOR COVID-19 VIRUS: ICD-10-CM

## 2022-05-14 DIAGNOSIS — J06.9 VIRAL URI WITH COUGH: Primary | ICD-10-CM

## 2022-05-14 LAB
FLUAV AG NPH QL: NEGATIVE
FLUBV AG NPH QL IA: NEGATIVE
S PYO AG THROAT QL: NEGATIVE
SARS-COV-2 RNA PNL SPEC NAA+PROBE: DETECTED

## 2022-05-14 PROCEDURE — 99283 EMERGENCY DEPT VISIT LOW MDM: CPT

## 2022-05-14 PROCEDURE — C9803 HOPD COVID-19 SPEC COLLECT: HCPCS | Performed by: EMERGENCY MEDICINE

## 2022-05-14 PROCEDURE — 87804 INFLUENZA ASSAY W/OPTIC: CPT

## 2022-05-14 PROCEDURE — 87880 STREP A ASSAY W/OPTIC: CPT

## 2022-05-14 PROCEDURE — 71045 X-RAY EXAM CHEST 1 VIEW: CPT

## 2022-05-14 PROCEDURE — U0004 COV-19 TEST NON-CDC HGH THRU: HCPCS

## 2022-05-14 PROCEDURE — 87081 CULTURE SCREEN ONLY: CPT | Performed by: EMERGENCY MEDICINE

## 2022-05-14 PROCEDURE — 87147 CULTURE TYPE IMMUNOLOGIC: CPT | Performed by: EMERGENCY MEDICINE

## 2022-05-14 RX ORDER — AZITHROMYCIN 250 MG/1
TABLET, FILM COATED ORAL
Qty: 6 TABLET | Refills: 0 | Status: SHIPPED | OUTPATIENT
Start: 2022-05-14 | End: 2022-05-25

## 2022-05-14 RX ORDER — DEXAMETHASONE 4 MG/1
4 TABLET ORAL 3 TIMES DAILY
Qty: 15 TABLET | Refills: 0 | Status: SHIPPED | OUTPATIENT
Start: 2022-05-14 | End: 2022-05-19

## 2022-05-14 RX ORDER — GUSELKUMAB 100 MG/ML
INJECTION SUBCUTANEOUS
COMMUNITY
Start: 2022-05-06

## 2022-05-14 RX ORDER — BROMPHENIRAMINE MALEATE, PSEUDOEPHEDRINE HYDROCHLORIDE, AND DEXTROMETHORPHAN HYDROBROMIDE 2; 30; 10 MG/5ML; MG/5ML; MG/5ML
10 SYRUP ORAL EVERY 6 HOURS PRN
Qty: 240 ML | Refills: 0 | Status: SHIPPED | OUTPATIENT
Start: 2022-05-14 | End: 2022-05-25

## 2022-05-14 NOTE — ED PROVIDER NOTES
Subjective   Pt presents to ED with complaint of cough for about 1 week, worse past 2 days with onset of body aches, congestion and sore throat. Has taken nyquil and tylenol for her symptoms, nothing today.          Review of Systems   Constitutional: Positive for fever.   HENT: Positive for congestion, postnasal drip and sore throat.    Eyes: Negative.    Respiratory: Positive for cough. Negative for shortness of breath.    Cardiovascular: Negative.  Negative for chest pain.   Gastrointestinal: Negative.    Endocrine: Negative.    Genitourinary: Negative.    Musculoskeletal: Negative.    Skin: Negative.    Allergic/Immunologic: Negative.    Neurological: Negative.    Hematological: Negative.    Psychiatric/Behavioral: Negative.        Past Medical History:   Diagnosis Date   • Abnormal Pap smear of cervix    • Cervical dysplasia    • Migraine    • Psoriasis (a type of skin inflammation)        Allergies   Allergen Reactions   • Amoxicillin Anaphylaxis   • Penicillins Hives       Past Surgical History:   Procedure Laterality Date   • ANKLE OPEN REDUCTION INTERNAL FIXATION Right 2016   • NEPHRECTOMY PARTIAL Right 03/2021    Kidney Cancer       History reviewed. No pertinent family history.    Social History     Socioeconomic History   • Marital status:    Tobacco Use   • Smoking status: Former Smoker     Packs/day: 0.50     Years: 30.00     Pack years: 15.00     Types: Cigarettes     Start date: 9/8/1991   • Smokeless tobacco: Never Used   Vaping Use   • Vaping Use: Never used   Substance and Sexual Activity   • Alcohol use: Not Currently   • Drug use: Never   • Sexual activity: Yes     Partners: Male     Birth control/protection: I.U.D.           Objective   Physical Exam  Constitutional:       Appearance: Normal appearance.   HENT:      Head: Normocephalic and atraumatic.      Right Ear: Tympanic membrane normal.      Left Ear: Tympanic membrane normal.      Nose: Nose normal.      Mouth/Throat:      Mouth:  Mucous membranes are moist.      Pharynx: Posterior oropharyngeal erythema present.   Eyes:      Pupils: Pupils are equal, round, and reactive to light.   Cardiovascular:      Rate and Rhythm: Normal rate and regular rhythm.      Pulses: Normal pulses.      Heart sounds: Normal heart sounds.   Pulmonary:      Effort: Pulmonary effort is normal.      Breath sounds: Examination of the right-lower field reveals decreased breath sounds. Examination of the left-lower field reveals decreased breath sounds. Decreased breath sounds present.   Abdominal:      General: Abdomen is flat. Bowel sounds are normal.      Palpations: Abdomen is soft.   Musculoskeletal:         General: Normal range of motion.      Cervical back: Normal range of motion.   Skin:     General: Skin is warm and dry.      Capillary Refill: Capillary refill takes less than 2 seconds.   Neurological:      General: No focal deficit present.      Mental Status: She is alert and oriented to person, place, and time. Mental status is at baseline.   Psychiatric:         Mood and Affect: Mood normal.         Procedures           ED Course                                                 MDM  Number of Diagnoses or Management Options  Encounter for laboratory testing for COVID-19 virus: new and requires workup  Viral URI with cough: new and requires workup     Amount and/or Complexity of Data Reviewed  Clinical lab tests: reviewed and ordered  Tests in the radiology section of CPT®: reviewed and ordered    Risk of Complications, Morbidity, and/or Mortality  Presenting problems: low  Diagnostic procedures: low  Management options: low    Patient Progress  Patient progress: stable      Final diagnoses:   Viral URI with cough   Encounter for laboratory testing for COVID-19 virus       ED Disposition  ED Disposition     ED Disposition   Discharge    Condition   Stable    Comment   --             iNcole Tapia MD  [address] [facility]  [address]  [phone]      As needed         Medication List      New Prescriptions    azithromycin 250 MG tablet  Commonly known as: ZITHROMAX  Take 2 tablets today, then take 1 tablet daily for 4 days     brompheniramine-pseudoephedrine-DM 30-2-10 MG/5ML syrup  Take 10 mL by mouth Every 6 (Six) Hours As Needed for Congestion or Cough.     dexamethasone 4 MG tablet  Commonly known as: DECADRON  Take 1 tablet by mouth 3 (Three) Times a Day for 5 days.           Where to Get Your Medications      [id number] - [place], [place] - [address] AT [facility] - [phone]  - [phone]   [address] [place] [address]    Hours: 24-hours Phone: [phone]   · azithromycin 250 MG tablet  · brompheniramine-pseudoephedrine-DM 30-2-10 MG/5ML syrup  · dexamethasone 4 MG tablet          Blessing Pardo, APRN  05/14/22 [id number]

## 2022-05-15 LAB — BACTERIA SPEC AEROBE CULT: ABNORMAL

## 2022-05-19 ENCOUNTER — HOSPITAL ENCOUNTER (EMERGENCY)
Facility: HOSPITAL | Age: 47
Discharge: HOME | End: 2022-05-20
Attending: EMERGENCY MEDICINE | Admitting: EMERGENCY MEDICINE

## 2022-05-19 ENCOUNTER — APPOINTMENT (OUTPATIENT)
Dept: GENERAL RADIOLOGY | Facility: HOSPITAL | Age: 47
End: 2022-05-19

## 2022-05-19 DIAGNOSIS — U07.1 COVID-19: Primary | ICD-10-CM

## 2022-05-19 DIAGNOSIS — J20.8 ACUTE BRONCHITIS DUE TO OTHER SPECIFIED ORGANISMS: ICD-10-CM

## 2022-05-19 LAB
ALBUMIN SERPL-MCNC: 4.2 G/DL (ref 3.5–5.2)
ALBUMIN/GLOB SERPL: 1.5 G/DL
ALP SERPL-CCNC: 61 U/L (ref 39–117)
ALT SERPL W P-5'-P-CCNC: 46 U/L (ref 1–33)
ANION GAP SERPL CALCULATED.3IONS-SCNC: 12.2 MMOL/L (ref 5–15)
AST SERPL-CCNC: 25 U/L (ref 1–32)
BASOPHILS # BLD AUTO: 0.03 10*3/MM3 (ref 0–0.2)
BASOPHILS NFR BLD AUTO: 0.2 % (ref 0–1.5)
BILIRUB SERPL-MCNC: 0.5 MG/DL (ref 0–1.2)
BUN SERPL-MCNC: 13 MG/DL (ref 6–20)
BUN/CREAT SERPL: 16 (ref 7–25)
CALCIUM SPEC-SCNC: 8.8 MG/DL (ref 8.6–10.5)
CHLORIDE SERPL-SCNC: 103 MMOL/L (ref 98–107)
CK MB SERPL-CCNC: <1 NG/ML
CK SERPL-CCNC: 53 U/L (ref 20–180)
CO2 SERPL-SCNC: 23.8 MMOL/L (ref 22–29)
CREAT SERPL-MCNC: 0.81 MG/DL (ref 0.57–1)
DEPRECATED RDW RBC AUTO: 44.6 FL (ref 37–54)
EGFRCR SERPLBLD CKD-EPI 2021: 90.2 ML/MIN/1.73
EOSINOPHIL # BLD AUTO: 0.01 10*3/MM3 (ref 0–0.4)
EOSINOPHIL NFR BLD AUTO: 0.1 % (ref 0.3–6.2)
ERYTHROCYTE [DISTWIDTH] IN BLOOD BY AUTOMATED COUNT: 12.4 % (ref 12.3–15.4)
GLOBULIN UR ELPH-MCNC: 2.8 GM/DL
GLUCOSE SERPL-MCNC: 99 MG/DL (ref 65–99)
HCT VFR BLD AUTO: 44.3 % (ref 34–46.6)
HGB BLD-MCNC: 15 G/DL (ref 12–15.9)
HOLD SPECIMEN: NORMAL
HOLD SPECIMEN: NORMAL
IMM GRANULOCYTES # BLD AUTO: 0.1 10*3/MM3 (ref 0–0.05)
IMM GRANULOCYTES NFR BLD AUTO: 0.7 % (ref 0–0.5)
LIPASE SERPL-CCNC: 24 U/L (ref 13–60)
LYMPHOCYTES # BLD AUTO: 3.67 10*3/MM3 (ref 0.7–3.1)
LYMPHOCYTES NFR BLD AUTO: 27.5 % (ref 19.6–45.3)
MAGNESIUM SERPL-MCNC: 2.2 MG/DL (ref 1.6–2.6)
MCH RBC QN AUTO: 32.5 PG (ref 26.6–33)
MCHC RBC AUTO-ENTMCNC: 33.9 G/DL (ref 31.5–35.7)
MCV RBC AUTO: 96.1 FL (ref 79–97)
MONOCYTES # BLD AUTO: 1.07 10*3/MM3 (ref 0.1–0.9)
MONOCYTES NFR BLD AUTO: 8 % (ref 5–12)
NEUTROPHILS NFR BLD AUTO: 63.5 % (ref 42.7–76)
NEUTROPHILS NFR BLD AUTO: 8.48 10*3/MM3 (ref 1.7–7)
NRBC BLD AUTO-RTO: 0 /100 WBC (ref 0–0.2)
NT-PROBNP SERPL-MCNC: 85.6 PG/ML (ref 0–450)
PLATELET # BLD AUTO: 262 10*3/MM3 (ref 140–450)
PMV BLD AUTO: 10.5 FL (ref 6–12)
POTASSIUM SERPL-SCNC: 3.8 MMOL/L (ref 3.5–5.2)
PROT SERPL-MCNC: 7 G/DL (ref 6–8.5)
RBC # BLD AUTO: 4.61 10*6/MM3 (ref 3.77–5.28)
SODIUM SERPL-SCNC: 139 MMOL/L (ref 136–145)
TROPONIN I SERPL-MCNC: 0 NG/ML (ref 0–0.6)
WBC NRBC COR # BLD: 13.36 10*3/MM3 (ref 3.4–10.8)
WHOLE BLOOD HOLD COAG: NORMAL
WHOLE BLOOD HOLD SPECIMEN: NORMAL

## 2022-05-19 PROCEDURE — 93010 ELECTROCARDIOGRAM REPORT: CPT | Performed by: INTERNAL MEDICINE

## 2022-05-19 PROCEDURE — 71045 X-RAY EXAM CHEST 1 VIEW: CPT

## 2022-05-19 PROCEDURE — 84484 ASSAY OF TROPONIN QUANT: CPT

## 2022-05-19 PROCEDURE — 80053 COMPREHEN METABOLIC PANEL: CPT

## 2022-05-19 PROCEDURE — 99283 EMERGENCY DEPT VISIT LOW MDM: CPT

## 2022-05-19 PROCEDURE — 93005 ELECTROCARDIOGRAM TRACING: CPT | Performed by: EMERGENCY MEDICINE

## 2022-05-19 PROCEDURE — 83735 ASSAY OF MAGNESIUM: CPT

## 2022-05-19 PROCEDURE — 83690 ASSAY OF LIPASE: CPT

## 2022-05-19 PROCEDURE — 82553 CREATINE MB FRACTION: CPT

## 2022-05-19 PROCEDURE — 36415 COLL VENOUS BLD VENIPUNCTURE: CPT

## 2022-05-19 PROCEDURE — 85025 COMPLETE CBC W/AUTO DIFF WBC: CPT

## 2022-05-19 PROCEDURE — 93005 ELECTROCARDIOGRAM TRACING: CPT

## 2022-05-19 PROCEDURE — 83880 ASSAY OF NATRIURETIC PEPTIDE: CPT

## 2022-05-19 PROCEDURE — 82550 ASSAY OF CK (CPK): CPT

## 2022-05-19 RX ORDER — ASPIRIN 81 MG/1
324 TABLET, CHEWABLE ORAL ONCE
Status: DISCONTINUED | OUTPATIENT
Start: 2022-05-19 | End: 2022-05-20 | Stop reason: HOSPADM

## 2022-05-19 RX ORDER — SODIUM CHLORIDE 0.9 % (FLUSH) 0.9 %
10 SYRINGE (ML) INJECTION AS NEEDED
Status: DISCONTINUED | OUTPATIENT
Start: 2022-05-19 | End: 2022-05-20 | Stop reason: HOSPADM

## 2022-05-20 VITALS
OXYGEN SATURATION: 99 % | HEIGHT: 66 IN | SYSTOLIC BLOOD PRESSURE: 117 MMHG | HEART RATE: 66 BPM | WEIGHT: 191.8 LBS | TEMPERATURE: 98.2 F | BODY MASS INDEX: 30.82 KG/M2 | RESPIRATION RATE: 15 BRPM | DIASTOLIC BLOOD PRESSURE: 78 MMHG

## 2022-05-20 LAB — TROPONIN I SERPL-MCNC: 0 NG/ML (ref 0–0.6)

## 2022-05-20 PROCEDURE — 94664 DEMO&/EVAL PT USE INHALER: CPT

## 2022-05-20 PROCEDURE — 94640 AIRWAY INHALATION TREATMENT: CPT

## 2022-05-20 PROCEDURE — 94799 UNLISTED PULMONARY SVC/PX: CPT

## 2022-05-20 PROCEDURE — 84484 ASSAY OF TROPONIN QUANT: CPT

## 2022-05-20 RX ORDER — ALBUTEROL SULFATE 90 UG/1
2 AEROSOL, METERED RESPIRATORY (INHALATION) EVERY 4 HOURS PRN
Qty: 1 G | Refills: 0 | Status: SHIPPED | OUTPATIENT
Start: 2022-05-20 | End: 2022-06-19

## 2022-05-20 RX ORDER — IPRATROPIUM BROMIDE AND ALBUTEROL SULFATE 2.5; .5 MG/3ML; MG/3ML
3 SOLUTION RESPIRATORY (INHALATION)
Status: COMPLETED | OUTPATIENT
Start: 2022-05-20 | End: 2022-05-20

## 2022-05-20 RX ADMIN — IPRATROPIUM BROMIDE AND ALBUTEROL SULFATE 3 ML: .5; 3 SOLUTION RESPIRATORY (INHALATION) at 01:15

## 2022-05-20 RX ADMIN — IPRATROPIUM BROMIDE AND ALBUTEROL SULFATE 3 ML: .5; 3 SOLUTION RESPIRATORY (INHALATION) at 01:10

## 2022-05-20 RX ADMIN — IPRATROPIUM BROMIDE AND ALBUTEROL SULFATE 3 ML: .5; 3 SOLUTION RESPIRATORY (INHALATION) at 01:20

## 2022-05-20 NOTE — ED PROVIDER NOTES
Time: 12:09 AM EDT  Arrived by: private car  Chief Complaint: SOB  History provided by: Patient  History is limited by: N/A     History of Present Illness:  Patient is a 47 y.o. year old female that presents to the emergency department with SOB and accompanying chest tightness that started approximately a week ago. She complains of a cough and sore throat but denies any rhinorrhea or congestion. Pt also reports chills and a fever as well as mild diarrhea but denies any emesis. She also says that her chest feels tight but does not list anything as improving or worsening her symptoms.     Partial nephrectomy done last year, hx of high cholesterol.    Pt admits to being a smoker but denies any chance of pregnancy. She recently had an IUD removed    Pt was diagnosed with COVID-19 on 05/14/2022 as well as strep throat. Pt was given antibiotics and steroids at that time but says they have not been effective.     HPI    Similar Symptoms Previously: SOB started approximately 1 week ago  Recently seen: Seen in the ED on 05/14/2022       Patient Care Team  Primary Care Provider: Nicole Tapia MD    Past Medical History:     Allergies   Allergen Reactions   • Amoxicillin Anaphylaxis   • Penicillins Hives     Past Medical History:   Diagnosis Date   • Abnormal Pap smear of cervix    • Cervical dysplasia    • Migraine    • Psoriasis (a type of skin inflammation)      Past Surgical History:   Procedure Laterality Date   • ANKLE OPEN REDUCTION INTERNAL FIXATION Right 2016   • NEPHRECTOMY PARTIAL Right 03/2021    Kidney Cancer     No family history on file.    Home Medications:  Prior to Admission medications    Medication Sig Start Date End Date Taking? Authorizing Provider   azithromycin (ZITHROMAX) 250 MG tablet Take 2 tablets today, then take 1 tablet daily for 4 days 5/14/22   Blessing Pardo APRN   brompheniramine-pseudoephedrine-DM 30-2-10 MG/5ML syrup Take 10 mL by mouth Every 6 (Six) Hours As Needed for Congestion  or Cough. 5/14/22   Blessing Pardo APRN   cholecalciferol (VITAMIN D3) 1.25 MG (62591 UT) capsule cholecalciferol (vitamin D3) 1,250 mcg (50,000 unit) capsule   Take 1 capsule every day by oral route.    Hollis Gonzalez MD   Cosentyx Sensoready, 300 MG, 150 MG/ML solution auto-injector  8/24/21   Hollis Gonzalez MD   cyanocobalamin (VITAMIN B-12) 1000 MCG tablet Vitamin B-12  1,000 mcg tablet   Take 1 tablet by oral route.   taken from clinic stock    Hollis Gonzalez MD   Cyanocobalamin 1000 MCG/15ML liquid Daily. 12/7/21   Hollis Gonzalez MD   dexamethasone (DECADRON) 4 MG tablet Take 1 tablet by mouth 3 (Three) Times a Day for 5 days. 5/14/22 5/19/22  Blessing Pardo APRN   rosuvastatin (CRESTOR) 20 MG tablet  7/30/21   Hollis Gonzalez MD   Tremfya 100 MG/ML solution pen-injector  5/6/22   Hollis Gonzalez MD   ustekinumab (Stelara) 45 MG/0.5ML injection 45 mg.    Hollis Gonzalez MD   vitamin D (ERGOCALCIFEROL) 1.25 MG (49429 UT) capsule capsule  7/30/21   Hollis Gonzalez MD        Social History:   Social History     Tobacco Use   • Smoking status: Former Smoker     Packs/day: 0.50     Years: 30.00     Pack years: 15.00     Types: Cigarettes     Start date: 9/8/1991   • Smokeless tobacco: Never Used   Vaping Use   • Vaping Use: Never used   Substance Use Topics   • Alcohol use: Not Currently   • Drug use: Never         Review of Systems:  Review of Systems   Constitutional: Positive for chills and fever. Negative for diaphoresis.   HENT: Positive for sore throat. Negative for congestion, postnasal drip and rhinorrhea.    Eyes: Negative for photophobia.   Respiratory: Positive for cough, chest tightness and shortness of breath.    Cardiovascular: Negative for chest pain, palpitations and leg swelling.   Gastrointestinal: Positive for diarrhea. Negative for abdominal pain, nausea and vomiting.   Genitourinary: Negative for difficulty urinating, dysuria, flank  "pain, frequency, hematuria and urgency.   Musculoskeletal: Negative for neck pain and neck stiffness.   Skin: Negative for pallor and rash.   Neurological: Negative for dizziness, syncope, weakness, numbness and headaches.   Hematological: Negative for adenopathy. Does not bruise/bleed easily.   Psychiatric/Behavioral: Negative.         Physical Exam:  /78   Pulse 66   Temp 98.2 °F (36.8 °C) (Oral)   Resp 15   Ht 167.6 cm (66\")   Wt 87 kg (191 lb 12.8 oz)   LMP  (LMP Unknown)   SpO2 99%   BMI 30.96 kg/m²     Physical Exam  Vitals and nursing note reviewed.   Constitutional:       General: She is not in acute distress.     Appearance: Normal appearance. She is not ill-appearing, toxic-appearing or diaphoretic.   HENT:      Head: Normocephalic and atraumatic.      Mouth/Throat:      Mouth: Mucous membranes are moist.      Pharynx: No posterior oropharyngeal erythema.   Eyes:      Pupils: Pupils are equal, round, and reactive to light.   Cardiovascular:      Rate and Rhythm: Normal rate and regular rhythm.      Pulses: Normal pulses.           Carotid pulses are 2+ on the right side and 2+ on the left side.       Radial pulses are 2+ on the right side and 2+ on the left side.        Femoral pulses are 2+ on the right side and 2+ on the left side.       Popliteal pulses are 2+ on the right side and 2+ on the left side.        Dorsalis pedis pulses are 2+ on the right side and 2+ on the left side.        Posterior tibial pulses are 2+ on the right side and 2+ on the left side.      Heart sounds: Normal heart sounds. No murmur heard.  Pulmonary:      Effort: Pulmonary effort is normal. No accessory muscle usage, respiratory distress or retractions.      Breath sounds: Decreased breath sounds (slightly) and wheezing (faint) present. No rhonchi or rales.   Abdominal:      General: Abdomen is flat. There is no distension.      Palpations: Abdomen is soft. There is no mass.      Tenderness: There is no abdominal " tenderness. There is no right CVA tenderness, left CVA tenderness, guarding or rebound.      Comments: No rigidity   Musculoskeletal:         General: No swelling, tenderness or deformity.      Cervical back: Neck supple. No tenderness.      Right lower leg: No edema.      Left lower leg: No edema.      Comments: Calves are non tender   Skin:     General: Skin is warm and dry.      Capillary Refill: Capillary refill takes less than 2 seconds.      Coloration: Skin is not jaundiced or pale.      Findings: No erythema.   Neurological:      General: No focal deficit present.      Mental Status: She is alert and oriented to person, place, and time. Mental status is at baseline.      Sensory: No sensory deficit.      Motor: No weakness.   Psychiatric:         Mood and Affect: Mood normal.         Behavior: Behavior normal.                Medications in the Emergency Department:  Medications   sodium chloride 0.9 % flush 10 mL (has no administration in time range)   aspirin chewable tablet 324 mg (has no administration in time range)   ipratropium-albuterol (DUO-NEB) nebulizer solution 3 mL (3 mL Nebulization Given 5/20/22 0120)        Labs  Lab Results (last 24 hours)     Procedure Component Value Units Date/Time    CBC & Differential [[id number]]  (Abnormal) Collected: 05/19/22 1626    Specimen: Blood from Arm, Left Updated: 05/19/22 1655    Narrative:      The following orders were created for panel order CBC & Differential.  Procedure                               Abnormality         Status                     ---------                               -----------         ------                     CBC Auto Differential[[id number]]        Abnormal            Final result                 Please view results for these tests on the individual orders.    Comprehensive Metabolic Panel [[id number]]  (Abnormal) Collected: 05/19/22 1626    Specimen: Blood from Arm, Left Updated: 05/19/22 1723     Glucose 99 mg/dL      BUN 13 mg/dL       Creatinine 0.81 mg/dL      Sodium 139 mmol/L      Potassium 3.8 mmol/L      Chloride 103 mmol/L      CO2 23.8 mmol/L      Calcium 8.8 mg/dL      Total Protein 7.0 g/dL      Albumin 4.20 g/dL      ALT (SGPT) 46 U/L      AST (SGOT) 25 U/L      Alkaline Phosphatase 61 U/L      Total Bilirubin 0.5 mg/dL      Globulin 2.8 gm/dL      A/G Ratio 1.5 g/dL      BUN/Creatinine Ratio 16.0     Anion Gap 12.2 mmol/L      eGFR 90.2 mL/min/1.73      Comment: National Kidney Foundation and American Society of Nephrology (ASN) Task Force recommended calculation based on the Chronic Kidney Disease Epidemiology Collaboration (CKD-EPI) equation refit without adjustment for race.       Narrative:      GFR Normal >60  Chronic Kidney Disease <60  Kidney Failure <15      Lipase [[id number]]  (Normal) Collected: 05/19/22 1626    Specimen: Blood from Arm, Left Updated: 05/19/22 1723     Lipase 24 U/L     BNP [[id number]]  (Normal) Collected: 05/19/22 1626    Specimen: Blood from Arm, Left Updated: 05/19/22 1718     proBNP 85.6 pg/mL     Narrative:      Among patients with dyspnea, NT-proBNP is highly sensitive for the detection of acute congestive heart failure. In addition NT-proBNP of <300 pg/ml effectively rules out acute congestive heart failure with 99% negative predictive value.    Results may be falsely decreased if patient taking Biotin.      Magnesium [[id number]]  (Normal) Collected: 05/19/22 1626    Specimen: Blood from Arm, Left Updated: 05/19/22 1723     Magnesium 2.2 mg/dL     CK Total & CKMB [[id number]]  (Normal) Collected: 05/19/22 1626    Specimen: Blood from Arm, Left Updated: 05/19/22 1723     CKMB <1.00 ng/mL      Creatine Kinase 53 U/L     Narrative:      CKMB results may be falsely decreased if patient taking Biotin.    CBC Auto Differential [[id number]]  (Abnormal) Collected: 05/19/22 1626    Specimen: Blood from Arm, Left Updated: 05/19/22 1655     WBC 13.36 10*3/mm3      RBC 4.61 10*6/mm3      Hemoglobin 15.0 g/dL       Hematocrit 44.3 %      MCV 96.1 fL      MCH 32.5 pg      MCHC 33.9 g/dL      RDW 12.4 %      RDW-SD 44.6 fl      MPV 10.5 fL      Platelets 262 10*3/mm3      Neutrophil % 63.5 %      Lymphocyte % 27.5 %      Monocyte % 8.0 %      Eosinophil % 0.1 %      Basophil % 0.2 %      Immature Grans % 0.7 %      Neutrophils, Absolute 8.48 10*3/mm3      Lymphocytes, Absolute 3.67 10*3/mm3      Monocytes, Absolute 1.07 10*3/mm3      Eosinophils, Absolute 0.01 10*3/mm3      Basophils, Absolute 0.03 10*3/mm3      Immature Grans, Absolute 0.10 10*3/mm3      nRBC 0.0 /100 WBC     POC Troponin I [[id number]]  (Normal) Collected: 05/19/22 1628    Specimen: Blood Updated: 05/19/22 1641     Troponin I 0.00 ng/mL      Comment: Serial Number: [id number]:  [id number]       POC Troponin I [[id number]]  (Normal) Collected: 05/20/22 0054    Specimen: Blood Updated: 05/20/22 0107     Troponin I 0.00 ng/mL      Comment: Serial Number: [id number]:  [id number]              Imaging:  XR Chest 1 View    Result Date: 5/19/2022  PROCEDURE: XR CHEST 1 VW  COMPARISON: [facility], CR, XR CHEST 1 VW, 5/14/2022, 13:08.  INDICATIONS: Chest Pain triage protocol/chest pain, shortness of breath. covid+ on 5/14/2022  FINDINGS:  The lungs are adequately expanded. There is no focal consolidation, pneumothorax, or obvious pleural effusion. The pulmonary vasculature appears within normal limits. The cardiac and mediastinal contours are within normal limits. The osseous structures appear intact.        No acute cardiopulmonary process.        LACEY VILLALOBOS MD       Electronically Signed and Approved By: LACEY VILLALOBOS MD on 5/19/2022 at 17:19               Procedures:  Procedures    Progress  ED Course as of 05/20/22 0242   Thu May 19, 2022   1618 EKG:    Rhythm: Sinus bradycardia  Rate: 54  Intervals: Normal MT and QT interval  T-wave: Isolated nonspecific T wave inversion III  ST Segment: Slight J-point elevation I and aVL.  However I do not see any  pathological ST elevation or ST depression    EKG Comparison: The QRS and ST morphology are unchanged from EKG performed March 24, 2021    Interpreted by me   [SD]   1956 EKG:    Rhythm: Sinus bradycardia  Rate: 50  Intervals: Normal KS and QT interval  T-wave: Isolated T wave inversion III which is nonspecific  ST Segment: J-point elevation in I and aVL.  There is no pathological ST ovation ST depression    EKG Comparison: QRS and ST morphology unchanged from the previous EKG performed in the department    Interpreted by me   [SD]      ED Course User Index  [SD] Herson Avina DO                            Medical Decision Making:  MDM  Number of Diagnoses or Management Options  Acute bronchitis due to other specified organisms  COVID-19  Diagnosis management comments:         The patient has a PERC score of 0    The patient's PERC score was negative.  I have discussed with the patient that they have a very low risk for pulmonary embolism.  I have discussed possibly performing a CAT scan of the chest with IV contrast to rule out pulmonary embolism.  However, due to the fact that the patient is very low risk for pulmonary embolism, they would prefer not to have a CAT scan of the chest at this time due to radiation exposure.    The patient had 2 troponins that were within normal limits.  The patient had 2 EKGs that demonstrated no evidence of ST segment elevation MI or other injury pattern      At the time of discharge, the patient appeared well, no distress and nontoxic.  The patient was in no respiratory distress including no tachypnea, accessory muscle use intercostal retractions.  The patient's vital signs were stable including normal room air pulse ox.  The patient will continue her steroids and antibiotics as previously prescribed.  The patient be placed on albuterol.  The patient will follow up with her primary care physician on Monday.  The patient was given very specific instructions on when and why to  return to the emergency room.  The patient voiced understanding and felt comfortable with those instructions.  Patient appears appropriate for discharge and outpatient follow-up.         Amount and/or Complexity of Data Reviewed  Clinical lab tests: reviewed  Tests in the radiology section of CPT®: reviewed  Tests in the medicine section of CPT®: reviewed                 Final diagnoses:   COVID-19   Acute bronchitis due to other specified organisms        Disposition:  ED Disposition     ED Disposition   Discharge    Condition   Stable    Comment   --             Documentation assistance provided by Dotty Irizarry acting as scribe for Herson Avina DO. Information recorded by the scribe was done at my direction and has been verified and validated by me.        Dotty Irizarry  05/20/22 0103       Herson Avina DO  05/21/22 0410

## 2022-05-20 NOTE — DISCHARGE INSTRUCTIONS
Please continue your dexamethasone and Zithromax as previously prescribed    Please use your inhaler every 4-6 hours as needed for shortness of breath and wheezing    Please stay quarantined at home until you are released by your primary care physician    Return to emergency room for increasing shortness of breath, chest tightness, chest pain, near passing out, passing out, unusual sweating, unusual fatigue, nausea vomiting or any new symptoms you are concerned with

## 2022-05-23 LAB — QT INTERVAL: 453 MS

## 2022-05-25 ENCOUNTER — OFFICE VISIT (OUTPATIENT)
Dept: OBSTETRICS AND GYNECOLOGY | Facility: CLINIC | Age: 47
End: 2022-05-25

## 2022-05-25 VITALS
SYSTOLIC BLOOD PRESSURE: 118 MMHG | DIASTOLIC BLOOD PRESSURE: 80 MMHG | HEIGHT: 66 IN | BODY MASS INDEX: 30.7 KG/M2 | WEIGHT: 191 LBS | HEART RATE: 94 BPM

## 2022-05-25 DIAGNOSIS — D25.0 SUBMUCOUS LEIOMYOMA OF UTERUS: Primary | ICD-10-CM

## 2022-05-25 PROBLEM — M12.811 ROTATOR CUFF ARTHROPATHY OF RIGHT SHOULDER: Status: RESOLVED | Noted: 2022-04-07 | Resolved: 2022-05-25

## 2022-05-25 PROBLEM — E03.9 HYPOTHYROIDISM: Status: RESOLVED | Noted: 2021-09-27 | Resolved: 2022-05-25

## 2022-05-25 PROBLEM — R11.0 NAUSEA: Status: RESOLVED | Noted: 2021-09-27 | Resolved: 2022-05-25

## 2022-05-25 PROBLEM — M19.079 PRIMARY LOCALIZED OSTEOARTHROSIS OF ANKLE AND FOOT: Status: RESOLVED | Noted: 2022-04-07 | Resolved: 2022-05-25

## 2022-05-25 PROBLEM — M25.579 PAIN IN JOINT, ANKLE AND FOOT: Status: RESOLVED | Noted: 2021-09-27 | Resolved: 2022-05-25

## 2022-05-25 PROCEDURE — 99213 OFFICE O/P EST LOW 20 MIN: CPT | Performed by: STUDENT IN AN ORGANIZED HEALTH CARE EDUCATION/TRAINING PROGRAM

## 2022-05-25 NOTE — PROGRESS NOTES
"GYN Problem/Follow Up Visit    Chief Complaint   Patient presents with   • Follow-up     Follow up US from  discuss Ablation            HPI  Geri Bowie is a 47 y.o. female, , who presents for year ago have constant menstrual. Two weeks of bleeding and then stopped. Then would start. Progesterone supplementation was done this did not help. Had an IUD placed in July of last year. Had constant bleeding with this. Had TVUS recently with removal of of IUD in office. After IUD out two weeks of bleeding and then no bleeding last 4-5 days. Reports having cramping with bleeding. Denies any lightheadedness, dizziness, SOA or CP.  Recenly diagnosed with COVID two weeks ago. Does report that she is still fatigued but getting better. On Tremfya for psoriasis. Not on any birth control       Additional OB/GYN History   Patient's last menstrual period was 2022.  Current contraception: contraceptive methods: None  Desires to: do not start contraception  Allergies : Amoxicillin and Penicillins     The additional following portions of the patient's history were reviewed and updated as appropriate: allergies, current medications, past family history, past medical history, past social history, past surgical history and problem list.    Review of Systems   Constitutional: Negative for fatigue and fever.   Respiratory: Negative for cough and shortness of breath.    Cardiovascular: Negative for chest pain.   Gastrointestinal: Negative for abdominal distention and abdominal pain.   Genitourinary: Positive for menstrual problem. Negative for difficulty urinating and dysuria.       I have reviewed and agree with the HPI, ROS, and historical information as entered above. Chavez Damon MD    Objective   /80   Pulse 94   Ht 167.6 cm (66\")   Wt 86.6 kg (191 lb)   LMP 2022   BMI 30.83 kg/m²     Physical Exam  Vitals and nursing note reviewed.   Constitutional:       General: She is not in acute distress.     " Appearance: Normal appearance. She is not toxic-appearing.   HENT:      Head: Normocephalic and atraumatic.   Eyes:      Extraocular Movements: Extraocular movements intact.      Conjunctiva/sclera: Conjunctivae normal.   Cardiovascular:      Pulses: Normal pulses.   Pulmonary:      Effort: Pulmonary effort is normal.   Abdominal:      General: There is no distension.      Palpations: Abdomen is soft.      Tenderness: There is no abdominal tenderness.   Genitourinary:     Comments: Deferred    Musculoskeletal:      Cervical back: Normal range of motion.   Skin:     General: Skin is warm and dry.   Neurological:      Mental Status: She is alert and oriented to person, place, and time.   Psychiatric:         Mood and Affect: Mood normal.         Behavior: Behavior normal.         Thought Content: Thought content normal.            Assessment and Plan  Geri Bowie is a 47 y.o.  presented for concerns of abnormal uterine bleeding.  This seems to be a longstanding problem for the patient.  Ultrasound imaging shows a fibroid that could be impinging upon the endometrial cavity and agree the source of her bleeding.  Recommendation for hysteroscopy and D&C and resection of fibroid.  Also tissue collection to make sure there is no signs of any endometrial cancer process.  Discussed with patient the potential of an ablative procedure following this evaluation if bleeding does not improve as well as hysterectomy.  At this point time would move forward with hysteroscopy and D&C and resection of fibroid.  Risk benefits and alternatives of surgery were discussed with patient.  Specifically the risk of bleeding, infection, injury to surrounding structures, risk of DVT PE/death discussed with patient.  Complications from anesthesia also discussed with patient.  Discussed that most we will do very well with nonnarcotic pain medication and that folks are feeling well several days after procedure.  We will look to schedule  this hopefully in the month of June.    Diagnoses and all orders for this visit:    1. Submucous leiomyoma of uterus (Primary)  -     Case Request; Standing  -     COVID PRE-OP / PRE-PROCEDURE SCREENING ORDER (NO ISOLATION) - Swab, Nasopharynx; Future  -     Case Request    Other orders  -     Follow Anesthesia Guidelines / Protocol; Future  -     Chlorhexidine Skin Prep; Future        Counseling:  • Bleeding precautions      She understands the importance of having the above orders performed in a timely fashion.  The risks of not performing them include, but are not limited to, cancer and/or subsequent increase in morbidity and/or mortality.  She is encouraged to review her results online and/or contact or office if she has questions.     Follow Up:  Return for Follow-up 2 weeks status post hysteroscopy and D&C.        Chavez Damon MD  05/25/2022

## 2022-05-31 ENCOUNTER — TELEPHONE (OUTPATIENT)
Dept: OBSTETRICS AND GYNECOLOGY | Facility: CLINIC | Age: 47
End: 2022-05-31

## 2022-06-01 LAB — QT INTERVAL: 418 MS

## 2022-07-21 PROBLEM — D25.0 SUBMUCOUS LEIOMYOMA OF UTERUS: Status: ACTIVE | Noted: 2022-07-21

## 2022-08-03 ENCOUNTER — OFFICE VISIT (OUTPATIENT)
Dept: OBSTETRICS AND GYNECOLOGY | Facility: CLINIC | Age: 47
End: 2022-08-03

## 2022-08-03 VITALS
BODY MASS INDEX: 30.99 KG/M2 | WEIGHT: 192 LBS | DIASTOLIC BLOOD PRESSURE: 82 MMHG | HEART RATE: 110 BPM | SYSTOLIC BLOOD PRESSURE: 121 MMHG

## 2022-08-03 DIAGNOSIS — Z01.818 PREOPERATIVE EXAM FOR GYNECOLOGIC SURGERY: Primary | ICD-10-CM

## 2022-08-03 PROCEDURE — 99213 OFFICE O/P EST LOW 20 MIN: CPT | Performed by: STUDENT IN AN ORGANIZED HEALTH CARE EDUCATION/TRAINING PROGRAM

## 2022-08-03 NOTE — PROGRESS NOTES
GYN Problem/Follow Up Visit    Chief Complaint   Patient presents with   • Pre-op Exam     DILATATION AND CURETTAGE HYSTEROSCOPY           HPI  Geri Bowie is a 47 y.o. female, , who presents for preoperative examination for dilatation and curettage and hysteroscopy for fibroid.   Patient reports she still had ongoing bleeding.  Otherwise denies any changes to her medical status or surgical status.  Denies any fevers, chills, shortness of breath or chest pain.  She reports that she is going be going through a divorce and was wondering if she could have ablative procedure done at the same time of hysteroscopy and D&C.    Additional OB/GYN History   Patient's last menstrual period was 2022 (approximate).  Current contraception: contraceptive methods: Abstinence  Desires to: continue contraception  Allergies : Amoxicillin and Penicillins     The additional following portions of the patient's history were reviewed and updated as appropriate: allergies, current medications, past family history, past medical history, past social history, past surgical history and problem list.    Review of Systems   Constitutional: Negative for fatigue and fever.   Respiratory: Negative for cough and shortness of breath.    Cardiovascular: Negative for chest pain.   Gastrointestinal: Negative for abdominal distention and abdominal pain.   Genitourinary: Positive for menstrual problem. Negative for difficulty urinating and dysuria.       I have reviewed and agree with the HPI, ROS, and historical information as entered above. Chavez Damon MD    Objective   /82   Pulse 110   Wt 87.1 kg (192 lb)   LMP 2022 (Approximate)   BMI 30.99 kg/m²     Physical Exam  Vitals and nursing note reviewed.   Constitutional:       General: She is not in acute distress.     Appearance: Normal appearance. She is not toxic-appearing.   HENT:      Head: Normocephalic and atraumatic.   Eyes:      Extraocular Movements:  Extraocular movements intact.      Conjunctiva/sclera: Conjunctivae normal.   Cardiovascular:      Pulses: Normal pulses.   Pulmonary:      Effort: Pulmonary effort is normal.   Abdominal:      General: There is no distension.      Palpations: Abdomen is soft.      Tenderness: There is no abdominal tenderness.   Genitourinary:     Comments: Deferred  Musculoskeletal:      Cervical back: Normal range of motion.   Skin:     General: Skin is warm and dry.   Neurological:      Mental Status: She is alert and oriented to person, place, and time.   Psychiatric:         Mood and Affect: Mood normal.         Behavior: Behavior normal.         Thought Content: Thought content normal.            Assessment and Plan  Geri Bowie is a 47 y.o.  presenting for preoperative evaluation.  Discussed with patient that ultrasound has concerns of an anterior uterine pathology which could be causing her bleeding.  Discussed that ablative procedure requires a normal cavity and would not recommend ablation at this point time over a hysteroscopy and D&C.  Discussed with patient that she would have to give a good form of contraception in the form of sterilization procedure to have an ablative procedure performed by me.  Patient amendable to step of course of having hysteroscopy and D&C for evaluation for heavy bleeding.  If patient continues to have bleeding following procedure we will discuss next steps.    Diagnoses and all orders for this visit:    1. Preoperative exam for gynecologic surgery (Primary)        Counseling:  • Bleeding and infection precautions    She understands the importance of having the above orders performed in a timely fashion.  The risks of not performing them include, but are not limited to, cancer and/or subsequent increase in morbidity and/or mortality.  She is encouraged to review her results online and/or contact or office if she has questions.     Follow Up:  Return in about 4 weeks (around  8/31/2022) for Next scheduled follow up.      Chavez Damon MD  08/03/2022

## 2022-08-09 NOTE — PRE-PROCEDURE INSTRUCTIONS
IMPORTANT INSTRUCTIONS - PRE-ADMISSION TESTING  1. DO NOT EAT OR CHEW anything after midnight the night before your procedure.    2. You may have CLEAR liquids up to ____2__ hours prior to ARRIVAL time.   3. Take the following medications the morning of your procedure with JUST A SIP OF WATER:  ________CRESTOR_______________________________________________________________________________________________________________________________________________________________________________    4. DO NOT BRING your medications to the hospital with you, UNLESS something has changed since your PRE-Admission Testing appointment.  5. Hold all vitamins, supplements, and NSAIDS (Non- steroidal anti-inflammatory meds) for one week prior to surgery (you MAY take Tylenol or Acetaminophen).  6. If you are diabetic, check your blood sugar the morning of your procedure. If it is less than 70 or if you are feeling symptomatic, call the following number for further instructions: [phone]  7. Use your inhalers/nebulizers as usual, the morning of your procedure. BRING YOUR INHALERS with you.   8. Bring your CPAP or BIPAP to hospital, ONLY IF YOU WILL BE SPENDING THE NIGHT.   9. Make sure you have a ride home and have someone who will stay with you the day of your procedure after you go home.  10. If you have any questions, please call your Pre-Admission Testing Nurse, ___KEE_____________ at [phone]___________.   11. Per anesthesia request, do not smoke for 24 hours before your procedure or as instructed by your surgeon.   12. NO JEWELRY OR NAIL POLISH UPPER OR LOWER EXT DAY OF PROCEDURE  13. ELEVATOR A 3RD FLOOR

## 2022-08-15 ENCOUNTER — HOSPITAL ENCOUNTER (OUTPATIENT)
Facility: HOSPITAL | Age: 47
Setting detail: HOSPITAL OUTPATIENT SURGERY
Discharge: HOME | End: 2022-08-15
Attending: STUDENT IN AN ORGANIZED HEALTH CARE EDUCATION/TRAINING PROGRAM | Admitting: STUDENT IN AN ORGANIZED HEALTH CARE EDUCATION/TRAINING PROGRAM

## 2022-08-15 ENCOUNTER — ANESTHESIA EVENT (OUTPATIENT)
Dept: PERIOP | Facility: HOSPITAL | Age: 47
End: 2022-08-15

## 2022-08-15 ENCOUNTER — ANESTHESIA (OUTPATIENT)
Dept: PERIOP | Facility: HOSPITAL | Age: 47
End: 2022-08-15

## 2022-08-15 VITALS
TEMPERATURE: 97.2 F | WEIGHT: 193.12 LBS | RESPIRATION RATE: 20 BRPM | HEART RATE: 68 BPM | HEIGHT: 66 IN | SYSTOLIC BLOOD PRESSURE: 97 MMHG | OXYGEN SATURATION: 98 % | DIASTOLIC BLOOD PRESSURE: 62 MMHG | BODY MASS INDEX: 31.04 KG/M2

## 2022-08-15 DIAGNOSIS — D25.0 SUBMUCOUS LEIOMYOMA OF UTERUS: ICD-10-CM

## 2022-08-15 LAB
B-HCG UR QL: NEGATIVE
BASOPHILS # BLD AUTO: 0.04 10*3/MM3 (ref 0–0.2)
BASOPHILS NFR BLD AUTO: 0.5 % (ref 0–1.5)
DEPRECATED RDW RBC AUTO: 44.8 FL (ref 37–54)
EOSINOPHIL # BLD AUTO: 0.04 10*3/MM3 (ref 0–0.4)
EOSINOPHIL NFR BLD AUTO: 0.5 % (ref 0.3–6.2)
ERYTHROCYTE [DISTWIDTH] IN BLOOD BY AUTOMATED COUNT: 12.8 % (ref 12.3–15.4)
HCT VFR BLD AUTO: 40.9 % (ref 34–46.6)
HGB BLD-MCNC: 13.8 G/DL (ref 12–15.9)
IMM GRANULOCYTES # BLD AUTO: 0.02 10*3/MM3 (ref 0–0.05)
IMM GRANULOCYTES NFR BLD AUTO: 0.2 % (ref 0–0.5)
LYMPHOCYTES # BLD AUTO: 3.05 10*3/MM3 (ref 0.7–3.1)
LYMPHOCYTES NFR BLD AUTO: 36.7 % (ref 19.6–45.3)
MCH RBC QN AUTO: 32.5 PG (ref 26.6–33)
MCHC RBC AUTO-ENTMCNC: 33.7 G/DL (ref 31.5–35.7)
MCV RBC AUTO: 96.2 FL (ref 79–97)
MONOCYTES # BLD AUTO: 0.5 10*3/MM3 (ref 0.1–0.9)
MONOCYTES NFR BLD AUTO: 6 % (ref 5–12)
NEUTROPHILS NFR BLD AUTO: 4.67 10*3/MM3 (ref 1.7–7)
NEUTROPHILS NFR BLD AUTO: 56.1 % (ref 42.7–76)
NRBC BLD AUTO-RTO: 0 /100 WBC (ref 0–0.2)
PLATELET # BLD AUTO: 261 10*3/MM3 (ref 140–450)
PMV BLD AUTO: 10.4 FL (ref 6–12)
RBC # BLD AUTO: 4.25 10*6/MM3 (ref 3.77–5.28)
WBC NRBC COR # BLD: 8.32 10*3/MM3 (ref 3.4–10.8)

## 2022-08-15 PROCEDURE — 25010000002 PROPOFOL 10 MG/ML EMULSION: Performed by: NURSE ANESTHETIST, CERTIFIED REGISTERED

## 2022-08-15 PROCEDURE — 25010000002 KETOROLAC TROMETHAMINE PER 15 MG: Performed by: NURSE ANESTHETIST, CERTIFIED REGISTERED

## 2022-08-15 PROCEDURE — C1782 MORCELLATOR: HCPCS | Performed by: STUDENT IN AN ORGANIZED HEALTH CARE EDUCATION/TRAINING PROGRAM

## 2022-08-15 PROCEDURE — 25010000002 FENTANYL CITRATE (PF) 50 MCG/ML SOLUTION: Performed by: NURSE ANESTHETIST, CERTIFIED REGISTERED

## 2022-08-15 PROCEDURE — 25010000002 DEXAMETHASONE PER 1 MG: Performed by: NURSE ANESTHETIST, CERTIFIED REGISTERED

## 2022-08-15 PROCEDURE — 88305 TISSUE EXAM BY PATHOLOGIST: CPT | Performed by: STUDENT IN AN ORGANIZED HEALTH CARE EDUCATION/TRAINING PROGRAM

## 2022-08-15 PROCEDURE — 25010000002 ONDANSETRON PER 1 MG: Performed by: NURSE ANESTHETIST, CERTIFIED REGISTERED

## 2022-08-15 PROCEDURE — 81025 URINE PREGNANCY TEST: CPT | Performed by: ANESTHESIOLOGY

## 2022-08-15 PROCEDURE — 85025 COMPLETE CBC W/AUTO DIFF WBC: CPT | Performed by: STUDENT IN AN ORGANIZED HEALTH CARE EDUCATION/TRAINING PROGRAM

## 2022-08-15 PROCEDURE — 25010000002 MIDAZOLAM PER 1 MG: Performed by: ANESTHESIOLOGY

## 2022-08-15 PROCEDURE — 58558 HYSTEROSCOPY BIOPSY: CPT | Performed by: STUDENT IN AN ORGANIZED HEALTH CARE EDUCATION/TRAINING PROGRAM

## 2022-08-15 PROCEDURE — S0260 H&P FOR SURGERY: HCPCS | Performed by: STUDENT IN AN ORGANIZED HEALTH CARE EDUCATION/TRAINING PROGRAM

## 2022-08-15 RX ORDER — MIDAZOLAM HYDROCHLORIDE 1 MG/ML
2 INJECTION INTRAMUSCULAR; INTRAVENOUS ONCE
Status: COMPLETED | OUTPATIENT
Start: 2022-08-15 | End: 2022-08-15

## 2022-08-15 RX ORDER — SODIUM CHLORIDE, SODIUM LACTATE, POTASSIUM CHLORIDE, CALCIUM CHLORIDE 600; 310; 30; 20 MG/100ML; MG/100ML; MG/100ML; MG/100ML
9 INJECTION, SOLUTION INTRAVENOUS CONTINUOUS PRN
Status: DISCONTINUED | OUTPATIENT
Start: 2022-08-15 | End: 2022-08-15 | Stop reason: HOSPADM

## 2022-08-15 RX ORDER — PROMETHAZINE HYDROCHLORIDE 12.5 MG/1
12.5 TABLET ORAL ONCE AS NEEDED
Status: DISCONTINUED | OUTPATIENT
Start: 2022-08-15 | End: 2022-08-15 | Stop reason: HOSPADM

## 2022-08-15 RX ORDER — DEXAMETHASONE SODIUM PHOSPHATE 4 MG/ML
INJECTION, SOLUTION INTRA-ARTICULAR; INTRALESIONAL; INTRAMUSCULAR; INTRAVENOUS; SOFT TISSUE AS NEEDED
Status: DISCONTINUED | OUTPATIENT
Start: 2022-08-15 | End: 2022-08-15 | Stop reason: SURG

## 2022-08-15 RX ORDER — FENTANYL CITRATE 50 UG/ML
INJECTION, SOLUTION INTRAMUSCULAR; INTRAVENOUS AS NEEDED
Status: DISCONTINUED | OUTPATIENT
Start: 2022-08-15 | End: 2022-08-15 | Stop reason: SURG

## 2022-08-15 RX ORDER — ACETAMINOPHEN 500 MG
1000 TABLET ORAL ONCE
Status: COMPLETED | OUTPATIENT
Start: 2022-08-15 | End: 2022-08-15

## 2022-08-15 RX ORDER — PROMETHAZINE HYDROCHLORIDE 25 MG/1
25 SUPPOSITORY RECTAL ONCE AS NEEDED
Status: DISCONTINUED | OUTPATIENT
Start: 2022-08-15 | End: 2022-08-15 | Stop reason: HOSPADM

## 2022-08-15 RX ORDER — MEPERIDINE HYDROCHLORIDE 25 MG/ML
12.5 INJECTION INTRAMUSCULAR; INTRAVENOUS; SUBCUTANEOUS
Status: DISCONTINUED | OUTPATIENT
Start: 2022-08-15 | End: 2022-08-15 | Stop reason: HOSPADM

## 2022-08-15 RX ORDER — EPHEDRINE SULFATE 50 MG/ML
INJECTION, SOLUTION INTRAVENOUS AS NEEDED
Status: DISCONTINUED | OUTPATIENT
Start: 2022-08-15 | End: 2022-08-15 | Stop reason: SURG

## 2022-08-15 RX ORDER — OXYCODONE HYDROCHLORIDE 5 MG/1
5 TABLET ORAL
Status: DISCONTINUED | OUTPATIENT
Start: 2022-08-15 | End: 2022-08-15 | Stop reason: HOSPADM

## 2022-08-15 RX ORDER — IBUPROFEN 600 MG/1
600 TABLET ORAL EVERY 6 HOURS PRN
Qty: 20 TABLET | Refills: 0 | Status: SHIPPED | OUTPATIENT
Start: 2022-08-15 | End: 2022-08-20

## 2022-08-15 RX ORDER — KETOROLAC TROMETHAMINE 30 MG/ML
INJECTION, SOLUTION INTRAMUSCULAR; INTRAVENOUS AS NEEDED
Status: DISCONTINUED | OUTPATIENT
Start: 2022-08-15 | End: 2022-08-15 | Stop reason: SURG

## 2022-08-15 RX ORDER — LIDOCAINE HYDROCHLORIDE 20 MG/ML
INJECTION, SOLUTION EPIDURAL; INFILTRATION; INTRACAUDAL; PERINEURAL AS NEEDED
Status: DISCONTINUED | OUTPATIENT
Start: 2022-08-15 | End: 2022-08-15 | Stop reason: SURG

## 2022-08-15 RX ORDER — PROMETHAZINE HYDROCHLORIDE 12.5 MG/1
25 TABLET ORAL ONCE AS NEEDED
Status: DISCONTINUED | OUTPATIENT
Start: 2022-08-15 | End: 2022-08-15 | Stop reason: HOSPADM

## 2022-08-15 RX ORDER — MAGNESIUM HYDROXIDE 1200 MG/15ML
LIQUID ORAL AS NEEDED
Status: DISCONTINUED | OUTPATIENT
Start: 2022-08-15 | End: 2022-08-15 | Stop reason: HOSPADM

## 2022-08-15 RX ORDER — ACETAMINOPHEN 500 MG
1000 TABLET ORAL EVERY 6 HOURS
Qty: 40 TABLET | Refills: 0 | Status: SHIPPED | OUTPATIENT
Start: 2022-08-15 | End: 2022-08-20

## 2022-08-15 RX ORDER — ONDANSETRON 2 MG/ML
INJECTION INTRAMUSCULAR; INTRAVENOUS AS NEEDED
Status: DISCONTINUED | OUTPATIENT
Start: 2022-08-15 | End: 2022-08-15 | Stop reason: SURG

## 2022-08-15 RX ORDER — PROPOFOL 10 MG/ML
VIAL (ML) INTRAVENOUS AS NEEDED
Status: DISCONTINUED | OUTPATIENT
Start: 2022-08-15 | End: 2022-08-15 | Stop reason: SURG

## 2022-08-15 RX ORDER — ONDANSETRON 2 MG/ML
4 INJECTION INTRAMUSCULAR; INTRAVENOUS ONCE AS NEEDED
Status: DISCONTINUED | OUTPATIENT
Start: 2022-08-15 | End: 2022-08-15 | Stop reason: HOSPADM

## 2022-08-15 RX ORDER — ACETAMINOPHEN 325 MG/1
650 TABLET ORAL ONCE
Status: DISCONTINUED | OUTPATIENT
Start: 2022-08-15 | End: 2022-08-15 | Stop reason: HOSPADM

## 2022-08-15 RX ADMIN — MIDAZOLAM HYDROCHLORIDE 2 MG: 2 INJECTION, SOLUTION INTRAMUSCULAR; INTRAVENOUS at 10:19

## 2022-08-15 RX ADMIN — EPHEDRINE SULFATE 10 MG: 50 INJECTION INTRAVENOUS at 11:10

## 2022-08-15 RX ADMIN — FENTANYL CITRATE 50 MCG: 50 INJECTION, SOLUTION INTRAMUSCULAR; INTRAVENOUS at 11:09

## 2022-08-15 RX ADMIN — ONDANSETRON 4 MG: 2 INJECTION INTRAMUSCULAR; INTRAVENOUS at 11:02

## 2022-08-15 RX ADMIN — LIDOCAINE HYDROCHLORIDE 100 MG: 20 INJECTION, SOLUTION EPIDURAL; INFILTRATION; INTRACAUDAL; PERINEURAL at 10:47

## 2022-08-15 RX ADMIN — KETOROLAC TROMETHAMINE 30 MG: 30 INJECTION, SOLUTION INTRAMUSCULAR; INTRAVENOUS at 11:04

## 2022-08-15 RX ADMIN — SODIUM CHLORIDE, POTASSIUM CHLORIDE, SODIUM LACTATE AND CALCIUM CHLORIDE: 600; 310; 30; 20 INJECTION, SOLUTION INTRAVENOUS at 11:24

## 2022-08-15 RX ADMIN — PROPOFOL 200 MG: 10 INJECTION, EMULSION INTRAVENOUS at 10:47

## 2022-08-15 RX ADMIN — FENTANYL CITRATE 50 MCG: 50 INJECTION, SOLUTION INTRAMUSCULAR; INTRAVENOUS at 10:47

## 2022-08-15 RX ADMIN — ACETAMINOPHEN 1000 MG: 500 TABLET ORAL at 09:10

## 2022-08-15 RX ADMIN — DEXAMETHASONE SODIUM PHOSPHATE 8 MG: 4 INJECTION, SOLUTION INTRA-ARTICULAR; INTRALESIONAL; INTRAMUSCULAR; INTRAVENOUS; SOFT TISSUE at 11:02

## 2022-08-15 RX ADMIN — SODIUM CHLORIDE, POTASSIUM CHLORIDE, SODIUM LACTATE AND CALCIUM CHLORIDE 9 ML/HR: 600; 310; 30; 20 INJECTION, SOLUTION INTRAVENOUS at 09:06

## 2022-08-15 NOTE — ANESTHESIA PREPROCEDURE EVALUATION
Anesthesia Evaluation     Patient summary reviewed and Nursing notes reviewed   no history of anesthetic complications:  NPO Solid Status: > 8 hours  NPO Liquid Status: > 2 hours           Airway   Mallampati: II  TM distance: >3 FB  Neck ROM: full  No difficulty expected  Dental      Pulmonary - normal exam    breath sounds clear to auscultation  (+) sleep apnea,   Cardiovascular - normal exam  Exercise tolerance: good (4-7 METS)    Rhythm: regular  Rate: normal    (+) hyperlipidemia,       Neuro/Psych  (+) headaches,    GI/Hepatic/Renal/Endo    (+) obesity,   renal disease stones,     Musculoskeletal     Abdominal    Substance History - negative use     OB/GYN negative ob/gyn ROS         Other   arthritis,    history of cancer remission                    Anesthesia Plan    ASA 2     general   total IV anesthesia  (Patient understands anesthesia not responsible for dental damage.)  intravenous induction     Anesthetic plan, risks, benefits, and alternatives have been provided, discussed and informed consent has been obtained with: patient.    Use of blood products discussed with patient .   Plan discussed with CRNA.        CODE STATUS:

## 2022-08-15 NOTE — H&P
[facility]   PREOPERATIVE HISTORY AND PHYSICAL    Patient Name:Geri Bowie  : 1975  MRN: [id number]  Primary Care Physician: Nicole Tapia MD  Date of admission: 8/15/2022    Subjective   Subjective     Chief Complaint: preoperative evaluation    History of Present Illness  Geri Bowie is a 47 y.o. female who presents for preoperative evaluation. She is scheduled for DILATATION AND CURETTAGE HYSTEROSCOPY (N/A) for dysmenorrhea and menorrhagia.  Ultrasound demonstrating a submucosal fibroid.  She presents today for hysteroscopy D&C and resection of possible fibroid.    Review of Systems   Constitutional: Negative.    Respiratory: Negative for chest tightness, shortness of breath and wheezing.    Cardiovascular: Negative for chest pain, palpitations and leg swelling.   Gastrointestinal: Negative for abdominal pain, nausea and vomiting.   Genitourinary: Negative.    Musculoskeletal: Negative.    Skin: Negative.    Neurological: Negative.    Psychiatric/Behavioral: Negative.         Personal History     Past Medical History:   Diagnosis Date   • Abnormal Pap smear of cervix    • Cancer (HCC)     PARTIAL NEPHRECTOMY SURGICAL INTERVENTION ONLY   • Cervical dysplasia    • Hyperlipidemia    • Migraine    • Pain in joint, ankle and foot 2021   • Primary localized osteoarthrosis of ankle and foot 2022   • Psoriasis (a type of skin inflammation)    • Rotator cuff arthropathy of right shoulder 2022   • Submucous leiomyoma of uterus 2022       Past Surgical History:   Procedure Laterality Date   • ANKLE OPEN REDUCTION INTERNAL FIXATION Right    • NEPHRECTOMY PARTIAL Right 2021    Kidney Cancer       Family History: Her family history is not on file.     Social History: She  reports that she has been smoking cigarettes. She started smoking about 30 years ago. She has a 15.00 pack-year smoking history. She has never used smokeless tobacco. She reports previous alcohol use.  She reports that she does not use drugs.    Home Medications:  Guselkumab, cholecalciferol, and rosuvastatin    Allergies:  She is allergic to amoxicillin.    Objective    Objective     Vitals:    Temp:  [97.5 °F (36.4 °C)] 97.5 °F (36.4 °C)  Heart Rate:  [67] 67  Resp:  [18] 18  BP: (124)/(67) 124/67    Physical Exam  Vitals and nursing note reviewed.   Constitutional:       General: She is not in acute distress.     Appearance: Normal appearance. She is not toxic-appearing.   HENT:      Head: Normocephalic and atraumatic.   Eyes:      Extraocular Movements: Extraocular movements intact.      Conjunctiva/sclera: Conjunctivae normal.   Cardiovascular:      Pulses: Normal pulses.   Pulmonary:      Effort: Pulmonary effort is normal.   Abdominal:      General: There is no distension.      Palpations: Abdomen is soft.      Tenderness: There is no abdominal tenderness.   Musculoskeletal:      Cervical back: Normal range of motion.   Skin:     General: Skin is warm and dry.   Neurological:      Mental Status: She is alert and oriented to person, place, and time.   Psychiatric:         Mood and Affect: Mood normal.         Behavior: Behavior normal.         Thought Content: Thought content normal.         Assessment & Plan   Assessment / Plan     Brief Patient Summary:  Geri Bowie is a 47 y.o. female who presents for preoperative evaluation.    Pre-Op Diagnosis Codes:     * Submucous leiomyoma of uterus [D25.0]    Active Hospital Problems:  Active Hospital Problems    Diagnosis    • **Submucous leiomyoma of uterus      Added automatically from request for surgery [id number]       Plan:   Procedure(s):  DILATATION AND CURETTAGE HYSTEROSCOPY    The risks, benefits, and alternatives of the procedure including but not limited to bleeding, infection, injury surrounding structures, DVT/PE/death and risks of the anesthesia were discussed in detail with the patient and questions were answered. No guarantees were made or  implied. Informed consent was obtained.    Chavez Damon MD

## 2022-08-15 NOTE — OP NOTE
DILATATION AND CURETTAGE HYSTEROSCOPY  Operative Note    Geri Bowie  8/15/2022    Pre-op Diagnosis:   Submucous leiomyoma of uterus [D25.0]       Post-Op Diagnosis Codes:     * Submucous leiomyoma of uterus [D25.0]    Procedure/CPT® Codes:        Procedure(s):  DILATATION AND CURETTAGE HYSTEROSCOPY    Surgeon(s):  Chavez Damon MD    Anesthesia: Choice    Staff:   Circulator: Kylie Olson RN; Fernanda Andersen RN  Scrub Person: Tiffanie Galan  Other: Carolyn Burton RN         Estimated Blood Loss: 5 mL    Urine Voided: * No values recorded between 8/15/2022 10:43 AM and 8/15/2022 11:34 AM *    Specimens:                Specimens     ID Source Type Tests Collected By Collected At Frozen?    A Endometrium Curettings · TISSUE PATHOLOGY EXAM   Chavez Damon MD 8/15/22 1130     Description: endometrial curettings    This specimen was not marked as sent.                Drains: * No LDAs found *    Findings: Normal vaginal vault, multiparous cervix.  Anteverted uterus.  Sounding to 10 cm.  Lower anterior pathology most likely fibroid.  Normal-appearing tubal ostia bilaterally.     The patient was brought to the operating room where anesthesia was placed without difficulty and deemed to be adequate.  She was prepped and draped in a normal sterile fashion and placed in high lithotomy position.  I was then called into the room.  An exam under anesthesia was performed.  A speculum was placed in the vagina. epinephrine].  A single-tooth tenaculum] was placed on the cervix.  The uterus sounded to10] cm. The cervix was sequentially dilated to a size 15 Fr Mccoy.     Hysteroscopy was performed using NS as distention media.  Findings as above.  The myosure was used to resect all intrauterine lesions.  Total time of resection was 1 minute 37 seconds.  A sharp currettage was performed. All tissue was sent for permanent pathology.  The single-tooth tenaculum was removed from the anterior lip the cervix and was  noted to be hemostatic.  An exam under anesthesia revealed a small, firm uterus, with no active bleeding.    The patient tolerated the procedure well.  Instrument lap and needle counts were correct.   She is taken to recovery room in stable condition.      [The fluid deficit system was used.  Fluid deficit ~ [0 ]ml.    Complications: None     Chavez Damon MD     Date: 8/15/2022  Time: 11:38 EDT

## 2022-08-15 NOTE — ANESTHESIA POSTPROCEDURE EVALUATION
Patient: Geri Bowie    Procedure Summary     Date: 08/15/22 Room / Location: [facility] OR 02 / [facility] MAIN OR    Anesthesia Start: 1046 Anesthesia Stop: 1137    Procedure: DILATATION AND CURETTAGE HYSTEROSCOPY (N/A Uterus) Diagnosis:       Submucous leiomyoma of uterus      (Submucous leiomyoma of uterus [D25.0])    Surgeons: Chavez Damon MD Provider: Reyes, Mirabelle, DO    Anesthesia Type: general ASA Status: 2          Anesthesia Type: general    Vitals  Vitals Value Taken Time   /56 08/15/22 1200   Temp 36.1 °C (97 °F) 08/15/22 1200   Pulse 75 08/15/22 1203   Resp 16 08/15/22 1200   SpO2 95 % 08/15/22 1203   Vitals shown include unvalidated device data.        Anesthesia Post Evaluation

## 2022-08-15 NOTE — ANESTHESIA POSTPROCEDURE EVALUATION
Patient: Geri Bowie    Procedure Summary     Date: 08/15/22 Room / Location: [facility] OR 02 / [facility] MAIN OR    Anesthesia Start: 1046 Anesthesia Stop: 1137    Procedure: DILATATION AND CURETTAGE HYSTEROSCOPY (N/A Uterus) Diagnosis:       Submucous leiomyoma of uterus      (Submucous leiomyoma of uterus [D25.0])    Surgeons: Chavez Damon MD Provider: Reyes, Mirabelle, DO    Anesthesia Type: general ASA Status: 2          Anesthesia Type: general    Vitals  Vitals Value Taken Time   /56 08/15/22 1200   Temp 36.1 °C (97 °F) 08/15/22 1200   Pulse 75 08/15/22 1203   Resp 16 08/15/22 1200   SpO2 95 % 08/15/22 1203   Vitals shown include unvalidated device data.        Post Anesthesia Care and Evaluation    Patient location during evaluation: bedside  Patient participation: complete - patient participated  Level of consciousness: awake  Pain score: 0  Pain management: adequate    Airway patency: patent  Anesthetic complications: No anesthetic complications  PONV Status: none  Cardiovascular status: acceptable and stable  Respiratory status: acceptable and room air  Hydration status: acceptable    Comments: An Anesthesiologist personally participated in the most demanding procedures (including induction and emergence if applicable) in the anesthesia plan, monitored the course of anesthesia administration at frequent intervals and remained physically present and available for immediate diagnosis and treatment of emergencies.

## 2022-08-15 NOTE — DISCHARGE INSTRUCTIONS
DR. SHEPPARD'S DISCHARGE PRECAUTIONS and Answers to FAQs    NO SEX for [SIX] weeks.    NO TUB BATH or POOL for [TWO] week(s), shower only.  Sitz baths are fine.    STITCHES (if present):  wash them daily in the shower with soap and water (any type of soap is fine, it does not need to be antibacterial soap).Look at your stitches (the ones on the outside) when you get home.  You will then know what is normal and can have a point of reference to compare it to if you start to have concerns.  REDNESS, PUS, increase in PAIN, FEVER or CHILLS are all reasons to be seen our office immediately.  Go to the ER, if it is after hours or a weekend.      VAGINAL BLEEDING:   You should not be bleeding more than 1 large pad soaked every hour or two.  Clots (even the size of a lemon or larger) may be normal as long as the bleeding is not heavy and the clots do not continue.      FEVER or CHILLS or NOT FEELING WELL: call our office.  If the office is closed, you need to be seen in acute care or ER.      CHEST PAIN or SHORTNESS OF BREATH/AIR: you need to GO TO THE NEAREST ER or CALL 911.     SWELLING:   A red, painful, hot, swollen leg (usually just one side) can be a sign of a blood clot and should be evaluated immediately.  Call our office.  If it is after hours or a weekend, you must be seen IMMEDIATELY IN THE ER.       Any further QUESTIONS or CONCERNS, please call [facility] at [phone].

## 2022-08-17 LAB
CYTO UR: NORMAL
LAB AP CASE REPORT: NORMAL
LAB AP CLINICAL INFORMATION: NORMAL
PATH REPORT.FINAL DX SPEC: NORMAL
PATH REPORT.GROSS SPEC: NORMAL

## 2022-09-07 ENCOUNTER — OFFICE VISIT (OUTPATIENT)
Dept: OBSTETRICS AND GYNECOLOGY | Facility: CLINIC | Age: 47
End: 2022-09-07

## 2022-09-07 VITALS
WEIGHT: 193 LBS | BODY MASS INDEX: 31.15 KG/M2 | SYSTOLIC BLOOD PRESSURE: 120 MMHG | HEART RATE: 65 BPM | DIASTOLIC BLOOD PRESSURE: 78 MMHG

## 2022-09-07 DIAGNOSIS — N89.8 VAGINAL DISCHARGE: Primary | ICD-10-CM

## 2022-09-07 PROCEDURE — 87480 CANDIDA DNA DIR PROBE: CPT | Performed by: STUDENT IN AN ORGANIZED HEALTH CARE EDUCATION/TRAINING PROGRAM

## 2022-09-07 PROCEDURE — 87660 TRICHOMONAS VAGIN DIR PROBE: CPT | Performed by: STUDENT IN AN ORGANIZED HEALTH CARE EDUCATION/TRAINING PROGRAM

## 2022-09-07 PROCEDURE — 99024 POSTOP FOLLOW-UP VISIT: CPT | Performed by: STUDENT IN AN ORGANIZED HEALTH CARE EDUCATION/TRAINING PROGRAM

## 2022-09-07 PROCEDURE — 87086 URINE CULTURE/COLONY COUNT: CPT | Performed by: STUDENT IN AN ORGANIZED HEALTH CARE EDUCATION/TRAINING PROGRAM

## 2022-09-07 PROCEDURE — 87510 GARDNER VAG DNA DIR PROBE: CPT | Performed by: STUDENT IN AN ORGANIZED HEALTH CARE EDUCATION/TRAINING PROGRAM

## 2022-09-07 NOTE — PROGRESS NOTES
Post Operative Visit        Chief Complaint   Patient presents with   • Post-op     Vaginal Discharge     HPI:   Pain:  No; having some cramping  Vaginal bleeding:  One week after than stopped  Vaginal discharge:  Yes  Fever/chills:  No  Good appetite:  Yes  Normal bladder function:  Yes; reports that she is having urge to urinate, having some loss of urine   Normal bowel function:  Yes  Hot flashes: No    PHYSICAL EXAM:  /78   Pulse 65   Wt 87.5 kg (193 lb)   BMI 31.15 kg/m²   General- NAD, alert and oriented, appropriate  Psych- Normal mood, good memory    Abdomen- Soft, non distended, non tender, no masses    External genitalia- Normal, no lesions  Urethra- Normal, no masses, non tender  Vagina- NL no atrophy, no discharge, no prolapse  Bladder- Normal, no masses, non tender, no prolapse  Cvx- Normal, no lesions, no discharge, No cervical motion tenderness  Uterus- Normal size, shape & consistency.  Non tender, mobile, & no prolapse  Adnexa- LEFT normal, TENDER RIGHT    Lymphatic- No palpable groin nodes  Ext- No edema, no cyanosis   Skin- No lesions, no rashes    ASSESSMENT and PLAN:  Post-operative exam    Diagnoses and all orders for this visit:    1. Vaginal discharge (Primary)  -     Gardnerella vaginalis, Trichomonas vaginalis, Candida albicans, DNA - Swab, Vagina  -     Urine Culture - Urine, Urine, Clean Catch; Future  -     Urine Culture - Urine, Urine, Clean Catch        Operative report, surgical findings and any pathology/photos reviewed.  All questions answered.     Counseling:   • Ok to resume normal activities  • Ok to resume intercourse  • Return to school/work without limitations  TRACK MENSES, RTO if <q21d, >7d long, heavy or painful.      Follow Up:  Return in about 6 months (around 3/7/2023) for Next scheduled follow up.            Chavez Damon MD  09/07/2022    [facility]  [address] [place] [address]  Dept:  [phone]  Dept Fax: [phone]  Loc: [phone]  Loc Fax: [phone]

## 2022-09-08 LAB
BACTERIA SPEC AEROBE CULT: NO GROWTH
CANDIDA SPECIES: NEGATIVE
GARDNERELLA VAGINALIS: NEGATIVE
T VAGINALIS DNA VAG QL PROBE+SIG AMP: NEGATIVE

## 2022-09-26 ENCOUNTER — TRANSCRIBE ORDERS (OUTPATIENT)
Dept: ADMINISTRATIVE | Facility: HOSPITAL | Age: 47
End: 2022-09-26

## 2022-09-26 ENCOUNTER — HOSPITAL ENCOUNTER (OUTPATIENT)
Dept: GENERAL RADIOLOGY | Facility: HOSPITAL | Age: 47
Discharge: HOME | End: 2022-09-26
Admitting: DERMATOLOGY

## 2022-09-26 DIAGNOSIS — T50.A95A BCG CYSTITIS: ICD-10-CM

## 2022-09-26 DIAGNOSIS — N30.80 BCG CYSTITIS: ICD-10-CM

## 2022-09-26 DIAGNOSIS — L40.9 PSORIASIS: ICD-10-CM

## 2022-09-26 DIAGNOSIS — L40.9 PSORIASIS: Primary | ICD-10-CM

## 2022-09-26 PROCEDURE — 71046 X-RAY EXAM CHEST 2 VIEWS: CPT

## 2022-11-16 ENCOUNTER — HOSPITAL ENCOUNTER (EMERGENCY)
Facility: HOSPITAL | Age: 47
Discharge: HOME | End: 2022-11-16
Attending: EMERGENCY MEDICINE | Admitting: EMERGENCY MEDICINE

## 2022-11-16 ENCOUNTER — APPOINTMENT (OUTPATIENT)
Dept: GENERAL RADIOLOGY | Facility: HOSPITAL | Age: 47
End: 2022-11-16

## 2022-11-16 VITALS
RESPIRATION RATE: 18 BRPM | BODY MASS INDEX: 32.03 KG/M2 | SYSTOLIC BLOOD PRESSURE: 130 MMHG | OXYGEN SATURATION: 99 % | DIASTOLIC BLOOD PRESSURE: 72 MMHG | WEIGHT: 199.3 LBS | HEIGHT: 66 IN | TEMPERATURE: 98.6 F | HEART RATE: 70 BPM

## 2022-11-16 DIAGNOSIS — Z20.822 COVID-19 VIRUS TEST RESULT UNKNOWN: ICD-10-CM

## 2022-11-16 DIAGNOSIS — B34.9 VIRAL ILLNESS: Primary | ICD-10-CM

## 2022-11-16 LAB
FLUAV AG NPH QL: NEGATIVE
FLUBV AG NPH QL IA: NEGATIVE
S PYO AG THROAT QL: NEGATIVE
SARS-COV-2 RNA PNL SPEC NAA+PROBE: NOT DETECTED

## 2022-11-16 PROCEDURE — 87804 INFLUENZA ASSAY W/OPTIC: CPT

## 2022-11-16 PROCEDURE — 25010000002 KETOROLAC TROMETHAMINE PER 15 MG: Performed by: NURSE PRACTITIONER

## 2022-11-16 PROCEDURE — 71045 X-RAY EXAM CHEST 1 VIEW: CPT

## 2022-11-16 PROCEDURE — U0004 COV-19 TEST NON-CDC HGH THRU: HCPCS

## 2022-11-16 PROCEDURE — 87081 CULTURE SCREEN ONLY: CPT | Performed by: NURSE PRACTITIONER

## 2022-11-16 PROCEDURE — 87880 STREP A ASSAY W/OPTIC: CPT | Performed by: NURSE PRACTITIONER

## 2022-11-16 PROCEDURE — C9803 HOPD COVID-19 SPEC COLLECT: HCPCS | Performed by: EMERGENCY MEDICINE

## 2022-11-16 PROCEDURE — 99283 EMERGENCY DEPT VISIT LOW MDM: CPT

## 2022-11-16 PROCEDURE — 96372 THER/PROPH/DIAG INJ SC/IM: CPT

## 2022-11-16 RX ORDER — KETOROLAC TROMETHAMINE 30 MG/ML
30 INJECTION, SOLUTION INTRAMUSCULAR; INTRAVENOUS ONCE
Status: COMPLETED | OUTPATIENT
Start: 2022-11-16 | End: 2022-11-16

## 2022-11-16 RX ORDER — KETOROLAC TROMETHAMINE 10 MG/1
10 TABLET, FILM COATED ORAL EVERY 6 HOURS PRN
Qty: 20 TABLET | Refills: 0 | Status: SHIPPED | OUTPATIENT
Start: 2022-11-16 | End: 2023-02-14

## 2022-11-16 RX ADMIN — KETOROLAC TROMETHAMINE 30 MG: 30 INJECTION, SOLUTION INTRAMUSCULAR; INTRAVENOUS at 18:26

## 2022-11-16 NOTE — ED PROVIDER NOTES
"Time: 5:24 PM EST    Chief Complaint: Cough, nasal congestion, headache  History provided by: Patient  History is limited by: N/A     History of Present Illness:  Patient is a 47 y.o. year old female who presents to the emergency department with complaint of sneezing, cough, and nasal congestion that started on Sunday and has progressively worsened.  She also has a headache due to the coughing and some diarrhea.  She has taken NyQuil, Mucinex, Motrin, and says she has been wearing a fleece jacket to \"sweat it out\".        Patient Care Team  Primary Care Provider: Nicole Tapia MD    Past Medical History:     Allergies   Allergen Reactions   • Amoxicillin Anaphylaxis     Past Medical History:   Diagnosis Date   • Abnormal Pap smear of cervix    • Cancer (HCC)     PARTIAL NEPHRECTOMY SURGICAL INTERVENTION ONLY   • Cervical dysplasia    • Hyperlipidemia    • Migraine    • Pain in joint, ankle and foot 09/27/2021   • Primary localized osteoarthrosis of ankle and foot 04/07/2022   • Psoriasis (a type of skin inflammation)    • Rotator cuff arthropathy of right shoulder 04/07/2022   • Submucous leiomyoma of uterus 07/21/2022     Past Surgical History:   Procedure Laterality Date   • ANKLE OPEN REDUCTION INTERNAL FIXATION Right 2016   • D & C HYSTEROSCOPY N/A 8/15/2022    Procedure: DILATATION AND CURETTAGE HYSTEROSCOPY;  Surgeon: Chavez Damon MD;  Location: [facility] MAIN OR;  Service: Obstetrics/Gynecology;  Laterality: N/A;   • NEPHRECTOMY PARTIAL Right 03/2021    Kidney Cancer     Family History   Problem Relation Age of Onset   • Malig Hyperthermia Neg Hx        Home Medications:  Prior to Admission medications    Medication Sig Start Date End Date Taking? Authorizing Provider   cholecalciferol (VITAMIN D3) 1.25 MG (89581 UT) capsule Take 50,000 Units by mouth 1 (One) Time Per Week. TAKES ON MONDAY    Provider, MD Hollis   rosuvastatin (CRESTOR) 20 MG tablet Take 20 mg by mouth Daily. 7/30/21   Provider, " "MD Dean Shafer 100 MG/ML solution pen-injector TAKES EVERY 2 MONTH LAST DOSE 8/9/22 5/6/22   Provider, MD Hollis        Social History:   Social History     Tobacco Use   • Smoking status: Every Day     Packs/day: 0.50     Years: 30.00     Pack years: 15.00     Types: Cigarettes     Start date: 9/8/1991   • Smokeless tobacco: Never   • Tobacco comments:     LAST 0600 8/15/22   Vaping Use   • Vaping Use: Never used   Substance Use Topics   • Alcohol use: Not Currently   • Drug use: Never         Review of Systems:  Review of Systems   Constitutional: Negative for chills and fever.   HENT: Positive for congestion and sneezing. Negative for ear pain, rhinorrhea and sore throat.    Eyes: Negative for pain.   Respiratory: Positive for cough. Negative for shortness of breath.    Cardiovascular: Negative for chest pain.   Gastrointestinal: Positive for diarrhea. Negative for abdominal pain, nausea and vomiting.   Genitourinary: Negative for decreased urine volume, dysuria and flank pain.   Musculoskeletal: Negative for arthralgias and myalgias.   Skin: Negative for rash.   Neurological: Positive for headaches. Negative for seizures.   All other systems reviewed and are negative.       Physical Exam:  /72 (BP Location: Right arm, Patient Position: Sitting)   Pulse 70   Temp 98.6 °F (37 °C) (Oral)   Resp 18   Ht 167.6 cm (66\")   Wt 90.4 kg (199 lb 4.7 oz)   LMP  (LMP Unknown)   SpO2 99%   BMI 32.17 kg/m²     Physical Exam  Vitals and nursing note reviewed.   Constitutional:       General: She is not in acute distress.     Appearance: Normal appearance. She is normal weight. She is not ill-appearing, toxic-appearing or diaphoretic.   HENT:      Head: Normocephalic and atraumatic.      Right Ear: External ear normal.      Left Ear: External ear normal.   Eyes:      General: No scleral icterus.     Conjunctiva/sclera: Conjunctivae normal.      Pupils: Pupils are equal, round, and reactive to light. "   Cardiovascular:      Rate and Rhythm: Normal rate and regular rhythm.      Heart sounds: Normal heart sounds.   Pulmonary:      Effort: Pulmonary effort is normal. No respiratory distress.      Breath sounds: Normal breath sounds.   Abdominal:      General: Bowel sounds are normal. There is no distension.      Palpations: Abdomen is soft.      Tenderness: There is no abdominal tenderness.   Musculoskeletal:         General: No swelling, tenderness, deformity or signs of injury. Normal range of motion.      Cervical back: Normal range of motion and neck supple.   Skin:     General: Skin is warm and dry.      Capillary Refill: Capillary refill takes less than 2 seconds.   Neurological:      General: No focal deficit present.      Mental Status: She is alert and oriented to person, place, and time.   Psychiatric:         Mood and Affect: Mood normal.         Behavior: Behavior normal.                Medications in the Emergency Department:  Medications   ketorolac (TORADOL) injection 30 mg (30 mg Intramuscular Given 11/16/22 4142)        Labs  Lab Results (last 24 hours)     ** No results found for the last 24 hours. **           Imaging:  No Radiology Exams Resulted Within Past 24 Hours    Procedures:  Procedures    Progress                            Medical Decision Making:  MDM  Number of Diagnoses or Management Options  COVID-19 virus test result unknown: new and requires workup  Viral illness: new and requires workup     Amount and/or Complexity of Data Reviewed  Clinical lab tests: reviewed  Tests in the radiology section of CPT®: reviewed    Risk of Complications, Morbidity, and/or Mortality  Presenting problems: moderate  Diagnostic procedures: moderate  Management options: moderate    Patient Progress  Patient progress: stable       Final diagnoses:   Viral illness   COVID-19 virus test result unknown        Disposition:  ED Disposition     ED Disposition   Discharge    Condition   Stable    Comment   --              This medical record created using voice recognition software.           Angélica Staples, APRN  11/18/22 0028

## 2022-11-18 LAB — BACTERIA SPEC AEROBE CULT: NORMAL

## 2023-02-14 PROCEDURE — U0004 COV-19 TEST NON-CDC HGH THRU: HCPCS | Performed by: FAMILY MEDICINE

## 2023-03-09 ENCOUNTER — OFFICE VISIT (OUTPATIENT)
Dept: OBSTETRICS AND GYNECOLOGY | Facility: CLINIC | Age: 48
End: 2023-03-09
Payer: OTHER GOVERNMENT

## 2023-03-09 VITALS
HEART RATE: 82 BPM | BODY MASS INDEX: 32.12 KG/M2 | WEIGHT: 199 LBS | SYSTOLIC BLOOD PRESSURE: 125 MMHG | DIASTOLIC BLOOD PRESSURE: 77 MMHG

## 2023-03-09 DIAGNOSIS — N92.0 MENORRHAGIA WITH REGULAR CYCLE: ICD-10-CM

## 2023-03-09 DIAGNOSIS — Z01.419 WELL WOMAN EXAM WITH ROUTINE GYNECOLOGICAL EXAM: Primary | ICD-10-CM

## 2023-03-09 PROCEDURE — 99396 PREV VISIT EST AGE 40-64: CPT | Performed by: STUDENT IN AN ORGANIZED HEALTH CARE EDUCATION/TRAINING PROGRAM

## 2023-03-09 NOTE — PROGRESS NOTES
GYN Visit    No chief complaint on file.      HPI:   47 y.o. LMP: No LMP recorded. Patient is perimenopausal.     Social History     Substance and Sexual Activity   Sexual Activity Defer       Menses:   q *** days, lasts *** days, changes products q ***hrs on heaviest days.   Pain:  {APPAIN:62005}  {Urinary QUALITY measure 66yo (Optional):31525}  ***    History: PMHx, Meds, Allergies, PSHx, Social Hx, and POBHx all reviewed and updated.    PHYSICAL EXAM:  There were no vitals taken for this visit.  General- NAD, alert and oriented, appropriate  Psych- Normal mood, good memory    Neck- No masses, no thyroid enlargement  Cardiovascular- Regular rhythm, no murnurs  Respiratory- CTA to bases, no wheezes  Abdomen- Soft, non distended, non tender, no masses    Breast left-  Bilaterally symmetrical, no masses, non tender, no nipple discharge, no axillary or supraclavicular nodes palpable  Breast right- Bilaterally symmetrical, no masses, non tender, no nipple discharge, no axillary or supraclavicular nodes palpable    External genitalia- Normal female, no lesions  Urethra/meatus- Normal, no masses, non tender, no prolapse  Bladder- Normal, no masses, non tender, no prolapse  Vagina- Normal, no atrophy, no lesions, no discharge, no prolapse  Cervix- {APCVX:69789}  Uterus- {APUTERUS:07909}  Adnexa- {APADNEXA:61077}  Anus/Rectum/Perineum- {APRECTAL:13910}    Lymphatic- No palpable groin nodes  Extremities- No edema, no cyanosis    Skin- No lesions, no rashes  {APNEXPLANON (Optional):89633}    ASSESSMENT AND PLAN:  There are no diagnoses linked to this encounter.  {APGYNCOUNSELING (Optional):04709}  {Ectopic/MTX Treatment/FU/Plan (Optional):36907}  {Referral/Consult (Optional):61278}  Follow Up:  No follow-ups on file.    {Time Spent-Use for E/M Coding REQUIRED IF TELEMED (Optional):86937}  {Separate E+M Time Carve Out (Optional):57341}    Deja Bangura MA  2023    [facility]  [facility]  [facility] [place] [address]  Dept: [phone]  Dept Fax: [phone]  Loc: [phone]  Loc Fax: [phone]

## 2023-03-09 NOTE — PROGRESS NOTES
"Well Woman Visit    Chief Complaint   Patient presents with   • Follow-up           HPI  Geri Bowie is a 47 y.o. female, , who presents for annual well woman exam. Patient's last menstrual period was 2023 (exact date)..       The patient presents to follow up with the surgery that we performed in the fall of this past year.    The patien states following her surgery, she had a normal menses after 10/2022. She is unsure if she had a menses in 2022.  She did not have a menses in 2022 or 2023. She had a menses on 2023, which was \"awful\" and lasted 7 days. She was changing her pads every 30 minutes. Prior to that, her menses were more normal, and they lasted 6-7 days. She is sexually active sometimes, and she is not using condoms. Her  has had a vasectomy. Her last mammogram was in . She does not do breast examinations at home. She denies any issues with urination or bowel movements. She denies any incontinence. She denies any vaginal discharge causing irritation or odor. She takes Motrin 800 mg for abdominal pain.     She denies any family history of breast, uterine, ovarian, or colon cancer.      She has noticed a lot of excess body hair on her abdomen.    She has a history of appendectomy and a nephrectomy in . She denies having any C-sections or cholecystecomty.     She smokes about 0.5 a pack of cigarettes per day. She has been going to the gym, and she is hoping to get down to \"zero usage.\" She stopped smoking both times when she was pregnant, and then stopped after that a few times.    She takes vitamin D, Crestor, Tremfya, and allergy medication.    Additional OB/GYN History   Current contraception: contraceptive methods: Vasectomy   Desires to: possibly change to sterilization contraception  Last Pap :  NILM  Last Completed Pap Smear          PAP SMEAR (Every 3 Years) Next due on 2021  SCANNED - PAP SMEAR              Result: Normal  History " of abnormal Pap smear: no  Last MMG :   Last Completed Mammogram     This patient has no relevant Health Maintenance data.         Last Colonoscopy :   Last Completed Colonoscopy     This patient has no relevant Health Maintenance data.        Hx Myriad intake? : Yes.  Qualified? : No    AllergiesAmoxicillin   Family history of uterine, colon, breast, or ovarian cancer: no  Tobacco Usage?: Yes Geri Bowie  reports that she has been smoking cigarettes. She started smoking about 31 years ago. She has a 15.00 pack-year smoking history. She has never used smokeless tobacco.. I have educated her on the risk of diseases from using tobacco products such as cancer, COPD and heart disease.     I advised her to quit and she is willing to quit. We have discussed the following method/s for tobacco cessation:  Counseling.      I spent 3.5 minutes counseling the patient.        OB History        2    Para   2    Term   2            AB        Living   2       SAB        IAB        Ectopic        Molar        Multiple        Live Births                    The additional following portions of the patient's history were reviewed and updated as appropriate: allergies, current medications, past family history, past medical history, past social history, past surgical history and problem list.    Review of Systems   Constitutional: Negative for fatigue and fever.   Respiratory: Negative for cough and shortness of breath.    Cardiovascular: Negative for chest pain.   Gastrointestinal: Negative for abdominal distention and abdominal pain.   Genitourinary: Positive for menstrual problem. Negative for breast discharge, breast lump, breast pain, difficulty urinating, dyspareunia, dysuria, urgency, vaginal bleeding, vaginal discharge and vaginal pain.       I have reviewed and agree with the HPI, ROS, and historical information as entered above. Chavez Damon MD    Objective   /77   Pulse 82   Wt 90.3 kg (199 lb)    LMP 2023 (Exact Date) Comment: heavy period  Breastfeeding No   BMI 32.12 kg/m²     Physical Exam  Vitals and nursing note reviewed. Exam conducted with a chaperone present.   Constitutional:       General: She is not in acute distress.     Appearance: Normal appearance. She is not toxic-appearing.   HENT:      Head: Normocephalic and atraumatic.   Eyes:      Extraocular Movements: Extraocular movements intact.      Conjunctiva/sclera: Conjunctivae normal.   Cardiovascular:      Pulses: Normal pulses.   Pulmonary:      Effort: Pulmonary effort is normal.   Chest:   Breasts:     Joe Score is 5.      Right: Normal.      Left: Normal.   Abdominal:      General: There is no distension.      Palpations: Abdomen is soft.      Tenderness: There is no abdominal tenderness.   Genitourinary:     General: Normal vulva.      Joe stage (genital): 5.      Labia:         Right: No tenderness, lesion or injury.         Left: No tenderness, lesion or injury.       Vagina: Normal.      Cervix: No cervical motion tenderness, discharge, friability, lesion, erythema, cervical bleeding or eversion.      Uterus: Not deviated, not enlarged, not fixed, not tender and no uterine prolapse.       Adnexa: Right adnexa normal and left adnexa normal.      Comments: Nabothian cyst on cervix at 7:00  Musculoskeletal:      Cervical back: Normal range of motion.   Skin:     General: Skin is warm and dry.   Neurological:      Mental Status: She is alert and oriented to person, place, and time.   Psychiatric:         Mood and Affect: Mood normal.         Behavior: Behavior normal.         Thought Content: Thought content normal.            Assessment and Plan  Geri Bowie is a 47 y.o.  presenting for well woman examination and concerns for periods.  Patient went under a hysteroscopy D&C with MyoSure resection of the fibroid.  Recommendation for transvaginal ultrasound for any other intrauterine pathology.  Patient was given  information regarding IUD, sterilization and ablation, versus hysterectomy.  Will decide on routes of management for menorrhagia.  Does not sound that patient is having overtly heavy bleeding outside of last episode where she skipped a month.  This could have been related to an anovulatory cycle.  Return to office in 2 months.  Mammogram ordered    WWE and Problem Visit    Diagnoses and all orders for this visit:    1. Well woman exam with routine gynecological exam (Primary)  -     Mammo Screening Digital Tomosynthesis Bilateral With CAD; Future  -     US Non-ob Transvaginal; Future    2. Menorrhagia with regular cycle        Counseling:  • Bleeding and infection precautions    Preventative:  • MMG      She understands the importance of having the above performed in a timely fashion.  The risks of not performing them include, but are not limited to, advanced cancer stages, bone loss from osteoporosis and/or subsequent increase in morbidity and/or mortality.  She is encouraged to review her results online and/or contact or office if she has questions.     Follow Up:  Return in about 2 months (around 5/9/2023).      Chavez Damon MD  03/09/2023    Transcribed from ambient dictation for Chavez Damon MD by Leticia Rios.  03/09/23   17:32 EST    Patient or patient representative verbalized consent to the visit recording.  I have personally performed the services described in this document as transcribed by the above individual, and it is both accurate and complete.

## 2023-04-03 ENCOUNTER — APPOINTMENT (OUTPATIENT)
Dept: GENERAL RADIOLOGY | Facility: HOSPITAL | Age: 48
End: 2023-04-03
Payer: OTHER GOVERNMENT

## 2023-04-03 ENCOUNTER — HOSPITAL ENCOUNTER (EMERGENCY)
Facility: HOSPITAL | Age: 48
Discharge: HOME | End: 2023-04-03
Attending: EMERGENCY MEDICINE | Admitting: EMERGENCY MEDICINE
Payer: OTHER GOVERNMENT

## 2023-04-03 VITALS
OXYGEN SATURATION: 100 % | SYSTOLIC BLOOD PRESSURE: 149 MMHG | TEMPERATURE: 98.5 F | RESPIRATION RATE: 20 BRPM | DIASTOLIC BLOOD PRESSURE: 82 MMHG | HEART RATE: 82 BPM | BODY MASS INDEX: 36.89 KG/M2 | WEIGHT: 216.05 LBS | HEIGHT: 64 IN

## 2023-04-03 DIAGNOSIS — M25.571 ACUTE RIGHT ANKLE PAIN: Primary | ICD-10-CM

## 2023-04-03 DIAGNOSIS — M79.661 PAIN IN RIGHT LOWER LEG: ICD-10-CM

## 2023-04-03 PROCEDURE — 73610 X-RAY EXAM OF ANKLE: CPT

## 2023-04-03 PROCEDURE — 25010000002 DEXAMETHASONE SODIUM PHOSPHATE 10 MG/ML SOLUTION: Performed by: NURSE PRACTITIONER

## 2023-04-03 PROCEDURE — 25010000002 KETOROLAC TROMETHAMINE PER 15 MG: Performed by: NURSE PRACTITIONER

## 2023-04-03 PROCEDURE — 99283 EMERGENCY DEPT VISIT LOW MDM: CPT

## 2023-04-03 PROCEDURE — 96372 THER/PROPH/DIAG INJ SC/IM: CPT

## 2023-04-03 PROCEDURE — 97161 PT EVAL LOW COMPLEX 20 MIN: CPT | Performed by: PHYSICAL THERAPIST

## 2023-04-03 PROCEDURE — 97110 THERAPEUTIC EXERCISES: CPT | Performed by: PHYSICAL THERAPIST

## 2023-04-03 RX ORDER — DEXAMETHASONE SODIUM PHOSPHATE 10 MG/ML
10 INJECTION, SOLUTION INTRAMUSCULAR; INTRAVENOUS ONCE
Status: COMPLETED | OUTPATIENT
Start: 2023-04-03 | End: 2023-04-03

## 2023-04-03 RX ORDER — METHYLPREDNISOLONE 4 MG/1
TABLET ORAL
Qty: 21 TABLET | Refills: 0 | Status: SHIPPED | OUTPATIENT
Start: 2023-04-03

## 2023-04-03 RX ORDER — KETOROLAC TROMETHAMINE 30 MG/ML
30 INJECTION, SOLUTION INTRAMUSCULAR; INTRAVENOUS ONCE
Status: COMPLETED | OUTPATIENT
Start: 2023-04-03 | End: 2023-04-03

## 2023-04-03 RX ORDER — METHYLPREDNISOLONE 4 MG/1
TABLET ORAL
Qty: 21 TABLET | Refills: 0 | Status: SHIPPED | OUTPATIENT
Start: 2023-04-03 | End: 2023-04-03 | Stop reason: SDUPTHER

## 2023-04-03 RX ADMIN — DEXAMETHASONE SODIUM PHOSPHATE 10 MG: 10 INJECTION INTRAMUSCULAR; INTRAVENOUS at 12:13

## 2023-04-03 RX ADMIN — KETOROLAC TROMETHAMINE 30 MG: 30 INJECTION, SOLUTION INTRAMUSCULAR; INTRAVENOUS at 12:12

## 2023-04-03 NOTE — DISCHARGE INSTRUCTIONS
Your x-ray today was normal.  No evidence of change in your hardware from your previous surgery.  No new fractures or dislocations.  Please wear the boot and use the crutches until follow-up with orthopedics.  Please keep your foot elevated.  Apply ice frequently and intermittently.  Sending you home with some steroids to take.  Take as directed.  You could alternate Tylenol and ibuprofen every 4-6 hours as needed for pain or discomfort.  You did receive a shot of Toradol while here in the ER.  Do not take any more ibuprofen for at least 6 to 8 hours.  Return to the ER with any new or worsening symptoms.

## 2023-04-03 NOTE — THERAPY EVALUATION
Patient Name: Geri Bowie  : 1975    MRN: [id number]                              Today's Date: 4/3/2023       Admit Date: 4/3/2023    Visit Dx:     ICD-10-CM ICD-9-CM   1. Acute right ankle pain  M25.571 719.47     338.19   2. Pain in right lower leg  M79.661 729.5     Patient Active Problem List   Diagnosis   • Breast lump   • Hematochezia   • High cholesterol   • Pain of breast   • Psoriasis   • Abnormal glucose level   • Dysmenorrhea   • Malignant tumor of kidney   • Obesity   • Obstructive sleep apnea syndrome   • Seasonal allergic rhinitis   • Vitamin B12 deficiency   • Vitamin D deficiency   • Submucous leiomyoma of uterus     Past Medical History:   Diagnosis Date   • Abnormal Pap smear of cervix    • Cancer     PARTIAL NEPHRECTOMY SURGICAL INTERVENTION ONLY   • Cervical dysplasia    • Hyperlipidemia    • Migraine    • Pain in joint, ankle and foot 2021   • Primary localized osteoarthrosis of ankle and foot 2022   • Psoriasis (a type of skin inflammation)    • Rotator cuff arthropathy of right shoulder 2022   • Submucous leiomyoma of uterus 2022     Past Surgical History:   Procedure Laterality Date   • ANKLE OPEN REDUCTION INTERNAL FIXATION Right    • D & C HYSTEROSCOPY N/A 8/15/2022    Procedure: DILATATION AND CURETTAGE HYSTEROSCOPY;  Surgeon: Chavez Damon MD;  Location: [facility];  Service: Obstetrics/Gynecology;  Laterality: N/A;   • NEPHRECTOMY PARTIAL Right 2021    Kidney Cancer      General Information     Row Name 23 1231          Physical Therapy Time and Intention    Document Type evaluation  -LR     Mode of Treatment individual therapy  -LR     Row Name 23 1231          General Information    Prior Level of Function independent:  -LR           User Key  (r) = Recorded By, (t) = Taken By, (c) = Cosigned By    Initials Name Provider Type    LR Marissa Rodriguez, PT Physical Therapist              History: Pt reports she started having  pain in her R ankle and leg on Friday.  She reports a history of R ankle ORIF about 7 years ago.  She states the pain is on the inside of her ankle, goes up the inside of her leg, and at times has gone into the front of her leg and low back.  She states she is limping when walking.  She reports on Thursday at the gym she was walking on the treadmill with a big incline.  She reports she wears heels to work usually.  Pain is a 10/10.  She reports numbness on the medial ankle that has been there since her surgery.  She can't sleep due to the pain.  She has taken Motrin for the pain.     Objective:    Palpation: Tender to palpation at R medial ankle and medial lower leg; no tenderness in R hip or lumbar region    ROM:  Active/Passive Ankle ROM: WNL on R ankle    Strength:  L Ankle MMT:   R Ankle MMT:  DF: NT/5   DF: 3+/5  PF: Nt/5   PF: 3+/5  Inversion: NT/5  Inversion: 3+/5  Eversion: NT/5  Eversion: 3+/5     Special Tests:  Talar Tilt Test: negative on R  Garibay Test: NT  Anterior Drawer Test: negative on R  Ubaldo's Sign: NT     Sensation: R LE sensation intact    Assessment/Plan:   Pt presents with a diagnosis of R ankle pain and has ankle weakness and tenderness over medial ankle that are limiting her ability to walk and stand.  The patient was placed in a walking boot and fitted for B axillary crutches.  She was educated on ankle 4 way and toe scrunches to strengthen ankle.  She was provided with a HEP handout and green resistance band.  She was also educated on STM to leg and using ice.    Goals:   LTG 1: The patient will be independent in HEP in order to decrease pain and improve tolerance to functional activities.  STATUS: Met    Interventions:   Manual Therapy: not performed    Therapeutic Exercises: HEP: ankle 4 way with green band, toe scrunches on towel    Modalities: not performed    Gait Training: fitted for walking boot on R foot; fitted for B axillary crutches   Outcome Measures     Row Name 04/03/23  1232          Optimal Instrument    Optimal Instrument Optimal - 3  -LR     Standing 2  -LR     Walking - short distance 2  -LR     Walking - long distance 4  -LR     From the list, choose the 3 activities you would most like to be able to do without any difficulty Walking -short distance;Walking -long distance;Standing  -LR     Total Score Optimal - 3 8  -LR     Row Name 04/03/23 1232          Functional Assessment    Outcome Measure Options Optimal Instrument  -LR           User Key  (r) = Recorded By, (t) = Taken By, (c) = Cosigned By    Initials Name Provider Type    LR Marissa Rodriguez, PT Physical Therapist                 04/03/23 1232   PT Evaluation Complexity   History, PT Evaluation Complexity 1-2 personal factors and/or comorbidities   Examination of Body Systems (PT Eval Complexity) 1-2 elements   Clinical Presentation (PT Evaluation Complexity) stable   Clinical Decision Making (PT Evaluation Complexity) low complexity   Overall Complexity (PT Evaluation Complexity) low complexity      Time Calculation:    PT Charges     Row Name 04/03/23 1232             Time Calculation    PT Received On 04/03/23  -LR         Time Calculation- PT    Total Timed Code Minutes- PT 29 minute(s)  -LR         Timed Charges    86756 - PT Therapeutic Exercise Minutes 10  -LR      01353 - Gait Training Minutes  3  -LR         Untimed Charges    PT Eval/Re-eval Minutes 16  -LR         Total Minutes    Timed Charges Total Minutes 13  -LR      Untimed Charges Total Minutes 16  -LR       Total Minutes 29  -LR            User Key  (r) = Recorded By, (t) = Taken By, (c) = Cosigned By    Initials Name Provider Type    LR Marissa Rodriguez, PT Physical Therapist              Therapy Charges for Today     Code Description Service Date Service Provider Modifiers Qty    74751458503 HC PT THER PROC EA 15 MIN 4/3/2023 Marissa Rodriguez, PT GP 1    86270380018 HC PT EVAL LOW COMPLEXITY 2 4/3/2023 Marissa Rodriguez, PT GP 1          PT  G-Codes  Outcome Measure Options: Optimal Instrument       Marissa Rodriguez, PT  4/3/2023

## 2023-04-03 NOTE — Clinical Note
[facility] EMERGENCY ROOM  [address]  [place] [address]  Phone: [phone]    Geri Bowie was seen and treated in our emergency department on 4/3/2023.  She may return to work on 04/05/2023.         Thank you for choosing [facility].    Luz Weems, APRN

## 2023-04-03 NOTE — ED PROVIDER NOTES
Time: 11:40 AM EDT  Date of encounter:  4/3/2023  Independent Historian/Clinical History and Information was obtained by:   Patient  Chief Complaint: right ankle pain    History is limited by: N/A    History of Present Illness:  Patient is a 48 y.o. year old female who presents to the emergency department for evaluation of R ankle pain x1 week. Pt states her pain was worse over the weekend. Her pain radiates to her thigh and back. Pt tried massaging the area. She is not wearing a supportive brace. Pt has hx of ankle surgery (years ago), her orthopedic surgeon left the area. Pt has difficulty bearing weight due to pain. She is allergic to amoxicillin and has partial nephrectomy due to kidney cancer. She is taking ibuprofen for pain relief, last dose 0500 this morning.       History provided by:  Patient   used: No        Patient Care Team  Primary Care Provider: Nicole Tapia MD    Past Medical History:     Allergies   Allergen Reactions   • Amoxicillin Anaphylaxis     Past Medical History:   Diagnosis Date   • Abnormal Pap smear of cervix    • Cancer     PARTIAL NEPHRECTOMY SURGICAL INTERVENTION ONLY   • Cervical dysplasia    • Hyperlipidemia    • Migraine    • Pain in joint, ankle and foot 09/27/2021   • Primary localized osteoarthrosis of ankle and foot 04/07/2022   • Psoriasis (a type of skin inflammation)    • Rotator cuff arthropathy of right shoulder 04/07/2022   • Submucous leiomyoma of uterus 07/21/2022     Past Surgical History:   Procedure Laterality Date   • ANKLE OPEN REDUCTION INTERNAL FIXATION Right 2016   • D & C HYSTEROSCOPY N/A 8/15/2022    Procedure: DILATATION AND CURETTAGE HYSTEROSCOPY;  Surgeon: Chavez Damon MD;  Location: [facility] MAIN OR;  Service: Obstetrics/Gynecology;  Laterality: N/A;   • NEPHRECTOMY PARTIAL Right 03/2021    Kidney Cancer     Family History   Problem Relation Age of Onset   • Malig Hyperthermia Neg Hx        Home Medications:  Prior to Admission  medications    Medication Sig Start Date End Date Taking? Authorizing Provider   cholecalciferol (VITAMIN D3) 1.25 MG (17470 UT) capsule Take 1 capsule by mouth 1 (One) Time Per Week. TAKES ON MONDAY    Hollis Gonzalez MD   fluticasone (FLONASE) 50 MCG/ACT nasal spray  11/16/22   Hollis Gonzalez MD   rosuvastatin (CRESTOR) 20 MG tablet Take 1 tablet by mouth Daily. 7/30/21   Hollis Gonzalez MD   Tremfya 100 MG/ML solution pen-injector TAKES EVERY 2 MONTH LAST DOSE 8/9/22 5/6/22   Hollis Gonzalez MD   vitamin B-12 (CYANOCOBALAMIN) 100 MCG tablet Take 50 mcg by mouth Daily.    Hollis Gonzalez MD   vitamin D (ERGOCALCIFEROL) 1.25 MG (27210 UT) capsule capsule  11/16/22   Hollis Gonzalez MD        Social History:   Social History     Tobacco Use   • Smoking status: Every Day     Packs/day: 0.50     Years: 30.00     Pack years: 15.00     Types: Cigarettes     Start date: 9/8/1991   • Smokeless tobacco: Never   • Tobacco comments:     LAST 0600 8/15/22   Vaping Use   • Vaping Use: Never used   Substance Use Topics   • Alcohol use: Not Currently   • Drug use: Never         Review of Systems:  Review of Systems   Constitutional: Negative for chills, fatigue and fever.   HENT: Negative for rhinorrhea and sore throat.    Eyes: Negative.    Respiratory: Negative for cough, chest tightness and shortness of breath.    Cardiovascular: Negative for chest pain and palpitations.   Gastrointestinal: Negative for abdominal pain, diarrhea, nausea and vomiting.   Endocrine: Negative.    Genitourinary: Negative for decreased urine volume, difficulty urinating, flank pain, frequency, hematuria and urgency.   Musculoskeletal: Positive for arthralgias (R ankle). Negative for myalgias.   Skin: Negative for color change, rash and wound.   Allergic/Immunologic: Negative.    Neurological: Negative for dizziness, syncope, weakness, light-headedness and headaches.   Hematological: Negative.   "  Psychiatric/Behavioral: Negative for agitation and confusion. The patient is not nervous/anxious.         Physical Exam:  /82 (BP Location: Left arm)   Pulse 82   Temp 98.5 °F (36.9 °C) (Oral)   Resp 20   Ht 162.6 cm (64\")   Wt 98 kg (216 lb 0.8 oz)   SpO2 100%   BMI 37.09 kg/m²     Physical Exam  Vitals and nursing note reviewed.   Constitutional:       General: She is not in acute distress.     Appearance: Normal appearance. She is not ill-appearing.   HENT:      Head: Normocephalic and atraumatic.      Nose: Nose normal.   Eyes:      Extraocular Movements: Extraocular movements intact.      Pupils: Pupils are equal, round, and reactive to light.   Cardiovascular:      Rate and Rhythm: Normal rate and regular rhythm.      Pulses: Normal pulses.           Dorsalis pedis pulses are 2+ on the right side and 2+ on the left side.        Posterior tibial pulses are 2+ on the right side and 2+ on the left side.      Heart sounds: Normal heart sounds. No murmur heard.    No gallop.   Pulmonary:      Effort: Pulmonary effort is normal. No respiratory distress.      Breath sounds: Normal breath sounds. No wheezing, rhonchi or rales.   Chest:      Chest wall: No tenderness.   Abdominal:      General: Bowel sounds are normal. There is no distension.      Palpations: Abdomen is soft.      Tenderness: There is no abdominal tenderness.   Musculoskeletal:         General: Normal range of motion.      Cervical back: Normal range of motion and neck supple.      Right ankle: Swelling (mild soft tissue) present. No deformity. Tenderness (just above medial aspect of ankle) present. Normal pulse.      Comments: R ankle: old surgical scar extending form medial ankle to midfoot. No obvious injury, neurovascularly intact   Skin:     General: Skin is warm and dry.      Findings: No erythema or rash.   Neurological:      Mental Status: She is alert and oriented to person, place, and time.      Motor: No weakness. "   Psychiatric:         Mood and Affect: Mood normal.         Behavior: Behavior normal.                  Procedures:  Procedures      Medical Decision Making:      Comorbidities that affect care:    previous orthopedic surgery    External Notes reviewed:    Previous Clinic Note: Urgent care visit from Feb 14      The following orders were placed and all results were independently analyzed by me:  Orders Placed This Encounter   Procedures   • XR Ankle 3+ View Right   • Crutches (fit & training)   • Obtain & Apply The Following- Lower extremity; Walking boot (RLE)   • PT Plan of Care Cert / Re-Cert       Medications Given in the Emergency Department:  Medications   ketorolac (TORADOL) injection 30 mg (30 mg Intramuscular Given 4/3/23 1212)   dexamethasone sodium phosphate injection 10 mg (10 mg Intramuscular Given 4/3/23 1213)        ED Course:    ED Course as of 04/08/23 1349   Mon Apr 03, 2023   1154 X-ray of the right ankle reveals no acute bony abnormalities.  Previous ORIF to the right tib-fib.  Hardware is intact. [AR]      ED Course User Index  [AR] Luz Weems, APRN       Labs:    Lab Results (last 24 hours)     Procedure Component Value Units Date/Time    POC Creatinine [[id number]] Collected: 04/07/23 1543    Specimen: Blood Updated: 04/07/23 1602     Creatinine 0.80 mg/dL      Comment: Serial Number: [id number]:  [id number]        eGFR 91.0 mL/min/1.73            Imaging:    No Radiology Exams Resulted Within Past 24 Hours      Differential Diagnosis and Discussion:    Extremity Pain: Differential diagnosis includes but is not limited to soft tissue sprain, tendonitis, tendon injury, dislocation, fracture, deep vein thrombosis, arterial insufficiency, osteoarthritis, bursitis, and ligamentous damage.    All X-rays were independently reviewed by me.    MDM  Number of Diagnoses or Management Options  Acute right ankle pain  Pain in right lower leg  Diagnosis management comments: I have explained the  patient´s condition, diagnoses and treatment plan based on the information available to me at this time. I have answered questions and addressed any concerns. The patient has a good  understanding of the patient´s diagnosis, condition, and treatment plan as can be expected at this point. The vital signs have been stable. The patient´s condition is stable and appropriate for discharge from the emergency department.      The patient will pursue further outpatient evaluation with the primary care physician or other designated or consulting physician as outlined in the discharge instructions. They are agreeable to this plan of care and follow-up instructions have been explained in detail. The patient has received these instructions in written format and have expressed an understanding of the discharge instructions. The patient is aware that any significant change in condition or worsening of symptoms should prompt an immediate return to this or the closest emergency department or call to 911.    Patient was seen by physical therapy and evaluated in the ER.  She was shown exercises for home.  She was placed in an orthopedic boot.  She was discharged home with steroids and instructed to take NSAIDs for pain control.  Additionally she was referred to orthopedics and told to follow-up should she have ongoing pain or discomfort.  X-ray was negative for any fractures or acute abnormalities.  Hardware is intact from her previous ORIF.       Amount and/or Complexity of Data Reviewed  Clinical lab tests: ordered and reviewed  Tests in the radiology section of CPT®: reviewed and ordered  Tests in the medicine section of CPT®: ordered and reviewed  Review and summarize past medical records: yes  Independent visualization of images, tracings, or specimens: yes    Risk of Complications, Morbidity, and/or Mortality  Presenting problems: moderate  Diagnostic procedures: moderate  Management options: moderate    Patient  Progress  Patient progress: stable       Patient Care Considerations:    NARCOTICS: I considered prescribing opiate pain medication as an outpatient, however not indicated at this time. Xray negative for fracture or dislocation. patient will be sent with NSAIDS      Consultants/Shared Management Plan:    Shared management with attending physician    Social Determinants of Health:    Patient is independent, reliable, and has access to care.       Disposition and Care Coordination:    Discharged: The patient is suitable and stable for discharge with no need for consideration of observation or admission.    I have explained discharge medications and the need for follow up with the patient/caretakers. This was also printed in the discharge instructions. Patient was discharged with the following medications and follow up:      Medication List      New Prescriptions    methylPREDNISolone 4 MG dose pack  Commonly known as: MEDROL  Take as directed on package instructions.           Where to Get Your Medications      These medications were sent to [id number] - [place], [place] - [address] [facility] AT [facility] OF [address] & [place] - [phone]  - [phone] FX  [address], [place] [address]    Phone: [phone]   · methylPREDNISolone 4 MG dose pack      Nicole Tapia MD  [address]  [place] [address] [phone]    Schedule an appointment as soon as possible for a visit       Jimbo Booker MD  [address] [place] [id number] [phone]    Schedule an appointment as soon as possible for a visit          Final diagnoses:   Acute right ankle pain   Pain in right lower leg        ED Disposition     ED Disposition   Discharge    Condition   Stable    Comment   --             This medical record created using voice recognition software.    Documentation assistance provided by Marina Bolden acting as scribe for VELVET Mulligan. Information recorded by the  scribe was done at my direction and has been verified and validated by me.              Marina Bolden  04/03/23 1142       Marina Bolden  04/03/23 1155       Luz Weems, APRN  04/08/23 [id number]

## 2023-04-03 NOTE — Clinical Note
[facility] EMERGENCY ROOM  [address]  [place] [address]  Phone: [phone]    Geri Bowie was seen and treated in our emergency department on 4/3/2023.  She may return to work on 04/05/2023.         Thank you for choosing [facility].    Luz Weems, APRN

## 2023-04-05 ENCOUNTER — HOSPITAL ENCOUNTER (OUTPATIENT)
Dept: MAMMOGRAPHY | Facility: HOSPITAL | Age: 48
Discharge: HOME | End: 2023-04-05
Admitting: STUDENT IN AN ORGANIZED HEALTH CARE EDUCATION/TRAINING PROGRAM
Payer: OTHER GOVERNMENT

## 2023-04-05 DIAGNOSIS — Z01.419 WELL WOMAN EXAM WITH ROUTINE GYNECOLOGICAL EXAM: ICD-10-CM

## 2023-04-05 PROCEDURE — 77067 SCR MAMMO BI INCL CAD: CPT

## 2023-04-05 PROCEDURE — 77063 BREAST TOMOSYNTHESIS BI: CPT

## 2023-04-07 ENCOUNTER — HOSPITAL ENCOUNTER (OUTPATIENT)
Dept: CT IMAGING | Facility: HOSPITAL | Age: 48
Discharge: HOME | End: 2023-04-07
Admitting: UROLOGY
Payer: OTHER GOVERNMENT

## 2023-04-07 DIAGNOSIS — C64.9 RENAL CELL CARCINOMA, UNSPECIFIED LATERALITY: ICD-10-CM

## 2023-04-07 DIAGNOSIS — N20.0 NEPHROLITHIASIS: ICD-10-CM

## 2023-04-07 LAB
CREAT BLDA-MCNC: 0.8 MG/DL
EGFRCR SERPLBLD CKD-EPI 2021: 91 ML/MIN/1.73

## 2023-04-07 PROCEDURE — 25510000001 IOPAMIDOL PER 1 ML: Performed by: UROLOGY

## 2023-04-07 PROCEDURE — 82565 ASSAY OF CREATININE: CPT

## 2023-04-07 PROCEDURE — 74178 CT ABD&PLV WO CNTR FLWD CNTR: CPT

## 2023-04-07 RX ADMIN — IOPAMIDOL 100 ML: 755 INJECTION, SOLUTION INTRAVENOUS at 15:46

## 2023-04-17 ENCOUNTER — TELEPHONE (OUTPATIENT)
Dept: UROLOGY | Facility: CLINIC | Age: 48
End: 2023-04-17
Payer: OTHER GOVERNMENT

## 2023-04-17 RX ORDER — LORATADINE 10 MG/1
TABLET ORAL
COMMUNITY
Start: 2023-03-15

## 2023-04-17 NOTE — TELEPHONE ENCOUNTER
Spoke to pt and advised that she needs to get her UA, BMP, CBC, and chest xray done before her appt. She said that she will go tomorrow and have done.

## 2023-04-18 ENCOUNTER — LAB (OUTPATIENT)
Dept: LAB | Facility: HOSPITAL | Age: 48
End: 2023-04-18
Payer: OTHER GOVERNMENT

## 2023-04-18 ENCOUNTER — HOSPITAL ENCOUNTER (OUTPATIENT)
Dept: GENERAL RADIOLOGY | Facility: HOSPITAL | Age: 48
Discharge: HOME | End: 2023-04-18
Payer: OTHER GOVERNMENT

## 2023-04-18 DIAGNOSIS — C64.9 RENAL CELL CARCINOMA, UNSPECIFIED LATERALITY: ICD-10-CM

## 2023-04-18 LAB
BACTERIA UR QL AUTO: ABNORMAL /HPF
BASOPHILS # BLD AUTO: 0.04 10*3/MM3 (ref 0–0.2)
BASOPHILS NFR BLD AUTO: 0.4 % (ref 0–1.5)
BILIRUB UR QL STRIP: NEGATIVE
CLARITY UR: CLEAR
COLOR UR: YELLOW
DEPRECATED RDW RBC AUTO: 43.9 FL (ref 37–54)
EOSINOPHIL # BLD AUTO: 0.07 10*3/MM3 (ref 0–0.4)
EOSINOPHIL NFR BLD AUTO: 0.7 % (ref 0.3–6.2)
ERYTHROCYTE [DISTWIDTH] IN BLOOD BY AUTOMATED COUNT: 13 % (ref 12.3–15.4)
GLUCOSE UR STRIP-MCNC: NEGATIVE MG/DL
HCT VFR BLD AUTO: 38.5 % (ref 34–46.6)
HGB BLD-MCNC: 13.3 G/DL (ref 12–15.9)
HGB UR QL STRIP.AUTO: ABNORMAL
HYALINE CASTS UR QL AUTO: ABNORMAL /LPF
IMM GRANULOCYTES # BLD AUTO: 0.02 10*3/MM3 (ref 0–0.05)
IMM GRANULOCYTES NFR BLD AUTO: 0.2 % (ref 0–0.5)
KETONES UR QL STRIP: NEGATIVE
LEUKOCYTE ESTERASE UR QL STRIP.AUTO: NEGATIVE
LYMPHOCYTES # BLD AUTO: 3.99 10*3/MM3 (ref 0.7–3.1)
LYMPHOCYTES NFR BLD AUTO: 41.7 % (ref 19.6–45.3)
MCH RBC QN AUTO: 32.4 PG (ref 26.6–33)
MCHC RBC AUTO-ENTMCNC: 34.5 G/DL (ref 31.5–35.7)
MCV RBC AUTO: 93.7 FL (ref 79–97)
MONOCYTES # BLD AUTO: 0.54 10*3/MM3 (ref 0.1–0.9)
MONOCYTES NFR BLD AUTO: 5.6 % (ref 5–12)
NEUTROPHILS NFR BLD AUTO: 4.91 10*3/MM3 (ref 1.7–7)
NEUTROPHILS NFR BLD AUTO: 51.4 % (ref 42.7–76)
NITRITE UR QL STRIP: NEGATIVE
NRBC BLD AUTO-RTO: 0 /100 WBC (ref 0–0.2)
PH UR STRIP.AUTO: 6.5 [PH] (ref 5–8)
PLATELET # BLD AUTO: 276 10*3/MM3 (ref 140–450)
PMV BLD AUTO: 11.3 FL (ref 6–12)
PROT UR QL STRIP: NEGATIVE
RBC # BLD AUTO: 4.11 10*6/MM3 (ref 3.77–5.28)
RBC # UR STRIP: ABNORMAL /HPF
REF LAB TEST METHOD: ABNORMAL
SP GR UR STRIP: 1.01 (ref 1–1.03)
SQUAMOUS #/AREA URNS HPF: ABNORMAL /HPF
UROBILINOGEN UR QL STRIP: ABNORMAL
WBC # UR STRIP: ABNORMAL /HPF
WBC NRBC COR # BLD: 9.57 10*3/MM3 (ref 3.4–10.8)

## 2023-04-18 PROCEDURE — 36415 COLL VENOUS BLD VENIPUNCTURE: CPT

## 2023-04-18 PROCEDURE — 81001 URINALYSIS AUTO W/SCOPE: CPT

## 2023-04-18 PROCEDURE — 85025 COMPLETE CBC W/AUTO DIFF WBC: CPT

## 2023-04-18 PROCEDURE — 80048 BASIC METABOLIC PNL TOTAL CA: CPT

## 2023-04-18 PROCEDURE — 71046 X-RAY EXAM CHEST 2 VIEWS: CPT

## 2023-04-19 LAB
ANION GAP SERPL CALCULATED.3IONS-SCNC: 11.9 MMOL/L (ref 5–15)
BUN SERPL-MCNC: 8 MG/DL (ref 6–20)
BUN/CREAT SERPL: 12.5 (ref 7–25)
CALCIUM SPEC-SCNC: 9.5 MG/DL (ref 8.6–10.5)
CHLORIDE SERPL-SCNC: 99 MMOL/L (ref 98–107)
CO2 SERPL-SCNC: 26.1 MMOL/L (ref 22–29)
CREAT SERPL-MCNC: 0.64 MG/DL (ref 0.57–1)
EGFRCR SERPLBLD CKD-EPI 2021: 109.2 ML/MIN/1.73
GLUCOSE SERPL-MCNC: 103 MG/DL (ref 65–99)
POTASSIUM SERPL-SCNC: 4 MMOL/L (ref 3.5–5.2)
SODIUM SERPL-SCNC: 137 MMOL/L (ref 136–145)

## 2023-04-20 ENCOUNTER — OFFICE VISIT (OUTPATIENT)
Dept: UROLOGY | Facility: CLINIC | Age: 48
End: 2023-04-20
Payer: OTHER GOVERNMENT

## 2023-04-20 VITALS — BODY MASS INDEX: 34.01 KG/M2 | RESPIRATION RATE: 16 BRPM | HEIGHT: 64 IN | WEIGHT: 199.2 LBS

## 2023-04-20 DIAGNOSIS — C64.9 RENAL CELL CARCINOMA, UNSPECIFIED LATERALITY: Primary | ICD-10-CM

## 2023-04-20 PROCEDURE — 87086 URINE CULTURE/COLONY COUNT: CPT | Performed by: UROLOGY

## 2023-04-20 NOTE — PROGRESS NOTES
"    UROLOGY OFFICE follow-up NOTE    Subjective   HPI  Geri Bowie is a 48 y.o. female. Presents for follow-up renal cell carcinoma and history of nephrolithiasis requiring intervention and right partial robotic nephrectomy, 3/2021.  Surveillance imaging and labs prior to today's visit.    Patient notes some lower back pain, wonders if she has a UTI. Repeat dip performed in office today, was negative for infection.    Patient wonders about her genetic risk of renal cell carcinoma particularly when thinking about her children.  Notes that she was 46 at time of diagnosis.  ________________  Right partial nephrectomy pathology, 3/23/2021: Clear-cell renal cell carcinoma, pT1a, (1.7 cm) margin negative    Chest x-ray 4/18/2023 no acute cardiopulmonary process    CT abdomen pelvis with and without contrast 4/7/2023: No renal or ureteral stone seen.  No hydronephrosis.  Surgical changes anterior pole right kidney stable.    UA, 4/18/2023: Suspicious for infection with 4+ bacteria, WBCs, 3-5 RBCs, 7-12 squamous    CBC 4/18/2023, unremarkable  Creatinine, 4/18/2023: 0.64      Stone analysis, 1/11/2021: 100% calcium oxalate          Review of systems  A review of systems was performed, and positive findings are noted in the HPI.    Objective     Vital Signs:   Resp 16   Ht 162.6 cm (64\")   Wt 90.4 kg (199 lb 3.2 oz)   BMI 34.19 kg/m²       Physical exam  No acute distress, well-nourished  Awake alert and oriented  Mood normal; affect normal    Results  PROCEDURE:  XR CHEST 2 VW     COMPARISON: [facility], CR, XR CHEST 2 VW, 4/04/2022, 16:55.     INDICATIONS:  renal cell carcinoma     FINDINGS:        No consolidations or pleural effusions are observed. The cardiac silhouette is within   normal limits for size. The mediastinal soft tissues are unremarkable. No acute osseous   abnormalities are observed.     IMPRESSION:               No evidence for acute cardiopulmonary process.      GIULIANA JAY MD    "      Electronically Signed and Approved By: GIULIANA JAY MD on 4/18/2023 at 22:58       ______________  PROCEDURE:  CT ABDOMEN PELVIS W WO CONTRAST     COMPARISON:  [facility], CT, ABDOMEN PELVIS W/WO CONTRAST, 2/23/2021, 12:26.  [facility], CT, CT ABDOMEN W WO CONTRAST, 3/30/2022, 18:26.     INDICATIONS:  Renal cell carcinoma surveillance; history of right partial nephrectomy,   nephrolithiasis     TECHNIQUE:    CT images of the abdomen were created without and with non-ionic intravenous contrast   material, and CT images of the pelvis were obtained with intravenous contrast material.       PROTOCOL:     Standard imaging protocol performed                 RADIATION:      DLP: 1394mGy*cm               Automated exposure control was utilized to minimize radiation dose.   CONTRAST:      100cc Isovue 370 I.V.     FINDINGS:          The lung bases are clear.     The liver is of normal size.  Benign-appearing cysts are stable.  The gallbladder is not abnormally   distended.  No pancreatic or adrenal mass is evident.  No vascular abnormality is evident.     No renal or ureteral stones are seen.  Postsurgical changes in the anterior lower pole right kidney   are stable.  There is no evidence of hydronephrosis.  The urinary bladder is not abnormally   distended.     No abdominal, mesenteric, or retroperitoneal adenopathy is seen.     The uterus measures 9 cm in greatest transverse dimension.  The ovaries have an unremarkable   appearance.     Bowel loops are not abnormally dilated.     Degenerative changes are seen in the lower thoracic and lumbar spine.     CONTINUED ON NEXT PAGE...           IMPRESSION:                 CT scan of the abdomen and pelvis without and with IV contrast demonstrating stable postsurgical   changes in the anterior lower pole right kidney.              DRAKE CHÁVEZ MD         Electronically Signed and Approved By: DRAKE CHÁVEZ MD on 4/09/2023 at 9:38              Problem List:  Patient Active Problem List   Diagnosis   • Breast lump   • Hematochezia   • High cholesterol   • Pain of breast   • Psoriasis   • Abnormal glucose level   • Dysmenorrhea   • Malignant tumor of kidney   • Obesity   • Obstructive sleep apnea syndrome   • Seasonal allergic rhinitis   • Vitamin B12 deficiency   • Vitamin D deficiency   • Submucous leiomyoma of uterus       Assessment & Plan   Diagnoses and all orders for this visit:    1. Renal cell carcinoma, unspecified laterality (Primary)  -     Urinalysis With Microscopic - Urine, Clean Catch; Future  -     Basic Metabolic Panel; Future  -     CBC & Differential; Future  -     XR Chest 2 View; Future  -     Urine Culture - Urine, Urine, Clean Catch; Future  -     Urine Culture - Urine, Urine, Clean Catch  -     Ambulatory Referral to Hematology / Oncology      Provided reassurance; urine dip negative for suspicion of infection.  No indication for culture.  Encouraged ample hydration.    pT1a renal cell carcinoma -balance Labs and imaging reviewed, no evidence of disease.    Plan for surveillance studies again in 1 year per AUA and NCCN guidelines; plan for annually at least 3 years possibly up to 5    Reviewed American urologic Association guidelines guarding genetic testing for patients diagnosed with RCC less than or equal to 46 years of age.  Place oncology referral so that genetic testing may be obtained.     Follow-up 1 year studies prior or earlier as needed  All question addressed

## 2023-04-21 ENCOUNTER — TELEPHONE (OUTPATIENT)
Dept: UROLOGY | Facility: CLINIC | Age: 48
End: 2023-04-21
Payer: OTHER GOVERNMENT

## 2023-04-21 ENCOUNTER — HOSPITAL ENCOUNTER (OUTPATIENT)
Dept: ULTRASOUND IMAGING | Facility: HOSPITAL | Age: 48
Discharge: HOME | End: 2023-04-21
Payer: OTHER GOVERNMENT

## 2023-04-21 DIAGNOSIS — Z01.419 WELL WOMAN EXAM WITH ROUTINE GYNECOLOGICAL EXAM: ICD-10-CM

## 2023-04-21 PROCEDURE — 76830 TRANSVAGINAL US NON-OB: CPT

## 2023-04-21 NOTE — TELEPHONE ENCOUNTER
LADONNA CALLED FROM  [facility].  THEY RECEIVED THE REFERRAL FROM DR. CULLEN.  PATIENT HAS  PRIME AND HAS TO HAVE A  AUTHORIZATION BEFORE IT CAN BE SCHEDULED.    PLEASE REFER TO THE NOTES IN THE REFERRAL, AND CALL AND LET THEM KNOW WHEN REFERRAL IS IN.  MAY SPEAK TO ANYONE THERE.

## 2023-04-21 NOTE — TELEPHONE ENCOUNTER
When talking to patient to let her know that her urine culture came back with no growth as well as needing a  prime auth.

## 2023-04-22 LAB — BACTERIA SPEC AEROBE CULT: NO GROWTH

## 2023-04-24 NOTE — TELEPHONE ENCOUNTER
DID CALL PATIENT ABOUT NEEDING A  PRIME REFERRAL AND SHE STATED TO SEND THE ORDER TO DR OBREGON FAXED ORDER TO PCP FOR A REFERRAL.

## 2023-05-11 ENCOUNTER — OFFICE VISIT (OUTPATIENT)
Dept: OBSTETRICS AND GYNECOLOGY | Facility: CLINIC | Age: 48
End: 2023-05-11
Payer: OTHER GOVERNMENT

## 2023-05-11 VITALS
HEART RATE: 78 BPM | WEIGHT: 201 LBS | HEIGHT: 64 IN | SYSTOLIC BLOOD PRESSURE: 135 MMHG | BODY MASS INDEX: 34.31 KG/M2 | DIASTOLIC BLOOD PRESSURE: 81 MMHG

## 2023-05-11 DIAGNOSIS — N94.6 DYSMENORRHEA: Primary | ICD-10-CM

## 2023-05-11 DIAGNOSIS — N92.0 MENORRHAGIA WITH REGULAR CYCLE: ICD-10-CM

## 2023-05-11 PROCEDURE — 99212 OFFICE O/P EST SF 10 MIN: CPT | Performed by: STUDENT IN AN ORGANIZED HEALTH CARE EDUCATION/TRAINING PROGRAM

## 2023-05-11 NOTE — PROGRESS NOTES
"GYN Problem Visit - follow up U/S     Chief Complaint   Patient presents with   • Follow-up     Ultrasound               HPI  Geri Bowie is a 48 y.o. female, , who presents for follow up for menorrhagia. LMP: 2023 very light. Reports mense was heavier in April. Still having painful cramping with periods. She took some Motrin this past time and this was helpful.  She is not currently wanting to do any type of surgical management at this point time.  Did have IUD 2 years ago however this migrated to the wrong place and cause issues with more bleeding.  She is hopeful that her bleeding will improve as she is approaching menopause.    Additional OB/GYN History   Patient's last menstrual period was 2023.  Current contraception: contraceptive methods: Vasectomy   Desires to: continue contraception  Allergies : Amoxicillin     The additional following portions of the patient's history were reviewed and updated as appropriate: allergies, current medications, past family history, past medical history, past social history, past surgical history and problem list.    Review of Systems   Constitutional: Negative for fatigue and fever.   Respiratory: Negative for cough and shortness of breath.    Cardiovascular: Negative for chest pain.   Gastrointestinal: Negative for abdominal distention and abdominal pain.   Genitourinary: Positive for menstrual problem. Negative for difficulty urinating, dysuria, vaginal bleeding, vaginal discharge and vaginal pain.       I have reviewed and agree with the HPI, ROS, and historical information as entered above. Chavez Damon MD    Objective   /81   Pulse 78   Ht 162.6 cm (64\")   Wt 91.2 kg (201 lb)   LMP 2023   Breastfeeding No   BMI 34.50 kg/m²     Physical Exam  Vitals and nursing note reviewed.   Constitutional:       General: She is not in acute distress.     Appearance: Normal appearance. She is obese. She is not toxic-appearing.   HENT:      " Head: Normocephalic and atraumatic.   Eyes:      Extraocular Movements: Extraocular movements intact.      Conjunctiva/sclera: Conjunctivae normal.   Cardiovascular:      Pulses: Normal pulses.   Pulmonary:      Effort: Pulmonary effort is normal.   Abdominal:      General: There is no distension.      Palpations: Abdomen is soft.      Tenderness: There is no abdominal tenderness.   Genitourinary:     Comments: Deferred  Musculoskeletal:      Cervical back: Normal range of motion.   Skin:     General: Skin is warm and dry.   Neurological:      Mental Status: She is alert and oriented to person, place, and time.   Psychiatric:         Mood and Affect: Mood normal.         Behavior: Behavior normal.         Thought Content: Thought content normal.     FINDINGS:          The uterus is anteverted and measures 10.9 x 6.3 x 7.4 cm. No myometrial abnormalities are seen.   The endometrial stripe measures approximately 9 mm and is within normal limits.     The right ovary measures 4.0 x 2.4 x 2.0 cm. No focal right ovarian abnormality is seen. The left   ovary measures 3.2 x 3.0 x 1.4 cm.  A partially exophytic cyst is noted associated with the left   ovary measuring 1.7 cm likely related to a physiologic cyst.  Flow is observed within the bilateral   ovaries on Doppler evaluation.     No significant free fluid is observed in the pelvis. No abnormal adnexal masses are identified.     IMPRESSION:                 1. Unremarkable pelvic ultrasound.         Assessment and Plan  Geri Bowie is a 48 y.o.  presenting for follow-up for menorrhagia with regular cycle and dysmenorrhea.  Patient reports improvement in the most recent cycle.  Patient had the opportunity to look at information regarding IUD, sterilization and ablation versus hysterectomy.  At this point time patient would like to do conservative management and monitor for improvement and menstrual cycle.  Reviewed transvaginal ultrasound which was normal.   Most recent CBC does not demonstrate any signs of anemia.    Diagnoses and all orders for this visit:    1. Dysmenorrhea (Primary)    2. Menorrhagia with regular cycle        Counseling:  • Bleeding precautions      She understands the importance of having the above orders performed in a timely fashion.  The risks of not performing them include, but are not limited to, cancer and/or subsequent increase in morbidity and/or mortality.  She is encouraged to review her results online and/or contact or office if she has questions.     Follow Up:  Return in about 1 year (around 5/11/2024), or if symptoms worsen or fail to improve, for Annual physical.      Chavez Damon MD  05/11/2023

## 2023-07-23 ENCOUNTER — HOSPITAL ENCOUNTER (EMERGENCY)
Facility: HOSPITAL | Age: 48
Discharge: HOME | End: 2023-07-23
Attending: EMERGENCY MEDICINE | Admitting: EMERGENCY MEDICINE
Payer: OTHER GOVERNMENT

## 2023-07-23 VITALS
RESPIRATION RATE: 18 BRPM | HEIGHT: 66 IN | WEIGHT: 186.07 LBS | TEMPERATURE: 98.5 F | DIASTOLIC BLOOD PRESSURE: 71 MMHG | SYSTOLIC BLOOD PRESSURE: 119 MMHG | OXYGEN SATURATION: 97 % | HEART RATE: 58 BPM | BODY MASS INDEX: 29.9 KG/M2

## 2023-07-23 DIAGNOSIS — N61.1 ABSCESS OF BREAST, RIGHT: Primary | ICD-10-CM

## 2023-07-23 DIAGNOSIS — R51.9 ACUTE INTRACTABLE HEADACHE, UNSPECIFIED HEADACHE TYPE: ICD-10-CM

## 2023-07-23 LAB
ANION GAP SERPL CALCULATED.3IONS-SCNC: 10.6 MMOL/L (ref 5–15)
BASOPHILS # BLD AUTO: 0.03 10*3/MM3 (ref 0–0.2)
BASOPHILS NFR BLD AUTO: 0.3 % (ref 0–1.5)
BUN SERPL-MCNC: 6 MG/DL (ref 6–20)
BUN/CREAT SERPL: 8.2 (ref 7–25)
CALCIUM SPEC-SCNC: 8.9 MG/DL (ref 8.6–10.5)
CHLORIDE SERPL-SCNC: 101 MMOL/L (ref 98–107)
CO2 SERPL-SCNC: 24.4 MMOL/L (ref 22–29)
CREAT SERPL-MCNC: 0.73 MG/DL (ref 0.57–1)
DEPRECATED RDW RBC AUTO: 47 FL (ref 37–54)
EGFRCR SERPLBLD CKD-EPI 2021: 101.6 ML/MIN/1.73
EOSINOPHIL # BLD AUTO: 0.02 10*3/MM3 (ref 0–0.4)
EOSINOPHIL NFR BLD AUTO: 0.2 % (ref 0.3–6.2)
ERYTHROCYTE [DISTWIDTH] IN BLOOD BY AUTOMATED COUNT: 13.7 % (ref 12.3–15.4)
GLUCOSE SERPL-MCNC: 91 MG/DL (ref 65–99)
HCT VFR BLD AUTO: 39.2 % (ref 34–46.6)
HGB BLD-MCNC: 13.5 G/DL (ref 12–15.9)
IMM GRANULOCYTES # BLD AUTO: 0.02 10*3/MM3 (ref 0–0.05)
IMM GRANULOCYTES NFR BLD AUTO: 0.2 % (ref 0–0.5)
LYMPHOCYTES # BLD AUTO: 3.26 10*3/MM3 (ref 0.7–3.1)
LYMPHOCYTES NFR BLD AUTO: 36.7 % (ref 19.6–45.3)
MCH RBC QN AUTO: 32.4 PG (ref 26.6–33)
MCHC RBC AUTO-ENTMCNC: 34.4 G/DL (ref 31.5–35.7)
MCV RBC AUTO: 94 FL (ref 79–97)
MONOCYTES # BLD AUTO: 0.5 10*3/MM3 (ref 0.1–0.9)
MONOCYTES NFR BLD AUTO: 5.6 % (ref 5–12)
NEUTROPHILS NFR BLD AUTO: 5.06 10*3/MM3 (ref 1.7–7)
NEUTROPHILS NFR BLD AUTO: 57 % (ref 42.7–76)
NRBC BLD AUTO-RTO: 0 /100 WBC (ref 0–0.2)
PLATELET # BLD AUTO: 212 10*3/MM3 (ref 140–450)
PMV BLD AUTO: 10.3 FL (ref 6–12)
POTASSIUM SERPL-SCNC: 4.1 MMOL/L (ref 3.5–5.2)
RBC # BLD AUTO: 4.17 10*6/MM3 (ref 3.77–5.28)
SODIUM SERPL-SCNC: 136 MMOL/L (ref 136–145)
WBC NRBC COR # BLD: 8.89 10*3/MM3 (ref 3.4–10.8)

## 2023-07-23 PROCEDURE — 80048 BASIC METABOLIC PNL TOTAL CA: CPT

## 2023-07-23 PROCEDURE — 99283 EMERGENCY DEPT VISIT LOW MDM: CPT

## 2023-07-23 PROCEDURE — 96374 THER/PROPH/DIAG INJ IV PUSH: CPT

## 2023-07-23 PROCEDURE — 25010000002 KETOROLAC TROMETHAMINE PER 15 MG: Performed by: BEHAVIOR TECHNICIAN

## 2023-07-23 PROCEDURE — 36415 COLL VENOUS BLD VENIPUNCTURE: CPT

## 2023-07-23 PROCEDURE — 63710000001 ONDANSETRON ODT 4 MG TABLET DISPERSIBLE

## 2023-07-23 PROCEDURE — 85025 COMPLETE CBC W/AUTO DIFF WBC: CPT

## 2023-07-23 PROCEDURE — 96375 TX/PRO/DX INJ NEW DRUG ADDON: CPT

## 2023-07-23 PROCEDURE — 25010000002 DIPHENHYDRAMINE PER 50 MG: Performed by: BEHAVIOR TECHNICIAN

## 2023-07-23 PROCEDURE — 25010000002 METOCLOPRAMIDE PER 10 MG: Performed by: BEHAVIOR TECHNICIAN

## 2023-07-23 RX ORDER — KETOROLAC TROMETHAMINE 30 MG/ML
30 INJECTION, SOLUTION INTRAMUSCULAR; INTRAVENOUS ONCE
Status: COMPLETED | OUTPATIENT
Start: 2023-07-23 | End: 2023-07-23

## 2023-07-23 RX ORDER — ONDANSETRON 4 MG/1
4 TABLET, ORALLY DISINTEGRATING ORAL EVERY 8 HOURS PRN
Qty: 15 TABLET | Refills: 0 | Status: SHIPPED | OUTPATIENT
Start: 2023-07-23 | End: 2023-08-02

## 2023-07-23 RX ORDER — METOCLOPRAMIDE HYDROCHLORIDE 5 MG/ML
10 INJECTION INTRAMUSCULAR; INTRAVENOUS ONCE
Status: COMPLETED | OUTPATIENT
Start: 2023-07-23 | End: 2023-07-23

## 2023-07-23 RX ORDER — KETOROLAC TROMETHAMINE 10 MG/1
10 TABLET, FILM COATED ORAL EVERY 6 HOURS PRN
Qty: 15 TABLET | Refills: 0 | Status: SHIPPED | OUTPATIENT
Start: 2023-07-23

## 2023-07-23 RX ORDER — DIPHENHYDRAMINE HYDROCHLORIDE 50 MG/ML
25 INJECTION INTRAMUSCULAR; INTRAVENOUS ONCE
Status: COMPLETED | OUTPATIENT
Start: 2023-07-23 | End: 2023-07-23

## 2023-07-23 RX ORDER — ONDANSETRON 4 MG/1
4 TABLET, ORALLY DISINTEGRATING ORAL ONCE
Status: COMPLETED | OUTPATIENT
Start: 2023-07-23 | End: 2023-07-23

## 2023-07-23 RX ADMIN — ONDANSETRON 4 MG: 4 TABLET, ORALLY DISINTEGRATING ORAL at 16:45

## 2023-07-23 RX ADMIN — SODIUM CHLORIDE 1000 ML: 9 INJECTION, SOLUTION INTRAVENOUS at 19:42

## 2023-07-23 RX ADMIN — KETOROLAC TROMETHAMINE 30 MG: 30 INJECTION, SOLUTION INTRAMUSCULAR; INTRAVENOUS at 19:41

## 2023-07-23 RX ADMIN — METOCLOPRAMIDE HYDROCHLORIDE 10 MG: 5 INJECTION INTRAMUSCULAR; INTRAVENOUS at 19:41

## 2023-07-23 RX ADMIN — DIPHENHYDRAMINE HYDROCHLORIDE 25 MG: 50 INJECTION, SOLUTION INTRAMUSCULAR; INTRAVENOUS at 19:41

## 2023-07-23 NOTE — ED PROVIDER NOTES
Time: 4:22 PM EDT  Date of encounter:  7/23/2023  Independent Historian/Clinical History and Information was obtained by:   Patient    History is limited by: N/A    Chief Complaint   Patient presents with    Nausea    Chills    Cyst         History of Present Illness:  Patient is a 48 y.o. year old female who presents to the emergency department for evaluation of cyst/abscess to medial side right upper breast (cleavage area). Noted 2 weeks ago, went to , needle aspiration performed, and started on bactrim, now nauseated and dizzy. Abscess was originally I&D few years ago by PCP and was then stitched up. (CAMELIA Jeronimo, APRN)    Patient denies fever.  Patient denies vomiting.  Patient states her dizziness is more of her feeling off balance and the room spinning around her.  Patient also expresses a headache.  States that she has a history of headaches and it feels similar.  Patient denies any recent falls or injuries.  Patient denies chest pain or shortness of breath.  She denies cough, sore throat, rhinorrhea, congestion.  Patient denies any sick contacts.  Patient denies neck pain, neck stiffness.    HPI    Patient Care Team  Primary Care Provider: Nicole Tapia MD    Past Medical History:     Allergies   Allergen Reactions    Amoxicillin Anaphylaxis     Past Medical History:   Diagnosis Date    Abnormal Pap smear of cervix     Cancer     PARTIAL NEPHRECTOMY SURGICAL INTERVENTION ONLY    Cervical dysplasia     Hyperlipidemia     Migraine     Pain in joint, ankle and foot 09/27/2021    Primary localized osteoarthrosis of ankle and foot 04/07/2022    Psoriasis (a type of skin inflammation)     Rotator cuff arthropathy of right shoulder 04/07/2022    Submucous leiomyoma of uterus 07/21/2022     Past Surgical History:   Procedure Laterality Date    ANKLE OPEN REDUCTION INTERNAL FIXATION Right 2016    D & C HYSTEROSCOPY N/A 8/15/2022    Procedure: DILATATION AND CURETTAGE HYSTEROSCOPY;  Surgeon: Chavez Damon  MD Varun;  Location: [facility] MAIN OR;  Service: Obstetrics/Gynecology;  Laterality: N/A;    NEPHRECTOMY PARTIAL Right 03/2021    Kidney Cancer     Family History   Problem Relation Age of Onset    Malig Hyperthermia Neg Hx        Home Medications:  Prior to Admission medications    Medication Sig Start Date End Date Taking? Authorizing Provider   fluticasone (FLONASE) 50 MCG/ACT nasal spray  11/16/22  Yes Hollis Gonzalez MD   Guselkumab (Tremfya) 100 MG/ML solution prefilled syringe as directed subcutaneously every 8 weeks   Yes Hollis Gonzalez MD   loratadine (CLARITIN) 10 MG tablet  3/15/23  Yes Hollis Gonzalez MD   rosuvastatin (CRESTOR) 20 MG tablet Take 1 tablet by mouth Daily. 7/30/21  Yes Hollis Gonzalez MD   sulfamethoxazole-trimethoprim (BACTRIM DS,SEPTRA DS) 800-160 MG per tablet Take 1 tablet by mouth 2 (Two) Times a Day. 7/18/23  Yes Kerline Hassan MD   Tremfya 100 MG/ML solution pen-injector TAKES EVERY 2 MONTH LAST DOSE 8/9/22 5/6/22  Yes ProviderHollis MD   vitamin B-12 (CYANOCOBALAMIN) 100 MCG tablet Take 50 mcg by mouth Daily.   Yes Hollis Gonzalez MD   vitamin B-12 (CYANOCOBALAMIN) 1000 MCG tablet  6/21/23  Yes ProviderHollis MD   vitamin D (ERGOCALCIFEROL) 1.25 MG (30442 UT) capsule capsule  11/16/22  Yes Hollis Gonzalez MD   mupirocin (BACTROBAN) 2 % ointment Apply 1 application  topically to the appropriate area as directed 2 (Two) Times a Day. 7/18/23   Kerline Hassan MD        Social History:   Social History     Tobacco Use    Smoking status: Every Day     Packs/day: 0.50     Years: 30.00     Pack years: 15.00     Types: Cigarettes     Start date: 9/8/1991    Smokeless tobacco: Never    Tobacco comments:     LAST 0600 8/15/22   Vaping Use    Vaping Use: Never used   Substance Use Topics    Alcohol use: Not Currently    Drug use: Never         Review of Systems:  Review of Systems   Constitutional:  Negative for chills and fever.   HENT:   "Negative for congestion, rhinorrhea and sore throat.    Respiratory:  Negative for shortness of breath.    Cardiovascular:  Negative for chest pain.   Gastrointestinal:  Positive for nausea. Negative for vomiting.   Musculoskeletal:  Negative for neck pain and neck stiffness.   Skin:  Positive for wound.   Neurological:  Positive for dizziness and headaches. Negative for syncope.      Physical Exam:  /82   Pulse 64   Temp 98.5 °F (36.9 °C) (Oral)   Resp 18   Ht 167.6 cm (66\")   Wt 84.4 kg (186 lb 1.1 oz)   SpO2 94%   BMI 30.03 kg/m²         Physical Exam  Vitals and nursing note reviewed.   Constitutional:       General: She is not in acute distress.     Appearance: Normal appearance. She is not ill-appearing, toxic-appearing or diaphoretic.   HENT:      Head: Normocephalic and atraumatic.      Nose: Nose normal.      Mouth/Throat:      Mouth: Mucous membranes are moist.   Eyes:      Extraocular Movements: Extraocular movements intact.      Pupils: Pupils are equal, round, and reactive to light.   Cardiovascular:      Rate and Rhythm: Normal rate and regular rhythm.      Heart sounds: Normal heart sounds.   Pulmonary:      Effort: Pulmonary effort is normal. No respiratory distress.      Breath sounds: Normal breath sounds. No wheezing.   Chest:      Chest wall: No tenderness.   Abdominal:      General: Abdomen is flat. Bowel sounds are normal. There is no distension.      Palpations: Abdomen is soft.      Tenderness: There is no abdominal tenderness. There is no right CVA tenderness, left CVA tenderness or guarding.   Musculoskeletal:         General: Normal range of motion.      Cervical back: Normal range of motion and neck supple.      Right lower leg: No edema.      Left lower leg: No edema.   Skin:     General: Skin is warm and dry.      Coloration: Skin is not jaundiced or pale.      Findings: No erythema or rash.          Neurological:      General: No focal deficit present.      Mental Status: " She is alert and oriented to person, place, and time.      GCS: GCS eye subscore is 4. GCS verbal subscore is 5. GCS motor subscore is 6.      Cranial Nerves: Cranial nerves 2-12 are intact. No cranial nerve deficit, dysarthria or facial asymmetry.      Motor: No weakness.      Coordination: Finger-Nose-Finger Test normal.   Psychiatric:         Mood and Affect: Mood normal.         Behavior: Behavior normal.      .  CLINICAL PHOTOGRAPHS OTHER (07/23/2023)             Procedures:  Procedures      Medical Decision Making:      Comorbidities that affect care:    History of migraines, psoriasis    External Notes reviewed:    Previous Clinic Note: Patient seen in urgent care in 7/18/2023 for abscess.      The following orders were placed and all results were independently analyzed by me:  Orders Placed This Encounter   Procedures    Basic Metabolic Panel    CBC Auto Differential    CBC & Differential       Medications Given in the Emergency Department:  Medications   ondansetron ODT (ZOFRAN-ODT) disintegrating tablet 4 mg (4 mg Oral Given 7/23/23 1645)   metoclopramide (REGLAN) injection 10 mg (10 mg Intravenous Given 7/23/23 1941)   ketorolac (TORADOL) injection 30 mg (30 mg Intravenous Given 7/23/23 1941)   sodium chloride 0.9 % bolus 1,000 mL (1,000 mL Intravenous New Bag 7/23/23 1942)   diphenhydrAMINE (BENADRYL) injection 25 mg (25 mg Intravenous Given 7/23/23 1941)        ED Course:    The patient was initially evaluated in the triage area where orders were placed. The patient was later dispositioned by Yusra Shabbir, PA.      The patient was advised to stay for completion of workup which includes but is not limited to communication of labs and radiological results, reassessment and plan. The patient was advised that leaving prior to disposition by a provider could result in critical findings that are not communicated to the patient.     ED Course as of 07/23/23 2022   Sun Jul 23, 2023   1627   --- PROVIDER IN  TRIAGE NOTE ---    Patient was seen and evaluated in triage by VELVET Yadav.  Orders were written and the patient is currently awaiting disposition.   [MS]   1847 RDW-SD: 47.0 [RP]      ED Course User Index  [MS] Radha Jeronimo APRN  [RP] Carlito Powers MD       Labs:    Lab Results (last 24 hours)       Procedure Component Value Units Date/Time    CBC & Differential [[id number]]  (Abnormal) Collected: 07/23/23 1645    Specimen: Blood Updated: 07/23/23 1655    Narrative:      The following orders were created for panel order CBC & Differential.  Procedure                               Abnormality         Status                     ---------                               -----------         ------                     CBC Auto Differential[[id number]]        Abnormal            Final result                 Please view results for these tests on the individual orders.    Basic Metabolic Panel [[id number]]  (Normal) Collected: 07/23/23 1645    Specimen: Blood Updated: 07/23/23 1711     Glucose 91 mg/dL      BUN 6 mg/dL      Creatinine 0.73 mg/dL      Sodium 136 mmol/L      Potassium 4.1 mmol/L      Chloride 101 mmol/L      CO2 24.4 mmol/L      Calcium 8.9 mg/dL      BUN/Creatinine Ratio 8.2     Anion Gap 10.6 mmol/L      eGFR 101.6 mL/min/1.73     Narrative:      GFR Normal >60  Chronic Kidney Disease <60  Kidney Failure <15      CBC Auto Differential [[id number]]  (Abnormal) Collected: 07/23/23 1645    Specimen: Blood Updated: 07/23/23 1655     WBC 8.89 10*3/mm3      RBC 4.17 10*6/mm3      Hemoglobin 13.5 g/dL      Hematocrit 39.2 %      MCV 94.0 fL      MCH 32.4 pg      MCHC 34.4 g/dL      RDW 13.7 %      RDW-SD 47.0 fl      MPV 10.3 fL      Platelets 212 10*3/mm3      Neutrophil % 57.0 %      Lymphocyte % 36.7 %      Monocyte % 5.6 %      Eosinophil % 0.2 %      Basophil % 0.3 %      Immature Grans % 0.2 %      Neutrophils, Absolute 5.06 10*3/mm3      Lymphocytes, Absolute 3.26 10*3/mm3       Monocytes, Absolute 0.50 10*3/mm3      Eosinophils, Absolute 0.02 10*3/mm3      Basophils, Absolute 0.03 10*3/mm3      Immature Grans, Absolute 0.02 10*3/mm3      nRBC 0.0 /100 WBC              Imaging:    No Radiology Exams Resulted Within Past 24 Hours      Differential Diagnosis and Discussion:      Abscess: Differential diagnosis for an abscess includes but is not limited to bacterial or fungal infections, foreign body reactions, malignancies, and autoimmune or inflammatory conditions.    All labs were reviewed and interpreted by me.    MDM  Number of Diagnoses or Management Options  Abscess of breast, right  Acute intractable headache, unspecified headache type  Diagnosis management comments: Patient reports emergency department complaining of abscess in her right upper breast/cleavage.  Patient states that abscess was I&D on the 18th and patient was placed on Bactrim and mupirocin topical.  Patient states that the abscess is not gotten worse but has not gotten any better.  Patient denies fever.  Patient admits to headache, dizziness, nausea.  Patient denies fever.  On physical exam patient has a well-healing abscess with no fluctuance surrounding erythema or swelling in her right upper breast cleavage area.  Patient is afebrile.  Patient's vital signs unremarkable.  Patient CBC and CMP unremarkable and specifically white count was not elevated.  Patient was given Toradol, Zofran, Reglan, fluids, Benadryl for headache.  On reevaluation, patient states her symptoms have significant improved.  Told patient to follow-up with PCP for possible dermatology referral.  Patient states understanding agreeable treatment plan.  Patient given strict return precautions.    The patient presented to the emergency department with a headache. The patient is now resting comfortably in feels better, is alert, talkative, interactive and in no distress after ED treatment. The patient appears well and is able to tolerate PO fluids.  Repeat examination is unremarkable and benign. The patient is neurologically intact, has a normal mental status, and this ambulatory in the ED. The history, exam, diagnostic testing (if any) and the patient's current condition do not suggest meningitis, stroke, sepsis, subarachnoid hemorrhage, intracranial bleeding, encephalitis, temporal arteritis or other significant pathology to warrant further testing, continued ED treatment, admission, neurological consultation, for other specialist evaluation at this point. The vital signs have been stable. The patient's condition is stable and appropriate for discharge. The patient will pursue further outpatient evaluation with the primary care physician or other designated or consulting physician as indicated in the discharge instructions.         Amount and/or Complexity of Data Reviewed  Clinical lab tests: reviewed             Patient Care Considerations:    I consider doing I&D however there is no fluctuance noted on physical exam, the abscess is well-healing. ANTIBIOTICS: I considered prescribing antibiotics as an outpatient however patient is already on Bactrim.      Consultants/Shared Management Plan:    I have discussed the case with Dr. Powers who states that the patient can be safely discharged with close follow up.    Social Determinants of Health:    Patient is independent, reliable, and has access to care.       Disposition and Care Coordination:    Discharged: The patient is suitable and stable for discharge with no need for consideration of observation or admission.    I have explained the patient´s condition, diagnoses and treatment plan based on the information available to me at this time. I have answered questions and addressed any concerns. The patient has a good  understanding of the patient´s diagnosis, condition, and treatment plan as can be expected at this point. The vital signs have been stable. The patient´s condition is stable and appropriate  for discharge from the emergency department.      The patient will pursue further outpatient evaluation with the primary care physician or other designated or consulting physician as outlined in the discharge instructions. They are agreeable to this plan of care and follow-up instructions have been explained in detail. The patient has received these instructions in written format and have expressed an understanding of the discharge instructions. The patient is aware that any significant change in condition or worsening of symptoms should prompt an immediate return to this or the closest emergency department or call to 911.  I have explained discharge medications and the need for follow up with the patient/caretakers. This was also printed in the discharge instructions. Patient was discharged with the following medications and follow up:      Medication List        New Prescriptions      ketorolac 10 MG tablet  Commonly known as: TORADOL  Take 1 tablet by mouth Every 6 (Six) Hours As Needed for Moderate Pain.     ondansetron ODT 4 MG disintegrating tablet  Commonly known as: ZOFRAN-ODT  Place 1 tablet on the tongue Every 8 (Eight) Hours As Needed for Vomiting or Nausea for up to 10 days.               Where to Get Your Medications        These medications were sent to [id number] - [place], [place] - [address] AT [facility] OF [address] & [place] - [phone]  - [phone] FX  [address] [address]      Phone: [phone]   ketorolac 10 MG tablet  ondansetron ODT 4 MG disintegrating tablet      Nicole Tapia MD  [address] [address] [phone]    In 3 days         Final diagnoses:   Abscess of breast, right   Acute intractable headache, unspecified headache type        ED Disposition       ED Disposition   Discharge    Condition   Stable    Comment   --               This medical record created using voice recognition software.              Shabbir, Yusra, PA  07/23/23 2022

## 2023-07-23 NOTE — Clinical Note
[facility] EMERGENCY ROOM  [address]  [place] [address]  Phone: [phone]    Geri Bowie was seen and treated in our emergency department on 7/23/2023.  She may return to work on 07/24/2023.         Thank you for choosing [facility].    Shabbir, Yusra, PA

## 2023-07-24 NOTE — DISCHARGE INSTRUCTIONS
Continue taking Bactrim as prescribed at urgent care and complete the full course.  I have prescribed you Toradol, take as needed for pain and headache.  I also prescribed you Zofran, take as prescribed for nausea and vomiting.  Follow-up with your PCP in 3 to 5 days and discuss possible referral to dermatology.  Return to emergency department if you have new or worsening symptoms such as fever, chest pain, shortness of breath.

## 2023-12-07 ENCOUNTER — TELEPHONE (OUTPATIENT)
Dept: GENETICS | Facility: HOSPITAL | Age: 48
End: 2023-12-07
Payer: OTHER GOVERNMENT

## 2024-01-26 ENCOUNTER — TRANSCRIBE ORDERS (OUTPATIENT)
Dept: ADMINISTRATIVE | Facility: HOSPITAL | Age: 49
End: 2024-01-26
Payer: OTHER GOVERNMENT

## 2024-01-26 ENCOUNTER — HOSPITAL ENCOUNTER (OUTPATIENT)
Dept: GENERAL RADIOLOGY | Facility: HOSPITAL | Age: 49
Discharge: HOME | End: 2024-01-26
Admitting: DERMATOLOGY
Payer: OTHER GOVERNMENT

## 2024-01-26 DIAGNOSIS — R76.11 POSITIVE PPD: ICD-10-CM

## 2024-01-26 DIAGNOSIS — L40.9 PSORIASIS: ICD-10-CM

## 2024-01-26 DIAGNOSIS — L40.9 PSORIASIS: Primary | ICD-10-CM

## 2024-01-26 PROCEDURE — 71046 X-RAY EXAM CHEST 2 VIEWS: CPT

## 2024-03-29 ENCOUNTER — APPOINTMENT (OUTPATIENT)
Dept: GENERAL RADIOLOGY | Facility: HOSPITAL | Age: 49
End: 2024-03-29
Payer: OTHER GOVERNMENT

## 2024-03-29 ENCOUNTER — HOSPITAL ENCOUNTER (EMERGENCY)
Facility: HOSPITAL | Age: 49
Discharge: HOME | End: 2024-03-29
Attending: EMERGENCY MEDICINE
Payer: OTHER GOVERNMENT

## 2024-03-29 VITALS
RESPIRATION RATE: 16 BRPM | WEIGHT: 187.39 LBS | HEIGHT: 66 IN | TEMPERATURE: 98.2 F | HEART RATE: 86 BPM | DIASTOLIC BLOOD PRESSURE: 77 MMHG | OXYGEN SATURATION: 100 % | SYSTOLIC BLOOD PRESSURE: 141 MMHG | BODY MASS INDEX: 30.12 KG/M2

## 2024-03-29 DIAGNOSIS — S30.0XXA CONTUSION OF SACRUM, INITIAL ENCOUNTER: Primary | ICD-10-CM

## 2024-03-29 PROCEDURE — 72220 X-RAY EXAM SACRUM TAILBONE: CPT

## 2024-03-29 PROCEDURE — 25010000002 KETOROLAC TROMETHAMINE PER 15 MG

## 2024-03-29 PROCEDURE — 99283 EMERGENCY DEPT VISIT LOW MDM: CPT

## 2024-03-29 PROCEDURE — 96372 THER/PROPH/DIAG INJ SC/IM: CPT

## 2024-03-29 RX ORDER — KETOROLAC TROMETHAMINE 30 MG/ML
30 INJECTION, SOLUTION INTRAMUSCULAR; INTRAVENOUS ONCE
Status: COMPLETED | OUTPATIENT
Start: 2024-03-29 | End: 2024-03-29

## 2024-03-29 RX ADMIN — KETOROLAC TROMETHAMINE 30 MG: 30 INJECTION, SOLUTION INTRAMUSCULAR; INTRAVENOUS at 17:38

## 2024-03-29 NOTE — ED PROVIDER NOTES
Time: 5:23 PM EDT  Date of encounter:  3/29/2024  Independent Historian/Clinical History and Information was obtained by:   Patient    History is limited by: N/A    Chief Complaint: tailbone pain      History of Present Illness:  Patient is a 49 y.o. year old female who presents to the emergency department for evaluation of tailbone pain that began last week after falling out of a chair.  Patient states she has had consistent tailbone discomfort since then.  Patient states she is able to ambulate with no complication.  Patient denies urinary/bowel incontinence as well as saddle anesthesia.    HPI    Patient Care Team  Primary Care Provider: Nicole Tapia MD    Past Medical History:     Allergies   Allergen Reactions    Amoxicillin Anaphylaxis     Past Medical History:   Diagnosis Date    Abnormal Pap smear of cervix     Cancer     PARTIAL NEPHRECTOMY SURGICAL INTERVENTION ONLY    Cervical dysplasia     Hyperlipidemia     Migraine     Pain in joint, ankle and foot 09/27/2021    Primary localized osteoarthrosis of ankle and foot 04/07/2022    Psoriasis (a type of skin inflammation)     Rotator cuff arthropathy of right shoulder 04/07/2022    Submucous leiomyoma of uterus 07/21/2022     Past Surgical History:   Procedure Laterality Date    ANKLE OPEN REDUCTION INTERNAL FIXATION Right 2016    D & C HYSTEROSCOPY N/A 8/15/2022    Procedure: DILATATION AND CURETTAGE HYSTEROSCOPY;  Surgeon: Chavez Damon MD;  Location: [facility] MAIN OR;  Service: Obstetrics/Gynecology;  Laterality: N/A;    NEPHRECTOMY PARTIAL Right 03/2021    Kidney Cancer     Family History   Problem Relation Age of Onset    Malig Hyperthermia Neg Hx        Home Medications:  Prior to Admission medications    Medication Sig Start Date End Date Taking? Authorizing Provider   Guselkumab (Tremfya) 100 MG/ML solution prefilled syringe as directed subcutaneously every 8 weeks    Provider, MD Hollis   rosuvastatin (CRESTOR) 20 MG tablet Take 1 tablet  by mouth Daily. 7/30/21   Hollis Gonzalez MD   Tremfya 100 MG/ML solution pen-injector TAKES EVERY 2 MONTH LAST DOSE 8/9/22 5/6/22   Hollis Gonzalez MD   vitamin B-12 (CYANOCOBALAMIN) 100 MCG tablet Take 50 mcg by mouth Daily.    Hollis Gonzalez MD   vitamin B-12 (CYANOCOBALAMIN) 1000 MCG tablet  6/21/23   Hollis Gonzalez MD   vitamin D (ERGOCALCIFEROL) 1.25 MG (05133 UT) capsule capsule  11/16/22   Hollis Gonzalez MD   fluticasone (FLONASE) 50 MCG/ACT nasal spray  11/16/22 3/29/24  Hollis Gonzalez MD   ketorolac (TORADOL) 10 MG tablet Take 1 tablet by mouth Every 6 (Six) Hours As Needed for Moderate Pain. 7/23/23 3/29/24  Shabbir, Yusra, PA   loratadine (CLARITIN) 10 MG tablet  3/15/23 3/29/24  Hollis Gonzalez MD   mupirocin (BACTROBAN) 2 % ointment Apply 1 application  topically to the appropriate area as directed 2 (Two) Times a Day. 7/18/23 3/29/24  Kerline Hassan MD   sulfamethoxazole-trimethoprim (BACTRIM DS,SEPTRA DS) 800-160 MG per tablet Take 1 tablet by mouth 2 (Two) Times a Day. 7/18/23 3/29/24  Kerline Hassan MD        Social History:   Social History     Tobacco Use    Smoking status: Every Day     Current packs/day: 0.50     Average packs/day: 0.5 packs/day for 32.6 years (16.3 ttl pk-yrs)     Types: Cigarettes     Start date: 9/8/1991    Smokeless tobacco: Never    Tobacco comments:     LAST 0600 8/15/22   Vaping Use    Vaping status: Never Used   Substance Use Topics    Alcohol use: Not Currently    Drug use: Never         Review of Systems:  Review of Systems   Constitutional:  Negative for chills and fever.   HENT:  Negative for congestion, rhinorrhea and sore throat.    Eyes:  Negative for pain and visual disturbance.   Respiratory:  Negative for apnea, cough, chest tightness and shortness of breath.    Cardiovascular:  Negative for chest pain and palpitations.   Gastrointestinal:  Negative for abdominal pain, diarrhea, nausea and vomiting.  "  Genitourinary:  Negative for difficulty urinating and dysuria.   Musculoskeletal:  Positive for back pain. Negative for joint swelling and myalgias.   Skin:  Negative for color change.   Neurological:  Negative for seizures and headaches.   Psychiatric/Behavioral: Negative.     All other systems reviewed and are negative.       Physical Exam:  /77 (BP Location: Right arm, Patient Position: Sitting)   Pulse 86   Temp 98.2 °F (36.8 °C) (Oral)   Resp 16   Ht 167.6 cm (66\")   Wt 85 kg (187 lb 6.3 oz)   SpO2 100%   BMI 30.25 kg/m²     Physical Exam  Vitals and nursing note reviewed.   Constitutional:       General: She is not in acute distress.     Appearance: Normal appearance. She is not toxic-appearing.   HENT:      Head: Normocephalic and atraumatic.      Jaw: There is normal jaw occlusion.   Eyes:      General: Lids are normal.      Extraocular Movements: Extraocular movements intact.      Conjunctiva/sclera: Conjunctivae normal.      Pupils: Pupils are equal, round, and reactive to light.   Cardiovascular:      Rate and Rhythm: Normal rate and regular rhythm.      Pulses: Normal pulses.      Heart sounds: Normal heart sounds.   Pulmonary:      Effort: Pulmonary effort is normal. No respiratory distress.      Breath sounds: Normal breath sounds. No wheezing or rhonchi.   Abdominal:      General: Abdomen is flat.      Palpations: Abdomen is soft.      Tenderness: There is no abdominal tenderness. There is no guarding or rebound.   Musculoskeletal:         General: Tenderness (Mild tenderness appreciated upon palpation to sacrum/coccyx) present. Normal range of motion.      Cervical back: Normal range of motion and neck supple.      Right lower leg: No edema.      Left lower leg: No edema.   Skin:     General: Skin is warm and dry.   Neurological:      Mental Status: She is alert and oriented to person, place, and time. Mental status is at baseline.   Psychiatric:         Mood and Affect: Mood normal. "                  Procedures:  Procedures      Medical Decision Making:      Comorbidities that affect care:    Cancer, hyperlipidemia    External Notes reviewed:          The following orders were placed and all results were independently analyzed by me:  Orders Placed This Encounter   Procedures    XR Sacrum & Coccyx       Medications Given in the Emergency Department:  Medications   ketorolac (TORADOL) injection 30 mg (30 mg Intramuscular Given 3/29/24 1747)        ED Course:         Labs:    Lab Results (last 24 hours)       ** No results found for the last 24 hours. **             Imaging:    XR Sacrum & Coccyx    Result Date: 3/29/2024  XR SACRUM AND COCCYX-  Date of Exam: 3/29/2024 5:14 PM  Indication: fall, pain  Comparison: None available.  Findings: The sacroiliac joints are patent. There is no definite fracture involving the sacral or coccygeal area. There is facet arthropathy in the lower lumbar spine.      Impression: 1.  No acute osseous abnormality appreciated on this study. 2.  Facet arthropathy lumbar spine   Electronically Signed By-Dejuan Alonzo MD On:3/29/2024 5:25 PM         Differential Diagnosis and Discussion:    Back Pain: The patient presents with back pain. My differential diagnosis includes but is not limited to acute spinal epidural abscess, acute spinal epidural bleed, cauda equina syndrome, abdominal aortic aneurysm, aortic dissection, kidney stone, pyelonephritis, musculoskeletal back pain, spinal fracture, and osteoarthritis.     All X-rays impressions were independently interpreted by me.    MDM     X-ray sacrum and coccyx showed no acute osseous abnormality.  Patient is resting comfortably at this time.  Instructed patient to follow-up PCP in 1 week.  Instructed patient to return to ED in the meantime she develops any new or worsening symptoms.  Patient states she feels better after receiving Toradol shot.  Patient states she understands and agrees with plan of care.          Patient  Care Considerations:          Consultants/Shared Management Plan:    None    Social Determinants of Health:    Patient is independent, reliable, and has access to care.       Disposition and Care Coordination:    Discharged: The patient is suitable and stable for discharge with no need for consideration of admission.    I have explained the patient´s condition, diagnoses and treatment plan based on the information available to me at this time. I have answered questions and addressed any concerns. The patient has a good  understanding of the patient´s diagnosis, condition, and treatment plan as can be expected at this point. The vital signs have been stable. The patient´s condition is stable and appropriate for discharge from the emergency department.      The patient will pursue further outpatient evaluation with the primary care physician or other designated or consulting physician as outlined in the discharge instructions. They are agreeable to this plan of care and follow-up instructions have been explained in detail. The patient has received these instructions in written format and has expressed an understanding of the discharge instructions. The patient is aware that any significant change in condition or worsening of symptoms should prompt an immediate return to this or the closest emergency department or call to 911.  I have explained discharge medications and the need for follow up with the patient/caretakers. This was also printed in the discharge instructions. Patient was discharged with the following medications and follow up:      Medication List      No changes were made to your prescriptions during this visit.      Nicole Tapia MD  [address] [facility] [address] [phone]    Schedule an appointment as soon as possible for a visit in 1 week  Follow-up       Final diagnoses:   Contusion of sacrum, initial encounter        ED Disposition       ED Disposition   Discharge    Condition    Stable    Comment   --               This medical record created using voice recognition software.             Herson Pate PA-C  03/29/24 1334

## 2024-04-08 ENCOUNTER — TELEPHONE (OUTPATIENT)
Dept: OBSTETRICS AND GYNECOLOGY | Facility: CLINIC | Age: 49
End: 2024-04-08
Payer: OTHER GOVERNMENT

## 2024-04-08 NOTE — TELEPHONE ENCOUNTER
Dr VASQUEZ not in offie after 3pm [facility] to call back to office to r/s the 05 15 2024 appointment.

## 2024-04-09 ENCOUNTER — HOSPITAL ENCOUNTER (OUTPATIENT)
Dept: CT IMAGING | Facility: HOSPITAL | Age: 49
Discharge: HOME | End: 2024-04-09
Admitting: UROLOGY
Payer: OTHER GOVERNMENT

## 2024-04-09 DIAGNOSIS — C64.9 RENAL CELL CARCINOMA, UNSPECIFIED LATERALITY: ICD-10-CM

## 2024-04-09 LAB
CREAT BLDA-MCNC: 0.8 MG/DL (ref 0.6–1.3)
EGFRCR SERPLBLD CKD-EPI 2021: 90.5 ML/MIN/1.73

## 2024-04-09 PROCEDURE — 82565 ASSAY OF CREATININE: CPT

## 2024-04-09 PROCEDURE — 25510000001 IOPAMIDOL PER 1 ML: Performed by: UROLOGY

## 2024-04-09 PROCEDURE — 74170 CT ABD WO CNTRST FLWD CNTRST: CPT

## 2024-04-09 RX ADMIN — IOPAMIDOL 100 ML: 755 INJECTION, SOLUTION INTRAVENOUS at 15:39

## 2024-04-18 ENCOUNTER — TELEPHONE (OUTPATIENT)
Dept: SURGERY | Facility: CLINIC | Age: 49
End: 2024-04-18
Payer: OTHER GOVERNMENT

## 2024-04-18 NOTE — TELEPHONE ENCOUNTER
[facility] REFERRAL REQUIRED FOR APPOINTMENT. PREVIOUS REFERRAL . CALLED PATIENT AND PCP TO REQUEST NEW REFERRAL FOR ANNUAL EXAM.

## 2024-04-19 ENCOUNTER — TRANSCRIBE ORDERS (OUTPATIENT)
Dept: ADMINISTRATIVE | Facility: HOSPITAL | Age: 49
End: 2024-04-19
Payer: OTHER GOVERNMENT

## 2024-04-19 DIAGNOSIS — Z78.0 POST-MENOPAUSAL: Primary | ICD-10-CM

## 2024-04-22 ENCOUNTER — TELEPHONE (OUTPATIENT)
Dept: UROLOGY | Facility: CLINIC | Age: 49
End: 2024-04-22
Payer: OTHER GOVERNMENT

## 2024-04-22 DIAGNOSIS — C64.9 RENAL CELL CARCINOMA, UNSPECIFIED LATERALITY: Primary | ICD-10-CM

## 2024-04-22 NOTE — TELEPHONE ENCOUNTER
Called to inform PT of lab work due for this weeks appointment with Dr. Sam.     PT states she will get completed but does not wish to get xray done due to being exposed to a lot of radiation recently.

## 2024-04-23 ENCOUNTER — HOSPITAL ENCOUNTER (OUTPATIENT)
Dept: GENERAL RADIOLOGY | Facility: HOSPITAL | Age: 49
Discharge: HOME | End: 2024-04-23
Payer: OTHER GOVERNMENT

## 2024-04-23 ENCOUNTER — LAB (OUTPATIENT)
Dept: LAB | Facility: HOSPITAL | Age: 49
End: 2024-04-23
Payer: OTHER GOVERNMENT

## 2024-04-23 DIAGNOSIS — C64.9 RENAL CELL CARCINOMA, UNSPECIFIED LATERALITY: ICD-10-CM

## 2024-04-23 LAB
ANION GAP SERPL CALCULATED.3IONS-SCNC: 9.6 MMOL/L (ref 5–15)
BACTERIA UR QL AUTO: NORMAL /HPF
BILIRUB UR QL STRIP: NEGATIVE
BUN SERPL-MCNC: 8 MG/DL (ref 6–20)
BUN/CREAT SERPL: 11.1 (ref 7–25)
CALCIUM SPEC-SCNC: 8.8 MG/DL (ref 8.6–10.5)
CHLORIDE SERPL-SCNC: 103 MMOL/L (ref 98–107)
CLARITY UR: CLEAR
CO2 SERPL-SCNC: 25.4 MMOL/L (ref 22–29)
COLOR UR: ABNORMAL
CREAT SERPL-MCNC: 0.72 MG/DL (ref 0.57–1)
EGFRCR SERPLBLD CKD-EPI 2021: 102.6 ML/MIN/1.73
GLUCOSE SERPL-MCNC: 100 MG/DL (ref 65–99)
GLUCOSE UR STRIP-MCNC: NEGATIVE MG/DL
HGB UR QL STRIP.AUTO: NEGATIVE
HYALINE CASTS UR QL AUTO: NORMAL /LPF
KETONES UR QL STRIP: NEGATIVE
LEUKOCYTE ESTERASE UR QL STRIP.AUTO: NEGATIVE
LYMPHOCYTES # BLD MANUAL: 4.05 10*3/MM3 (ref 0.7–3.1)
LYMPHOCYTES NFR BLD MANUAL: 5 % (ref 5–12)
MONOCYTES # BLD: 0.44 10*3/MM3 (ref 0.1–0.9)
NEUTROPHILS # BLD AUTO: 4.32 10*3/MM3 (ref 1.7–7)
NEUTROPHILS NFR BLD MANUAL: 49 % (ref 42.7–76)
NITRITE UR QL STRIP: NEGATIVE
PH UR STRIP.AUTO: 8.5 [PH] (ref 5–8)
PLAT MORPH BLD: NORMAL
POIKILOCYTOSIS BLD QL SMEAR: ABNORMAL
POTASSIUM SERPL-SCNC: 4.1 MMOL/L (ref 3.5–5.2)
PROT UR QL STRIP: ABNORMAL
RBC # UR STRIP: NORMAL /HPF
REF LAB TEST METHOD: NORMAL
SMUDGE CELLS BLD QL SMEAR: ABNORMAL
SODIUM SERPL-SCNC: 138 MMOL/L (ref 136–145)
SP GR UR STRIP: 1.03 (ref 1–1.03)
SPHEROCYTES BLD QL SMEAR: ABNORMAL
SQUAMOUS #/AREA URNS HPF: NORMAL /HPF
UROBILINOGEN UR QL STRIP: ABNORMAL
VARIANT LYMPHS NFR BLD MANUAL: 46 % (ref 19.6–45.3)
WBC # UR STRIP: NORMAL /HPF

## 2024-04-23 PROCEDURE — 85025 COMPLETE CBC W/AUTO DIFF WBC: CPT

## 2024-04-23 PROCEDURE — 36415 COLL VENOUS BLD VENIPUNCTURE: CPT

## 2024-04-23 PROCEDURE — 80048 BASIC METABOLIC PNL TOTAL CA: CPT

## 2024-04-23 PROCEDURE — 87086 URINE CULTURE/COLONY COUNT: CPT

## 2024-04-23 PROCEDURE — 81001 URINALYSIS AUTO W/SCOPE: CPT

## 2024-04-23 PROCEDURE — 85007 BL SMEAR W/DIFF WBC COUNT: CPT

## 2024-04-23 PROCEDURE — 71046 X-RAY EXAM CHEST 2 VIEWS: CPT

## 2024-04-24 ENCOUNTER — TELEPHONE (OUTPATIENT)
Dept: SURGERY | Facility: CLINIC | Age: 49
End: 2024-04-24
Payer: OTHER GOVERNMENT

## 2024-04-24 LAB
BACTERIA SPEC AEROBE CULT: NORMAL
DEPRECATED RDW RBC AUTO: 43 FL (ref 37–54)
ERYTHROCYTE [DISTWIDTH] IN BLOOD BY AUTOMATED COUNT: 12.6 % (ref 12.3–15.4)
HCT VFR BLD AUTO: 39.2 % (ref 34–46.6)
HGB BLD-MCNC: 12.8 G/DL (ref 12–15.9)
MCH RBC QN AUTO: 30.5 PG (ref 26.6–33)
MCHC RBC AUTO-ENTMCNC: 32.7 G/DL (ref 31.5–35.7)
MCV RBC AUTO: 93.3 FL (ref 79–97)
PATHOLOGY REVIEW: YES
PLATELET # BLD AUTO: 289 10*3/MM3 (ref 140–450)
PMV BLD AUTO: 12 FL (ref 6–12)
RBC # BLD AUTO: 4.2 10*6/MM3 (ref 3.77–5.28)
WBC NRBC COR # BLD AUTO: 8.81 10*3/MM3 (ref 3.4–10.8)

## 2024-04-25 ENCOUNTER — OFFICE VISIT (OUTPATIENT)
Dept: UROLOGY | Facility: CLINIC | Age: 49
End: 2024-04-25
Payer: OTHER GOVERNMENT

## 2024-04-25 VITALS
WEIGHT: 187 LBS | HEIGHT: 66 IN | BODY MASS INDEX: 30.05 KG/M2 | SYSTOLIC BLOOD PRESSURE: 127 MMHG | DIASTOLIC BLOOD PRESSURE: 74 MMHG

## 2024-04-25 DIAGNOSIS — C64.9 RENAL CELL CARCINOMA, UNSPECIFIED LATERALITY: Primary | ICD-10-CM

## 2024-04-25 NOTE — PROGRESS NOTES
UROLOGY OFFICE follow up NOTE    Subjective   HPI  Geri Bowie is a 49 y.o. female. . Presents for follow-up renal cell carcinoma and history of nephrolithiasis requiring intervention and right partial robotic nephrectomy, 3/2021.  Surveillance imaging and labs prior to today's visit.     Patient notes some lower back pain, wonders if she has a UTI. Repeat dip performed in office today, was negative for infection.     Patient wonders about her genetic risk of renal cell carcinoma particularly when thinking about her children.  Notes that she was 46 at time of diagnosis.    Update 4/25/2024: Patient presents for follow-up RCC, nephrolithiasis.  CT scan, chest x-ray and labs prior.  Overall doing well.  States she does not drink enough fluids.  Has appoint with GYN next week.      ________________  Right partial nephrectomy pathology, 3/23/2021: Clear-cell renal cell carcinoma, pT1a, (1.7 cm) margin negative    CT abdomen with and without 4/9/2024: Postoperative changes anterior mid to lower right kidney from previous  partial nephrectomy. No suspicious enhancement or other findings. No  abnormal adenopathy is seen.     Punctate nonobstructing left-sided nephrolith new compared to prior CT.    Chest x-ray   4/23/2024: No acute cardiopulmonary disease; no evidence of metastatic disease  4/18/2023 no acute cardiopulmonary process     CT abdomen pelvis with and without contrast 4/7/2023: No renal or ureteral stone seen.  No hydronephrosis.  Surgical changes anterior pole right kidney stable.     UA,   4/23/2024: 30+ protein; negative for infection or hematuria  4/18/2023: Suspicious for infection with 4+ bacteria, WBCs, 3-5 RBCs, 7-12 squamous     CBC   /20 3/2024: Unremarkable  4/18/2023, unremarkable    Creatinine,   4/23/2024: 0.72  4/18/2023: 0.64      Stone analysis, 1/11/2021: 100% calcium oxalate      Review of systems  A review of systems was performed, and positive findings are noted in the  "HPI.    Objective     Vital Signs:   /74   Ht 167.6 cm (66\")   Wt 84.8 kg (187 lb)   BMI 30.18 kg/m²       Physical exam  No acute distress, well-nourished  Awake alert and oriented  Mood normal; affect normal    Problem List:  Patient Active Problem List   Diagnosis    Breast lump    Hematochezia    High cholesterol    Pain of breast    Psoriasis    Abnormal glucose level    Dysmenorrhea    Malignant tumor of kidney    Obesity    Obstructive sleep apnea syndrome    Seasonal allergic rhinitis    Vitamin B12 deficiency    Vitamin D deficiency       Assessment & Plan   Diagnoses and all orders for this visit:    1. Renal cell carcinoma, unspecified laterality (Primary)  -     CT Abdomen With & Without Contrast; Future  -     XR Chest 2 View; Future  -     Basic Metabolic Panel; Future  -     CBC (No Diff); Future  -     Urinalysis With Microscopic - Urine, Clean Catch; Future    Doing well  Encouraged ample hydration.     pT1a renal cell carcinoma -balance Labs and imaging reviewed, no evidence of disease.     Plan for surveillance studies again in 1 year per AUA and NCCN guidelines        Follow-up 1 year studies prior or earlier as needed  All question addressed          Transcribed from ambient dictation for Emily Sam MD by Neelam Alejandra.  04/25/24   17:30 EDT    Patient or patient representative verbalized consent to the visit recording.  I have personally performed the services described in this document as transcribed by the above individual, and it is both accurate and complete.     "

## 2024-04-26 LAB
LAB AP CASE REPORT: NORMAL
PATH REPORT.FINAL DX SPEC: NORMAL

## 2024-05-07 DIAGNOSIS — D72.820 LYMPHOCYTOSIS: Primary | ICD-10-CM

## 2024-05-09 ENCOUNTER — TELEPHONE (OUTPATIENT)
Dept: UROLOGY | Facility: CLINIC | Age: 49
End: 2024-05-09
Payer: OTHER GOVERNMENT

## 2024-05-10 ENCOUNTER — OFFICE VISIT (OUTPATIENT)
Dept: OBSTETRICS AND GYNECOLOGY | Facility: CLINIC | Age: 49
End: 2024-05-10
Payer: OTHER GOVERNMENT

## 2024-05-10 VITALS
BODY MASS INDEX: 30.7 KG/M2 | SYSTOLIC BLOOD PRESSURE: 121 MMHG | DIASTOLIC BLOOD PRESSURE: 78 MMHG | HEART RATE: 79 BPM | HEIGHT: 66 IN | WEIGHT: 191 LBS

## 2024-05-10 DIAGNOSIS — Z01.419 WELL WOMAN EXAM WITH ROUTINE GYNECOLOGICAL EXAM: Primary | ICD-10-CM

## 2024-05-10 DIAGNOSIS — Z12.11 ENCOUNTER FOR SCREENING COLONOSCOPY: ICD-10-CM

## 2024-05-10 DIAGNOSIS — N92.0 MENORRHAGIA WITH REGULAR CYCLE: ICD-10-CM

## 2024-05-10 PROCEDURE — G0123 SCREEN CERV/VAG THIN LAYER: HCPCS | Performed by: OBSTETRICS & GYNECOLOGY

## 2024-05-10 PROCEDURE — 87624 HPV HI-RISK TYP POOLED RSLT: CPT | Performed by: OBSTETRICS & GYNECOLOGY

## 2024-05-10 NOTE — TELEPHONE ENCOUNTER
Called and spoke with Radha at pts PCP office. She has placed a message for the doctor about the referral.

## 2024-05-13 NOTE — TELEPHONE ENCOUNTER
Called PCP office and spoke with Radha. She informed that they went ahead and sent over a referral to Hematology [facility] Oncology.

## 2024-05-14 ENCOUNTER — TELEPHONE (OUTPATIENT)
Dept: GASTROENTEROLOGY | Facility: CLINIC | Age: 49
End: 2024-05-14
Payer: OTHER GOVERNMENT

## 2024-05-14 LAB
CYTOLOGIST CVX/VAG CYTO: NORMAL
CYTOLOGY CVX/VAG DOC CYTO: NORMAL
CYTOLOGY CVX/VAG DOC THIN PREP: NORMAL
DX ICD CODE: NORMAL
HPV I/H RISK 4 DNA CVX QL PROBE+SIG AMP: NEGATIVE
Lab: NORMAL
OTHER STN SPEC: NORMAL
STAT OF ADQ CVX/VAG CYTO-IMP: NORMAL

## 2024-05-14 NOTE — TELEPHONE ENCOUNTER
Left voice message in regards to a referral we received from Yesi Tirado for a colon screening. Will defer for two days to give patient time to return call.

## 2024-05-15 ENCOUNTER — TELEPHONE (OUTPATIENT)
Dept: UROLOGY | Facility: CLINIC | Age: 49
End: 2024-05-15
Payer: OTHER GOVERNMENT

## 2024-05-15 NOTE — TELEPHONE ENCOUNTER
Called pts PCP and spoke with Nicole. She states it got mixed up. Disregard the referral back to us and she will send a referral to [facility]

## 2024-05-17 NOTE — TELEPHONE ENCOUNTER
Left voice message in regards to a referral we received from Yesi Tirado for a colon screening.This is the second attempted in trying to contact patient. Will defer for two days to give patient time to return call.

## 2024-05-17 NOTE — TELEPHONE ENCOUNTER
Called PCP office and spoke with Radha, following up on referral. She states it was sent on 5/10/2024.

## 2024-05-22 ENCOUNTER — TELEPHONE (OUTPATIENT)
Dept: OBSTETRICS AND GYNECOLOGY | Facility: CLINIC | Age: 49
End: 2024-05-22
Payer: OTHER GOVERNMENT

## 2024-05-22 NOTE — TELEPHONE ENCOUNTER
Spoke with patient about [facility] referrals / asked patient to get a referral from PCP to Dr. Tirado back dated from 5/10/24 to cover [employer] and also to cover pending TLH / also ask if patient could ask PCP to get [facility] referral to Dr Goodman for screening colonoscopy / patient voiced understanding and will contact PCP tomorrow 5/23/24   (M6) obeys commands

## 2024-06-11 ENCOUNTER — HOSPITAL ENCOUNTER (OUTPATIENT)
Dept: MAMMOGRAPHY | Facility: HOSPITAL | Age: 49
Discharge: HOME | End: 2024-06-11
Admitting: OBSTETRICS & GYNECOLOGY
Payer: OTHER GOVERNMENT

## 2024-06-11 DIAGNOSIS — Z01.419 WELL WOMAN EXAM WITH ROUTINE GYNECOLOGICAL EXAM: ICD-10-CM

## 2024-06-11 PROCEDURE — 77063 BREAST TOMOSYNTHESIS BI: CPT

## 2024-06-11 PROCEDURE — 77067 SCR MAMMO BI INCL CAD: CPT

## 2024-06-18 ENCOUNTER — HOSPITAL ENCOUNTER (OUTPATIENT)
Dept: BONE DENSITY | Facility: HOSPITAL | Age: 49
Discharge: HOME | End: 2024-06-18
Admitting: INTERNAL MEDICINE
Payer: OTHER GOVERNMENT

## 2024-06-18 DIAGNOSIS — Z78.0 POST-MENOPAUSAL: ICD-10-CM

## 2024-06-18 PROCEDURE — 77080 DXA BONE DENSITY AXIAL: CPT

## 2024-07-21 ENCOUNTER — HOSPITAL ENCOUNTER (EMERGENCY)
Facility: HOSPITAL | Age: 49
Discharge: HOME | End: 2024-07-21
Attending: EMERGENCY MEDICINE | Admitting: EMERGENCY MEDICINE
Payer: OTHER GOVERNMENT

## 2024-07-21 ENCOUNTER — APPOINTMENT (OUTPATIENT)
Dept: CT IMAGING | Facility: HOSPITAL | Age: 49
End: 2024-07-21
Payer: OTHER GOVERNMENT

## 2024-07-21 VITALS
HEART RATE: 83 BPM | WEIGHT: 194.22 LBS | HEIGHT: 66 IN | BODY MASS INDEX: 31.21 KG/M2 | OXYGEN SATURATION: 99 % | RESPIRATION RATE: 18 BRPM | TEMPERATURE: 98.5 F | SYSTOLIC BLOOD PRESSURE: 121 MMHG | DIASTOLIC BLOOD PRESSURE: 58 MMHG

## 2024-07-21 DIAGNOSIS — H66.90 ACUTE OTITIS MEDIA, UNSPECIFIED OTITIS MEDIA TYPE: ICD-10-CM

## 2024-07-21 DIAGNOSIS — R10.11 RIGHT UPPER QUADRANT ABDOMINAL PAIN: Primary | ICD-10-CM

## 2024-07-21 LAB
ALBUMIN SERPL-MCNC: 4.3 G/DL (ref 3.5–5.2)
ALBUMIN/GLOB SERPL: 2 G/DL
ALP SERPL-CCNC: 74 U/L (ref 39–117)
ALT SERPL W P-5'-P-CCNC: 12 U/L (ref 1–33)
ANION GAP SERPL CALCULATED.3IONS-SCNC: 7.8 MMOL/L (ref 5–15)
AST SERPL-CCNC: 14 U/L (ref 1–32)
BACTERIA UR QL AUTO: ABNORMAL /HPF
BASOPHILS # BLD AUTO: 0.03 10*3/MM3 (ref 0–0.2)
BASOPHILS NFR BLD AUTO: 0.4 % (ref 0–1.5)
BILIRUB SERPL-MCNC: 0.5 MG/DL (ref 0–1.2)
BILIRUB UR QL STRIP: NEGATIVE
BUN SERPL-MCNC: 10 MG/DL (ref 6–20)
BUN/CREAT SERPL: 15.2 (ref 7–25)
CALCIUM SPEC-SCNC: 9 MG/DL (ref 8.6–10.5)
CHLORIDE SERPL-SCNC: 106 MMOL/L (ref 98–107)
CLARITY UR: CLEAR
CO2 SERPL-SCNC: 27.2 MMOL/L (ref 22–29)
COLOR UR: YELLOW
CREAT SERPL-MCNC: 0.66 MG/DL (ref 0.57–1)
DEPRECATED RDW RBC AUTO: 45.8 FL (ref 37–54)
EGFRCR SERPLBLD CKD-EPI 2021: 107.7 ML/MIN/1.73
EOSINOPHIL # BLD AUTO: 0.02 10*3/MM3 (ref 0–0.4)
EOSINOPHIL NFR BLD AUTO: 0.3 % (ref 0.3–6.2)
ERYTHROCYTE [DISTWIDTH] IN BLOOD BY AUTOMATED COUNT: 13.4 % (ref 12.3–15.4)
GLOBULIN UR ELPH-MCNC: 2.2 GM/DL
GLUCOSE SERPL-MCNC: 147 MG/DL (ref 65–99)
GLUCOSE UR STRIP-MCNC: NEGATIVE MG/DL
HCG INTACT+B SERPL-ACNC: <0.5 MIU/ML
HCT VFR BLD AUTO: 37.4 % (ref 34–46.6)
HGB BLD-MCNC: 12.5 G/DL (ref 12–15.9)
HGB UR QL STRIP.AUTO: ABNORMAL
HOLD SPECIMEN: NORMAL
HOLD SPECIMEN: NORMAL
HYALINE CASTS UR QL AUTO: ABNORMAL /LPF
IMM GRANULOCYTES # BLD AUTO: 0.02 10*3/MM3 (ref 0–0.05)
IMM GRANULOCYTES NFR BLD AUTO: 0.3 % (ref 0–0.5)
KETONES UR QL STRIP: NEGATIVE
LEUKOCYTE ESTERASE UR QL STRIP.AUTO: NEGATIVE
LIPASE SERPL-CCNC: 30 U/L (ref 13–60)
LYMPHOCYTES # BLD AUTO: 3.03 10*3/MM3 (ref 0.7–3.1)
LYMPHOCYTES NFR BLD AUTO: 42.3 % (ref 19.6–45.3)
MCH RBC QN AUTO: 30.8 PG (ref 26.6–33)
MCHC RBC AUTO-ENTMCNC: 33.4 G/DL (ref 31.5–35.7)
MCV RBC AUTO: 92.1 FL (ref 79–97)
MONOCYTES # BLD AUTO: 0.36 10*3/MM3 (ref 0.1–0.9)
MONOCYTES NFR BLD AUTO: 5 % (ref 5–12)
NEUTROPHILS NFR BLD AUTO: 3.7 10*3/MM3 (ref 1.7–7)
NEUTROPHILS NFR BLD AUTO: 51.7 % (ref 42.7–76)
NITRITE UR QL STRIP: NEGATIVE
NRBC BLD AUTO-RTO: 0 /100 WBC (ref 0–0.2)
PH UR STRIP.AUTO: 6.5 [PH] (ref 5–8)
PLATELET # BLD AUTO: 253 10*3/MM3 (ref 140–450)
PMV BLD AUTO: 10.2 FL (ref 6–12)
POTASSIUM SERPL-SCNC: 3.4 MMOL/L (ref 3.5–5.2)
PROT SERPL-MCNC: 6.5 G/DL (ref 6–8.5)
PROT UR QL STRIP: NEGATIVE
RBC # BLD AUTO: 4.06 10*6/MM3 (ref 3.77–5.28)
RBC # UR STRIP: ABNORMAL /HPF
REF LAB TEST METHOD: ABNORMAL
SODIUM SERPL-SCNC: 141 MMOL/L (ref 136–145)
SP GR UR STRIP: >1.03 (ref 1–1.03)
SQUAMOUS #/AREA URNS HPF: ABNORMAL /HPF
UROBILINOGEN UR QL STRIP: ABNORMAL
WBC # UR STRIP: ABNORMAL /HPF
WBC NRBC COR # BLD AUTO: 7.16 10*3/MM3 (ref 3.4–10.8)
WHOLE BLOOD HOLD COAG: NORMAL
WHOLE BLOOD HOLD SPECIMEN: NORMAL

## 2024-07-21 PROCEDURE — 99285 EMERGENCY DEPT VISIT HI MDM: CPT

## 2024-07-21 PROCEDURE — 74177 CT ABD & PELVIS W/CONTRAST: CPT

## 2024-07-21 PROCEDURE — 96375 TX/PRO/DX INJ NEW DRUG ADDON: CPT

## 2024-07-21 PROCEDURE — 80053 COMPREHEN METABOLIC PANEL: CPT | Performed by: EMERGENCY MEDICINE

## 2024-07-21 PROCEDURE — 25510000001 IOPAMIDOL PER 1 ML: Performed by: EMERGENCY MEDICINE

## 2024-07-21 PROCEDURE — 96374 THER/PROPH/DIAG INJ IV PUSH: CPT

## 2024-07-21 PROCEDURE — 85025 COMPLETE CBC W/AUTO DIFF WBC: CPT | Performed by: EMERGENCY MEDICINE

## 2024-07-21 PROCEDURE — 81001 URINALYSIS AUTO W/SCOPE: CPT | Performed by: EMERGENCY MEDICINE

## 2024-07-21 PROCEDURE — 25010000002 KETOROLAC TROMETHAMINE PER 15 MG

## 2024-07-21 PROCEDURE — 84702 CHORIONIC GONADOTROPIN TEST: CPT | Performed by: EMERGENCY MEDICINE

## 2024-07-21 PROCEDURE — 83690 ASSAY OF LIPASE: CPT | Performed by: EMERGENCY MEDICINE

## 2024-07-21 PROCEDURE — 25010000002 ONDANSETRON PER 1 MG

## 2024-07-21 PROCEDURE — 25810000003 SODIUM CHLORIDE 0.9 % SOLUTION

## 2024-07-21 RX ORDER — AZITHROMYCIN 250 MG/1
TABLET, FILM COATED ORAL
Qty: 6 TABLET | Refills: 0 | Status: SHIPPED | OUTPATIENT
Start: 2024-07-21

## 2024-07-21 RX ORDER — MECLIZINE HYDROCHLORIDE 25 MG/1
25 TABLET ORAL 3 TIMES DAILY PRN
Qty: 15 TABLET | Refills: 0 | Status: SHIPPED | OUTPATIENT
Start: 2024-07-21 | End: 2024-07-21

## 2024-07-21 RX ORDER — IBUPROFEN 800 MG/1
800 TABLET ORAL EVERY 8 HOURS PRN
Qty: 9 TABLET | Refills: 0 | Status: SHIPPED | OUTPATIENT
Start: 2024-07-21 | End: 2024-07-24

## 2024-07-21 RX ORDER — ONDANSETRON 4 MG/1
4 TABLET, ORALLY DISINTEGRATING ORAL EVERY 8 HOURS PRN
Qty: 15 TABLET | Refills: 0 | Status: SHIPPED | OUTPATIENT
Start: 2024-07-21 | End: 2024-07-26

## 2024-07-21 RX ORDER — SODIUM CHLORIDE 0.9 % (FLUSH) 0.9 %
10 SYRINGE (ML) INJECTION AS NEEDED
Status: DISCONTINUED | OUTPATIENT
Start: 2024-07-21 | End: 2024-07-21 | Stop reason: HOSPADM

## 2024-07-21 RX ORDER — MECLIZINE HYDROCHLORIDE 25 MG/1
25 TABLET ORAL 3 TIMES DAILY PRN
Qty: 15 TABLET | Refills: 0 | Status: SHIPPED | OUTPATIENT
Start: 2024-07-21 | End: 2024-07-26

## 2024-07-21 RX ORDER — ONDANSETRON 2 MG/ML
4 INJECTION INTRAMUSCULAR; INTRAVENOUS ONCE
Status: COMPLETED | OUTPATIENT
Start: 2024-07-21 | End: 2024-07-21

## 2024-07-21 RX ORDER — IBUPROFEN 800 MG/1
800 TABLET ORAL EVERY 8 HOURS PRN
Qty: 9 TABLET | Refills: 0 | Status: SHIPPED | OUTPATIENT
Start: 2024-07-21 | End: 2024-07-21

## 2024-07-21 RX ORDER — KETOROLAC TROMETHAMINE 30 MG/ML
30 INJECTION, SOLUTION INTRAMUSCULAR; INTRAVENOUS ONCE
Status: COMPLETED | OUTPATIENT
Start: 2024-07-21 | End: 2024-07-21

## 2024-07-21 RX ORDER — AZITHROMYCIN 250 MG/1
TABLET, FILM COATED ORAL
Qty: 6 TABLET | Refills: 0 | Status: SHIPPED | OUTPATIENT
Start: 2024-07-21 | End: 2024-07-21

## 2024-07-21 RX ORDER — ONDANSETRON 4 MG/1
4 TABLET, ORALLY DISINTEGRATING ORAL EVERY 8 HOURS PRN
Qty: 15 TABLET | Refills: 0 | Status: SHIPPED | OUTPATIENT
Start: 2024-07-21 | End: 2024-07-21

## 2024-07-21 RX ADMIN — ONDANSETRON 4 MG: 2 INJECTION INTRAMUSCULAR; INTRAVENOUS at 16:52

## 2024-07-21 RX ADMIN — KETOROLAC TROMETHAMINE 30 MG: 30 INJECTION, SOLUTION INTRAMUSCULAR; INTRAVENOUS at 16:52

## 2024-07-21 RX ADMIN — SODIUM CHLORIDE 1000 ML: 9 INJECTION, SOLUTION INTRAVENOUS at 16:52

## 2024-07-21 RX ADMIN — IOPAMIDOL 100 ML: 755 INJECTION, SOLUTION INTRAVENOUS at 18:01

## 2024-07-21 NOTE — ED PROVIDER NOTES
Time: 4:49 PM EDT  Date of encounter:  7/21/2024  Independent Historian/Clinical History and Information was obtained by:   Patient    History is limited by: N/A    Chief Complaint   Patient presents with    Flank Pain         History of Present Illness:  Patient is a 49 y.o. year old female who presents to the emergency department for evaluation of flank pain.  Patient reports that she has been having left mid back and flank pain for 1 week but over the past 3 days it has gotten worse.  Now the pain is concentrated primarily in her left upper quadrant.  She reports a history of a partial left nephrectomy but reports no further issues since that time.  She is currently being followed by Dr. aSm.  Denies constipation, vomiting, diarrhea, or fever    Patient Care Team  Primary Care Provider: Nicole Tapia MD    Past Medical History:     Allergies   Allergen Reactions    Amoxicillin Anaphylaxis     Past Medical History:   Diagnosis Date    Abnormal Pap smear of cervix     Cancer     PARTIAL NEPHRECTOMY SURGICAL INTERVENTION ONLY    Cervical dysplasia     Hyperlipidemia     Migraine     Pain in joint, ankle and foot 09/27/2021    Primary localized osteoarthrosis of ankle and foot 04/07/2022    Psoriasis (a type of skin inflammation)     Rotator cuff arthropathy of right shoulder 04/07/2022    Submucous leiomyoma of uterus 07/21/2022     Past Surgical History:   Procedure Laterality Date    ANKLE OPEN REDUCTION INTERNAL FIXATION Right 2016    D & C HYSTEROSCOPY N/A 8/15/2022    Procedure: DILATATION AND CURETTAGE HYSTEROSCOPY;  Surgeon: Chavez Daomn MD;  Location: [facility] OR;  Service: Obstetrics/Gynecology;  Laterality: N/A;    NEPHRECTOMY PARTIAL Right 03/2021    Kidney Cancer     Family History   Problem Relation Age of Onset    Malig Hyperthermia Neg Hx     Ovarian cancer Neg Hx     Uterine cancer Neg Hx     Breast cancer Neg Hx     Colon cancer Neg Hx     Deep vein thrombosis Neg Hx     Pulmonary  "embolism Neg Hx        Home Medications:  Prior to Admission medications    Medication Sig Start Date End Date Taking? Authorizing Provider   Guselkumab (Tremfya) 100 MG/ML solution prefilled syringe as directed subcutaneously every 8 weeks    Hollis Gonzalez MD   rosuvastatin (CRESTOR) 20 MG tablet Take 1 tablet by mouth Daily. 7/30/21   Hollis Gonzalez MD   vitamin B-12 (CYANOCOBALAMIN) 1000 MCG tablet  6/21/23   Hollis Gonzalez MD   vitamin D (ERGOCALCIFEROL) 1.25 MG (17097 UT) capsule capsule  11/16/22   Hollis Gonzalez MD        Social History:   Social History     Tobacco Use    Smoking status: Every Day     Current packs/day: 0.50     Average packs/day: 0.5 packs/day for 32.9 years (16.4 ttl pk-yrs)     Types: Cigarettes     Start date: 9/8/1991    Smokeless tobacco: Never    Tobacco comments:     LAST 0600 8/15/22   Vaping Use    Vaping status: Never Used   Substance Use Topics    Alcohol use: Not Currently    Drug use: Never         Review of Systems:  Review of Systems   Constitutional: Negative.    HENT: Negative.     Eyes: Negative.    Respiratory: Negative.     Cardiovascular: Negative.    Gastrointestinal: Negative.  Positive for abdominal pain.   Endocrine: Negative.    Genitourinary: Negative.  Positive for flank pain.   Musculoskeletal: Negative.    Skin: Negative.    Allergic/Immunologic: Negative.    Neurological: Negative.    Hematological: Negative.    Psychiatric/Behavioral: Negative.          Physical Exam:  /58 (BP Location: Right arm, Patient Position: Sitting)   Pulse 83   Temp 98.5 °F (36.9 °C) (Oral)   Resp 18   Ht 167.6 cm (66\")   Wt 88.1 kg (194 lb 3.6 oz)   SpO2 99%   BMI 31.35 kg/m²         Physical Exam  Vitals and nursing note reviewed.   Constitutional:       General: She is not in acute distress.     Appearance: Normal appearance. She is not toxic-appearing.   HENT:      Head: Normocephalic and atraumatic.      Jaw: There is normal jaw " occlusion.   Eyes:      General: Lids are normal.      Extraocular Movements: Extraocular movements intact.      Conjunctiva/sclera: Conjunctivae normal.      Pupils: Pupils are equal, round, and reactive to light.   Cardiovascular:      Rate and Rhythm: Normal rate and regular rhythm.      Pulses: Normal pulses.      Heart sounds: Normal heart sounds.   Pulmonary:      Effort: Pulmonary effort is normal. No respiratory distress.      Breath sounds: Normal breath sounds. No wheezing or rhonchi.   Abdominal:      General: Abdomen is flat.      Palpations: Abdomen is soft.      Tenderness: There is no abdominal tenderness. There is no guarding or rebound.   Musculoskeletal:         General: Normal range of motion.      Cervical back: Normal range of motion and neck supple.      Right lower leg: No edema.      Left lower leg: No edema.   Skin:     General: Skin is warm and dry.   Neurological:      Mental Status: She is alert and oriented to person, place, and time. Mental status is at baseline.   Psychiatric:         Mood and Affect: Mood normal.                   Procedures:  Procedures      Medical Decision Making:      Comorbidities that affect care:    Migraine, cervical dysplasia, submucosal leiomyoma of the uterus    External Notes reviewed:    Previous Clinic Note: Patient was last seen on 5/10/2024 by Dr. Tirado for a well woman exam.      The following orders were placed and all results were independently analyzed by me:  Orders Placed This Encounter   Procedures    CT Abdomen Pelvis With Contrast    Banquete Draw    Comprehensive Metabolic Panel    Lipase    Urinalysis With Microscopic If Indicated (No Culture) - Urine, Clean Catch    hCG, Quantitative, Pregnancy    CBC Auto Differential    Urinalysis, Microscopic Only - Urine, Clean Catch    CBC & Differential    Green Top (Gel)    Lavender Top    Gold Top - SST    Light Blue Top       Medications Given in the Emergency Department:  Medications   sodium  chloride 0.9 % bolus 1,000 mL (0 mL Intravenous Stopped 7/21/24 1722)   ketorolac (TORADOL) injection 30 mg (30 mg Intravenous Given 7/21/24 1652)   ondansetron (ZOFRAN) injection 4 mg (4 mg Intravenous Given 7/21/24 1652)   iopamidol (ISOVUE-370) 76 % injection 100 mL (100 mL Intravenous Given 7/21/24 1801)        ED Course:    The patient was initially evaluated in the triage area where orders were placed. The patient was later dispositioned by VELVET Martinez.      The patient was advised to stay for completion of workup which includes but is not limited to communication of labs and radiological results, reassessment and plan. The patient was advised that leaving prior to disposition by a provider could result in critical findings that are not communicated to the patient.          Labs:    Lab Results (last 24 hours)       Procedure Component Value Units Date/Time    CBC & Differential [[id number]]  (Normal) Collected: 07/21/24 1647    Specimen: Blood from Arm, Left Updated: 07/21/24 1653    Narrative:      The following orders were created for panel order CBC & Differential.  Procedure                               Abnormality         Status                     ---------                               -----------         ------                     CBC Auto Differential[[id number]]        Normal              Final result                 Please view results for these tests on the individual orders.    Comprehensive Metabolic Panel [[id number]]  (Abnormal) Collected: 07/21/24 1647    Specimen: Blood from Arm, Left Updated: 07/21/24 1720     Glucose 147 mg/dL      BUN 10 mg/dL      Creatinine 0.66 mg/dL      Sodium 141 mmol/L      Potassium 3.4 mmol/L      Chloride 106 mmol/L      CO2 27.2 mmol/L      Calcium 9.0 mg/dL      Total Protein 6.5 g/dL      Albumin 4.3 g/dL      ALT (SGPT) 12 U/L      AST (SGOT) 14 U/L      Alkaline Phosphatase 74 U/L      Total Bilirubin 0.5 mg/dL      Globulin 2.2 gm/dL      A/G Ratio  2.0 g/dL      BUN/Creatinine Ratio 15.2     Anion Gap 7.8 mmol/L      eGFR 107.7 mL/min/1.73     Narrative:      GFR Normal >60  Chronic Kidney Disease <60  Kidney Failure <15      Lipase [[id number]]  (Normal) Collected: 07/21/24 1647    Specimen: Blood from Arm, Left Updated: 07/21/24 1720     Lipase 30 U/L     hCG, Quantitative, Pregnancy [[id number]] Collected: 07/21/24 1647    Specimen: Blood from Arm, Left Updated: 07/21/24 1718     HCG Quantitative <0.50 mIU/mL     Narrative:      HCG Ranges by Gestational Age    Females - non-pregnant premenopausal   </= 1mIU/mL HCG  Females - postmenopausal               </= 7mIU/mL HCG    3 Weeks       5.4   -      72 mIU/mL  4 Weeks      10.2   -     708 mIU/mL  5 Weeks       217   -   8,245 mIU/mL  6 Weeks       152   -  32,177 mIU/mL  7 Weeks     4,059   - 153,767 mIU/mL  8 Weeks    31,366   - 149,094 mIU/mL  9 Weeks    59,109   - 135,901 mIU/mL  10 Weeks   44,186   - 170,409 mIU/mL  12 Weeks   27,107   - 201,615 mIU/mL  14 Weeks   24,302   -  93,646 mIU/mL  15 Weeks   12,540   -  69,747 mIU/mL  16 Weeks    8,904   -  55,332 mIU/mL  17 Weeks    8,240   -  51,793 mIU/mL  18 Weeks    9,649   -  55,271 mIU/mL      CBC Auto Differential [[id number]]  (Normal) Collected: 07/21/24 1647    Specimen: Blood from Arm, Left Updated: 07/21/24 1653     WBC 7.16 10*3/mm3      RBC 4.06 10*6/mm3      Hemoglobin 12.5 g/dL      Hematocrit 37.4 %      MCV 92.1 fL      MCH 30.8 pg      MCHC 33.4 g/dL      RDW 13.4 %      RDW-SD 45.8 fl      MPV 10.2 fL      Platelets 253 10*3/mm3      Neutrophil % 51.7 %      Lymphocyte % 42.3 %      Monocyte % 5.0 %      Eosinophil % 0.3 %      Basophil % 0.4 %      Immature Grans % 0.3 %      Neutrophils, Absolute 3.70 10*3/mm3      Lymphocytes, Absolute 3.03 10*3/mm3      Monocytes, Absolute 0.36 10*3/mm3      Eosinophils, Absolute 0.02 10*3/mm3      Basophils, Absolute 0.03 10*3/mm3      Immature Grans, Absolute 0.02 10*3/mm3      nRBC 0.0 /100 WBC      Urinalysis With Microscopic If Indicated (No Culture) - Urine, Clean Catch [[id number]]  (Abnormal) Collected: 07/21/24 1846    Specimen: Urine, Clean Catch Updated: 07/21/24 1856     Color, UA Yellow     Appearance, UA Clear     pH, UA 6.5     Specific Gravity, UA >1.030     Glucose, UA Negative     Ketones, UA Negative     Bilirubin, UA Negative     Blood, UA Large (3+)     Protein, UA Negative     Leuk Esterase, UA Negative     Nitrite, UA Negative     Urobilinogen, UA 1.0 E.U./dL    Urinalysis, Microscopic Only - Urine, Clean Catch [[id number]]  (Abnormal) Collected: 07/21/24 1846    Specimen: Urine, Clean Catch Updated: 07/21/24 1905     RBC, UA 6-10 /HPF      WBC, UA 0-2 /HPF      Bacteria, UA None Seen /HPF      Squamous Epithelial Cells, UA 0-2 /HPF      Hyaline Casts, UA None Seen /LPF      Methodology Manual Light Microscopy             Imaging:    CT Abdomen Pelvis With Contrast    Result Date: 7/21/2024  CT ABDOMEN PELVIS W CONTRAST Date of Exam: 7/21/2024 5:45 PM EDT Indication: Left flank pain. Comparison: CT of the abdomen and pelvis dated 4/9/2024 Technique: Axial CT images were obtained of the abdomen and pelvis after the uneventful intravenous administration of iodinated contrast. Reconstructed coronal and sagittal images were also obtained. Automated exposure control and iterative construction methods were used. Findings: Liver: The liver is unremarkable in morphology. Multiple liver cysts are seen, largest within the left hepatic lobe measuring approximately 2.7 cm. Subcentimeter liver hypodensities are too small to characterize. No biliary dilation is seen. Gallbladder: The gallbladder is decompressed which limits its evaluation. Pancreas: Unremarkable. Spleen: Unremarkable. Adrenal glands: Unremarkable. Genitourinary tract: There are postsurgical changes of right partial nephrectomy. Kidneys are otherwise unremarkable. No hydronephrosis is seen. The visualized portions of the ureters, urinary  bladder, and pelvic organs appear unremarkable. Tampon is seen within the vagina. Gastrointestinal tract: The visualized hollow viscera demonstrate no focal lesion. There is no evidence of bowel obstruction. Appendix: Status post appendectomy. Other findings: No free air or free fluid is identified. No pathologically enlarged lymph nodes are seen. The abdominal aorta and IVC appear unremarkable. Bones and soft tissues: No acute or suspicious osseous or soft tissue lesion is identified. Lung bases: The visualized lung bases are clear.     Impression: 1.No acute abnormality is identified within the abdomen or pelvis. 2.Postsurgical changes of right partial nephrectomy. 3.Additional nonemergent findings as detailed above. Electronically Signed: Eddie Alegre MD  7/21/2024 6:11 PM EDT  Workstation ID: [id number]       Differential Diagnosis and Discussion:      Flank Pain: Differential diagnosis includes but is not limited to kidney stones, pyelonephritis, musculoskeletal disorders, renal infarction, urinary tract infection, hydronephrosis, radiculopathy, aortic aneurysm, renal cell carcinoma.    All labs were reviewed and interpreted by me.  CT scan radiology impression was interpreted by me.    MDM  Number of Diagnoses or Management Options  Acute otitis media, unspecified otitis media type  Right upper quadrant abdominal pain  Diagnosis management comments: The patient was evaluated in the emergency department. The patient is well-appearing. Exam is consistent with otitis media. The patient is able to tolerate po intake in the emergency department. The patient´s vital signs have been stable. On re-examination the patient does not appear toxic, has no meningeal signs, has no intractable vomiting, no respiratory distress and no apparent pain.  The patient was counseled to return to the ER for uncontrollable fever, intractable vomiting, excessive crying, altered mental status, decreased po intake, or any signs of  distress that they may perceive. Parents were counseled to return at any time for any concerns that they may have. The parents will pursue further outpatient evaluation with the primary care physician or other designated or consultant physician as indicated in the discharge instructions.    Patient is also evaluated for flank pain.  CT and lab work revealed no acute findings.  The patient´s CBC that was reviewed and interpreted by me shows no abnormalities of critical concern. Of note, there is no anemia requiring a blood transfusion and the platelet count is acceptable.  The patient´s CMP that was reviewed and interpretted by me shows no abnormalities of critical concern. Of note, the patient´s sodium and potassium are acceptable. The patient´s liver enzymes are unremarkable. The patient´s renal function (creatinine) is preserved. The patient has a normal anion gap.  Patient was encouraged to follow-up with Dr. Sam outpatient as previously scheduled.       Amount and/or Complexity of Data Reviewed  Clinical lab tests: reviewed  Tests in the radiology section of CPT®: reviewed         Patient Care Considerations:    SEPSIS was considered but is NOT present in the emergency department as SIRS criteria is not present.      Consultants/Shared Management Plan:    None    Social Determinants of Health:    Patient is independent, reliable, and has access to care.       Disposition and Care Coordination:    Discharged: I considered escalation of care by admitting this patient to the hospital, however workup revealed no acute findings.  Patient was given strict return instructions and is agreeable to follow-up outpatient.    I have explained the patient´s condition, diagnoses and treatment plan based on the information available to me at this time. I have answered questions and addressed any concerns. The patient has a good  understanding of the patient´s diagnosis, condition, and treatment plan as can be expected at  this point. The vital signs have been stable. The patient´s condition is stable and appropriate for discharge from the emergency department.      The patient will pursue further outpatient evaluation with the primary care physician or other designated or consulting physician as outlined in the discharge instructions. They are agreeable to this plan of care and follow-up instructions have been explained in detail. The patient has received these instructions in written format and has expressed an understanding of the discharge instructions. The patient is aware that any significant change in condition or worsening of symptoms should prompt an immediate return to this or the closest emergency department or call to 1.  I have explained discharge medications and the need for follow up with the patient/caretakers. This was also printed in the discharge instructions. Patient was discharged with the following medications and follow up:      Medication List        New Prescriptions      azithromycin 250 MG tablet  Commonly known as: Zithromax Z-Nagi  Take 2 tablets by mouth on day 1, then 1 tablet daily on days 2-5     ibuprofen 800 MG tablet  Commonly known as: ADVIL,MOTRIN  Take 1 tablet by mouth Every 8 (Eight) Hours As Needed for Mild Pain for up to 3 days.     meclizine 25 MG tablet  Commonly known as: ANTIVERT  Take 1 tablet by mouth 3 (Three) Times a Day As Needed for Dizziness for up to 5 days.     ondansetron ODT 4 MG disintegrating tablet  Commonly known as: ZOFRAN-ODT  Take 1 tablet by mouth Every 8 (Eight) Hours As Needed for Nausea or Vomiting for up to 5 days.               Where to Get Your Medications        These medications were sent to [facility] - [place], [place] - [address] [place] [address] - [phone]  - [phone]   [address] [facility] [address]      Phone: [phone]   azithromycin 250 MG tablet  ibuprofen 800 MG tablet  meclizine 25 MG tablet  ondansetron ODT 4 MG disintegrating  Nicole Collier MD  [address] [facility] [place] [address] [phone]    Call   As needed    Emily Sam MD  [address]  [place] [address] [phone]    Call   To schedule follow-up for discussion of CT results and symptoms       Final diagnoses:   Right upper quadrant abdominal pain   Acute otitis media, unspecified otitis media type        ED Disposition       ED Disposition   Discharge    Condition   Stable    Comment   --               This medical record created using voice recognition software.             Alexandra Ramirez, APRN  07/21/24 6408

## 2024-07-21 NOTE — DISCHARGE INSTRUCTIONS
Home.  Prescription for antibiotics has been sent to your pharmacy.  Please  and take as directed, completing the entire course.  Prescription for Zofran, ibuprofen, and meclizine has also been sent to your pharmacy.  Please  and take as needed for your symptoms avoid overuse of ibuprofen to prevent damage to your kidneys.  Take ibuprofen with food to prevent stomach upset.  Increase your fluid intake.    Follow-up with your primary care provider as needed.    Call Dr. Sam tomorrow to schedule an appointment for follow-up and discussion of your symptoms and CT results including the postsurgical changes.    If symptoms worsen, change presentation, or you develop new symptoms such as fever, worsening pain, or vomiting uncontrolled with medication please seek medical attention or return to the ED for further evaluation.

## 2024-07-22 ENCOUNTER — TELEPHONE (OUTPATIENT)
Dept: UROLOGY | Facility: CLINIC | Age: 49
End: 2024-07-22
Payer: OTHER GOVERNMENT

## 2024-07-22 NOTE — TELEPHONE ENCOUNTER
PATIENT CALLED AND SAID SHE WENT TO THE ER YESTERDAY, AND THEY DID A CT SCAN.  THEY TOLD HER TO CALL DR. CULLEN TO TALK TO HER ABOUT THE CT SCAN.      PATIENT HAD A PARTIAL NEPHRECTOMY A COUPLE YEARS AGO, AND THERE ARE CHANGES ON THE CT.    #[phone]

## 2024-07-22 NOTE — TELEPHONE ENCOUNTER
I reviewed the images as well as the report.  I see no concerning findings when comparing to prior CT.    If patient aware of a specific concern?      The radiologist did not specify any particular area of interest from urology standpoint on her CT scan report.      And again I did compare current imaging to prior imaging and do not see any significant change particularly of her right kidney where she had her partial nephrectomy.  Thank you

## 2024-07-23 NOTE — TELEPHONE ENCOUNTER
Patient stated that she is with left sided pain.  Was wondering about kidney stones.  Did tell patient to follow up with pcp for this pain.

## 2024-07-23 NOTE — TELEPHONE ENCOUNTER
Have an apt on 7/24/24 with pcp they made an note on chart for heme onc referral. To speak with pcp about making.

## 2024-07-25 PROBLEM — N92.0 MENORRHAGIA WITH REGULAR CYCLE: Status: ACTIVE | Noted: 2024-05-10

## 2024-07-26 ENCOUNTER — TELEPHONE (OUTPATIENT)
Dept: UROLOGY | Facility: CLINIC | Age: 49
End: 2024-07-26
Payer: OTHER GOVERNMENT

## 2024-07-26 NOTE — TELEPHONE ENCOUNTER
Called and spoke to pt. She stated she does not have a appointment yet for hematology/onco.   PT needs a  referral.

## 2024-07-26 NOTE — TELEPHONE ENCOUNTER
Called Dr Tapia's office and spoke with Joan to request  referral. Joan took the message and said that she would send to appropriate staff.

## 2024-08-17 ENCOUNTER — APPOINTMENT (OUTPATIENT)
Dept: GENERAL RADIOLOGY | Facility: HOSPITAL | Age: 49
End: 2024-08-17
Payer: OTHER GOVERNMENT

## 2024-08-17 ENCOUNTER — HOSPITAL ENCOUNTER (EMERGENCY)
Facility: HOSPITAL | Age: 49
Discharge: HOME | End: 2024-08-17
Attending: EMERGENCY MEDICINE
Payer: OTHER GOVERNMENT

## 2024-08-17 VITALS
DIASTOLIC BLOOD PRESSURE: 79 MMHG | BODY MASS INDEX: 31.46 KG/M2 | HEART RATE: 84 BPM | HEIGHT: 66 IN | RESPIRATION RATE: 18 BRPM | WEIGHT: 195.77 LBS | SYSTOLIC BLOOD PRESSURE: 148 MMHG | TEMPERATURE: 99 F | OXYGEN SATURATION: 98 %

## 2024-08-17 DIAGNOSIS — U07.1 COVID: Primary | ICD-10-CM

## 2024-08-17 LAB
ALBUMIN SERPL-MCNC: 3.8 G/DL (ref 3.5–5.2)
ALBUMIN/GLOB SERPL: 1.4 G/DL
ALP SERPL-CCNC: 74 U/L (ref 39–117)
ALT SERPL W P-5'-P-CCNC: 14 U/L (ref 1–33)
ANION GAP SERPL CALCULATED.3IONS-SCNC: 10.4 MMOL/L (ref 5–15)
AST SERPL-CCNC: 19 U/L (ref 1–32)
BASOPHILS # BLD AUTO: 0.03 10*3/MM3 (ref 0–0.2)
BASOPHILS NFR BLD AUTO: 0.5 % (ref 0–1.5)
BILIRUB SERPL-MCNC: 0.3 MG/DL (ref 0–1.2)
BUN SERPL-MCNC: 4 MG/DL (ref 6–20)
BUN/CREAT SERPL: 5.3 (ref 7–25)
CALCIUM SPEC-SCNC: 8.6 MG/DL (ref 8.6–10.5)
CHLORIDE SERPL-SCNC: 101 MMOL/L (ref 98–107)
CO2 SERPL-SCNC: 25.6 MMOL/L (ref 22–29)
CREAT SERPL-MCNC: 0.75 MG/DL (ref 0.57–1)
D-LACTATE SERPL-SCNC: 2.7 MMOL/L (ref 0.5–2)
DEPRECATED RDW RBC AUTO: 47.4 FL (ref 37–54)
EGFRCR SERPLBLD CKD-EPI 2021: 97.7 ML/MIN/1.73
EOSINOPHIL # BLD AUTO: 0.01 10*3/MM3 (ref 0–0.4)
EOSINOPHIL NFR BLD AUTO: 0.2 % (ref 0.3–6.2)
ERYTHROCYTE [DISTWIDTH] IN BLOOD BY AUTOMATED COUNT: 13.8 % (ref 12.3–15.4)
FLUAV SUBTYP SPEC NAA+PROBE: NOT DETECTED
FLUBV RNA ISLT QL NAA+PROBE: NOT DETECTED
GLOBULIN UR ELPH-MCNC: 2.8 GM/DL
GLUCOSE SERPL-MCNC: 132 MG/DL (ref 65–99)
HCT VFR BLD AUTO: 39.2 % (ref 34–46.6)
HGB BLD-MCNC: 13 G/DL (ref 12–15.9)
IMM GRANULOCYTES # BLD AUTO: 0.02 10*3/MM3 (ref 0–0.05)
IMM GRANULOCYTES NFR BLD AUTO: 0.3 % (ref 0–0.5)
LYMPHOCYTES # BLD AUTO: 2.05 10*3/MM3 (ref 0.7–3.1)
LYMPHOCYTES NFR BLD AUTO: 31.4 % (ref 19.6–45.3)
MCH RBC QN AUTO: 31 PG (ref 26.6–33)
MCHC RBC AUTO-ENTMCNC: 33.2 G/DL (ref 31.5–35.7)
MCV RBC AUTO: 93.3 FL (ref 79–97)
MONOCYTES # BLD AUTO: 0.4 10*3/MM3 (ref 0.1–0.9)
MONOCYTES NFR BLD AUTO: 6.1 % (ref 5–12)
NEUTROPHILS NFR BLD AUTO: 4.02 10*3/MM3 (ref 1.7–7)
NEUTROPHILS NFR BLD AUTO: 61.5 % (ref 42.7–76)
NRBC BLD AUTO-RTO: 0 /100 WBC (ref 0–0.2)
PLATELET # BLD AUTO: 200 10*3/MM3 (ref 140–450)
PMV BLD AUTO: 10.6 FL (ref 6–12)
POTASSIUM SERPL-SCNC: 3.8 MMOL/L (ref 3.5–5.2)
PROT SERPL-MCNC: 6.6 G/DL (ref 6–8.5)
RBC # BLD AUTO: 4.2 10*6/MM3 (ref 3.77–5.28)
RSV RNA NPH QL NAA+NON-PROBE: NOT DETECTED
S PYO AG THROAT QL: NEGATIVE
SARS-COV-2 RNA RESP QL NAA+PROBE: DETECTED
SODIUM SERPL-SCNC: 137 MMOL/L (ref 136–145)
WBC NRBC COR # BLD AUTO: 6.53 10*3/MM3 (ref 3.4–10.8)

## 2024-08-17 PROCEDURE — 25810000003 SODIUM CHLORIDE 0.9 % SOLUTION

## 2024-08-17 PROCEDURE — 83605 ASSAY OF LACTIC ACID: CPT

## 2024-08-17 PROCEDURE — 99283 EMERGENCY DEPT VISIT LOW MDM: CPT

## 2024-08-17 PROCEDURE — 80053 COMPREHEN METABOLIC PANEL: CPT

## 2024-08-17 PROCEDURE — 87637 SARSCOV2&INF A&B&RSV AMP PRB: CPT

## 2024-08-17 PROCEDURE — 36415 COLL VENOUS BLD VENIPUNCTURE: CPT

## 2024-08-17 PROCEDURE — 85025 COMPLETE CBC W/AUTO DIFF WBC: CPT

## 2024-08-17 PROCEDURE — 71045 X-RAY EXAM CHEST 1 VIEW: CPT

## 2024-08-17 PROCEDURE — 87880 STREP A ASSAY W/OPTIC: CPT

## 2024-08-17 PROCEDURE — 87081 CULTURE SCREEN ONLY: CPT

## 2024-08-17 RX ORDER — ACETAMINOPHEN 500 MG
1000 TABLET ORAL ONCE
Status: COMPLETED | OUTPATIENT
Start: 2024-08-17 | End: 2024-08-17

## 2024-08-17 RX ADMIN — ACETAMINOPHEN 1000 MG: 500 TABLET ORAL at 17:31

## 2024-08-17 RX ADMIN — SODIUM CHLORIDE 1000 ML: 9 INJECTION, SOLUTION INTRAVENOUS at 18:06

## 2024-08-17 NOTE — Clinical Note
[facility] EMERGENCY ROOM  [address] [place] [address]  Phone: [phone]  Fax: [phone]    Geri Bowie was seen and treated in our emergency department on 8/17/2024.  She may return to work on 08/22/2024.         Thank you for choosing [facility].    Hortencia Dodson RN

## 2024-08-17 NOTE — ED PROVIDER NOTES
Time: 4:51 PM EDT  Date of encounter:  8/17/2024  Independent Historian/Clinical History and Information was obtained by:   Patient    History is limited by: N/A    Chief Complaint   Patient presents with    Cough    Generalized Body Aches         History of Present Illness:  Patient is a 49 y.o. year old female who presents to the emergency department for evaluation of flulike symptoms.  Patient states that 2 weeks ago she had a tooth infection and started on antibiotics and then subsequently had a tooth extraction done on Monday (5 days ago).   Patient states that then she started developing dry cough, chills, and bodyaches on Tuesday.  Patient tried taking over-the-counter Motrin and NyQuil without any relief.  Patient states that she had a fever of 101 at home.      Patient Care Team  Primary Care Provider: Nicole Tapia MD    Past Medical History:     Allergies   Allergen Reactions    Amoxicillin Anaphylaxis     Past Medical History:   Diagnosis Date    Abnormal Pap smear of cervix     Cancer     PARTIAL NEPHRECTOMY SURGICAL INTERVENTION ONLY    Cervical dysplasia     Hyperlipidemia     Migraine     Pain in joint, ankle and foot 09/27/2021    Primary localized osteoarthrosis of ankle and foot 04/07/2022    Psoriasis (a type of skin inflammation)     Rotator cuff arthropathy of right shoulder 04/07/2022    Submucous leiomyoma of uterus 07/21/2022     Past Surgical History:   Procedure Laterality Date    ANKLE OPEN REDUCTION INTERNAL FIXATION Right 2016    D & C HYSTEROSCOPY N/A 8/15/2022    Procedure: DILATATION AND CURETTAGE HYSTEROSCOPY;  Surgeon: Chavez Damon MD;  Location: [facility] OR;  Service: Obstetrics/Gynecology;  Laterality: N/A;    NEPHRECTOMY PARTIAL Right 03/2021    Kidney Cancer     Family History   Problem Relation Age of Onset    Malig Hyperthermia Neg Hx     Ovarian cancer Neg Hx     Uterine cancer Neg Hx     Breast cancer Neg Hx     Colon cancer Neg Hx     Deep vein thrombosis Neg Hx      Pulmonary embolism Neg Hx        Home Medications:  Prior to Admission medications    Medication Sig Start Date End Date Taking? Authorizing Provider   azithromycin (Zithromax Z-Nagi) 250 MG tablet Take 2 tablets by mouth on day 1, then 1 tablet daily on days 2-5 7/21/24   Alexandra Ramirez APRN   Guselkumab (Tremfya) 100 MG/ML solution prefilled syringe as directed subcutaneously every 8 weeks    Hollis Gonzalez MD   rosuvastatin (CRESTOR) 20 MG tablet Take 1 tablet by mouth Daily. 7/30/21   Hollis Gonzalez MD   vitamin B-12 (CYANOCOBALAMIN) 1000 MCG tablet  6/21/23   Hollis Gonzalez MD   vitamin D (ERGOCALCIFEROL) 1.25 MG (13728 UT) capsule capsule  11/16/22   Hollis Gonzalez MD        Social History:   Social History     Tobacco Use    Smoking status: Every Day     Current packs/day: 0.50     Average packs/day: 0.5 packs/day for 32.9 years (16.5 ttl pk-yrs)     Types: Cigarettes     Start date: 9/8/1991    Smokeless tobacco: Never    Tobacco comments:     LAST 0600 8/15/22   Vaping Use    Vaping status: Never Used   Substance Use Topics    Alcohol use: Not Currently    Drug use: Never         Review of Systems:  Review of Systems   Constitutional:  Positive for chills and fever.   HENT:  Negative for congestion, rhinorrhea and sore throat.    Eyes:  Negative for pain and visual disturbance.   Respiratory:  Positive for cough. Negative for apnea, chest tightness and shortness of breath.    Cardiovascular:  Negative for chest pain and palpitations.   Gastrointestinal:  Negative for abdominal pain, diarrhea, nausea and vomiting.   Genitourinary:  Negative for difficulty urinating and dysuria.   Musculoskeletal:  Positive for myalgias. Negative for joint swelling.   Skin:  Negative for color change.   Neurological:  Negative for dizziness, seizures and headaches.   Psychiatric/Behavioral: Negative.     All other systems reviewed and are negative.       Physical Exam:  /88    "Pulse 92   Temp 100.2 °F (37.9 °C) (Oral)   Resp 18   Ht 167.6 cm (66\")   Wt 88.8 kg (195 lb 12.3 oz)   SpO2 97%   BMI 31.60 kg/m²         Physical Exam  Vitals and nursing note reviewed.   Constitutional:       General: She is not in acute distress.     Appearance: Normal appearance. She is not toxic-appearing.   HENT:      Head: Normocephalic and atraumatic.      Jaw: There is normal jaw occlusion.   Eyes:      General: Lids are normal.      Extraocular Movements: Extraocular movements intact.      Conjunctiva/sclera: Conjunctivae normal.      Pupils: Pupils are equal, round, and reactive to light.   Cardiovascular:      Rate and Rhythm: Normal rate and regular rhythm.      Pulses: Normal pulses.      Heart sounds: Normal heart sounds.   Pulmonary:      Effort: Pulmonary effort is normal. No respiratory distress.      Breath sounds: Normal breath sounds. No wheezing or rhonchi.   Abdominal:      General: Abdomen is flat.      Palpations: Abdomen is soft.      Tenderness: There is no abdominal tenderness. There is no guarding or rebound.   Musculoskeletal:         General: Normal range of motion.      Cervical back: Normal range of motion and neck supple.      Right lower leg: No edema.      Left lower leg: No edema.   Skin:     General: Skin is warm and dry.   Neurological:      Mental Status: She is alert and oriented to person, place, and time. Mental status is at baseline.   Psychiatric:         Mood and Affect: Mood normal.                      Procedures:  Procedures      Medical Decision Making:      Comorbidities that affect care:    Cancer    External Notes reviewed:          The following orders were placed and all results were independently analyzed by me:  Orders Placed This Encounter   Procedures    COVID-19, FLU A/B, RSV PCR 1 HR TAT - Swab, Nasopharynx    Rapid Strep A Screen - Swab, Throat    Beta Strep Culture, Throat - Swab, Throat    XR Chest 1 View    Comprehensive Metabolic Panel    " Lactic Acid, Plasma    CBC Auto Differential    STAT Lactic Acid, Reflex    CBC & Differential       Medications Given in the Emergency Department:  Medications   acetaminophen (TYLENOL) tablet 1,000 mg (1,000 mg Oral Given 8/17/24 1731)   sodium chloride 0.9 % bolus 1,000 mL (1,000 mL Intravenous New Bag 8/17/24 1806)        ED Course:    The patient was initially evaluated in the triage area where orders were placed. The patient was later dispositioned by Herson Pate PA-C.      The patient was advised to stay for completion of workup which includes but is not limited to communication of labs and radiological results, reassessment and plan. The patient was advised that leaving prior to disposition by a provider could result in critical findings that are not communicated to the patient.     ED Course as of 08/17/24 1832   Sat Aug 17, 2024   1654 PROVIDER IN TRIAGE  Patient was evaluated by me in triage, Bailey Seaver, APRN, [place].  Orders were placed and patient is currently awaiting final results and disposition. [AS]      ED Course User Index  [AS] Seaver, Alyce B, APRN       Labs:    Lab Results (last 24 hours)       Procedure Component Value Units Date/Time    CBC & Differential [[id number]]  (Abnormal) Collected: 08/17/24 1658    Specimen: Blood from Arm, Left Updated: 08/17/24 1708    Narrative:      The following orders were created for panel order CBC & Differential.  Procedure                               Abnormality         Status                     ---------                               -----------         ------                     CBC Auto Differential[[id number]]        Abnormal            Final result                 Please view results for these tests on the individual orders.    Comprehensive Metabolic Panel [[id number]]  (Abnormal) Collected: 08/17/24 1658    Specimen: Blood from Arm, Left Updated: 08/17/24 1729     Glucose 132 mg/dL      BUN 4 mg/dL      Creatinine 0.75 mg/dL      Sodium 137  mmol/L      Potassium 3.8 mmol/L      Chloride 101 mmol/L      CO2 25.6 mmol/L      Calcium 8.6 mg/dL      Total Protein 6.6 g/dL      Albumin 3.8 g/dL      ALT (SGPT) 14 U/L      AST (SGOT) 19 U/L      Alkaline Phosphatase 74 U/L      Total Bilirubin 0.3 mg/dL      Globulin 2.8 gm/dL      A/G Ratio 1.4 g/dL      BUN/Creatinine Ratio 5.3     Anion Gap 10.4 mmol/L      eGFR 97.7 mL/min/1.73     Narrative:      GFR Normal >60  Chronic Kidney Disease <60  Kidney Failure <15      Lactic Acid, Plasma [[id number]]  (Abnormal) Collected: 08/17/24 1658    Specimen: Blood from Arm, Left Updated: 08/17/24 1751     Lactate 2.7 mmol/L     CBC Auto Differential [[id number]]  (Abnormal) Collected: 08/17/24 1658    Specimen: Blood from Arm, Left Updated: 08/17/24 1708     WBC 6.53 10*3/mm3      RBC 4.20 10*6/mm3      Hemoglobin 13.0 g/dL      Hematocrit 39.2 %      MCV 93.3 fL      MCH 31.0 pg      MCHC 33.2 g/dL      RDW 13.8 %      RDW-SD 47.4 fl      MPV 10.6 fL      Platelets 200 10*3/mm3      Neutrophil % 61.5 %      Lymphocyte % 31.4 %      Monocyte % 6.1 %      Eosinophil % 0.2 %      Basophil % 0.5 %      Immature Grans % 0.3 %      Neutrophils, Absolute 4.02 10*3/mm3      Lymphocytes, Absolute 2.05 10*3/mm3      Monocytes, Absolute 0.40 10*3/mm3      Eosinophils, Absolute 0.01 10*3/mm3      Basophils, Absolute 0.03 10*3/mm3      Immature Grans, Absolute 0.02 10*3/mm3      nRBC 0.0 /100 WBC     COVID-19, FLU A/B, RSV PCR 1 HR TAT - Swab, Nasopharynx [[id number]]  (Abnormal) Collected: 08/17/24 1731    Specimen: Swab from Nasopharynx Updated: 08/17/24 1824     COVID19 Detected     Influenza A PCR Not Detected     Influenza B PCR Not Detected     RSV, PCR Not Detected    Narrative:      Fact sheet for providers: https://www.fda.gov/media/130536/download    Fact sheet for patients: https://www.fda.gov/media/033741/download    Test performed by PCR.    Rapid Strep A Screen - Swab, Throat [[id number]]  (Normal) Collected: 08/17/24  1731    Specimen: Swab from Throat Updated: 08/17/24 1758     Strep A Ag Negative    Beta Strep Culture, Throat - Swab, Throat [[id number]] Collected: 08/17/24 1731    Specimen: Swab from Throat Updated: 08/17/24 1758             Imaging:    XR Chest 1 View    Result Date: 8/17/2024  XR CHEST 1 VW Date of Exam: 8/17/2024 5:06 PM EDT Indication: fever Comparison: Chest radiograph 4/23/2024 Findings: Mediastinum: Cardiomediastinal silhouette appears unchanged and normal Lungs: The lungs are clear. Pleura: No pleural effusion or pneumothorax. Bones and soft tissues: No acute osseous abnormality.     Impression: No acute cardiopulmonary abnormality. Electronically Signed: Martínez Godoy  8/17/2024 5:39 PM EDT  Workstation ID: [id number]       Differential Diagnosis and Discussion:      Cough: Differential diagnosis includes but is not limited to pneumonia, acute bronchitis, upper respiratory infection, ACE inhibitor use, allergic reaction, epiglottitis, seasonal allergies, chemical irritants, exercise-induced asthma, viral syndrome.    All labs were reviewed and interpreted by me.  All X-rays impressions were independently interpreted by me.    MDM     Amount and/or Complexity of Data Reviewed  Clinical lab tests: reviewed  Tests in the radiology section of CPT®: reviewed       Patient tested positive for COVID.  The patient´s CBC that was reviewed and interpreted by me shows no abnormalities of critical concern. Of note, there is no anemia requiring a blood transfusion and the platelet count is acceptable.  Strep negative.  Chest x-ray shows no acute cardiopulmonary abnormality.  Initial lactic was 2.7 most likely secondary to patient's decreased oral intake.  We gave patient a liter of fluids and she feels much better at this time.  We gave the patient Tylenol for her temperature.  Patient's oxygen saturation is 97% on room air.  I emphasized the importance of oral hydration.  Instructed patient to alternate  Tylenol/Motrin as needed for fever.  I will write patient off work until Tuesday.  I instructed patient to return to ED if she develops any new or worsening symptoms.  Patient states she understands and agrees with plan of care.          Patient Care Considerations:          Consultants/Shared Management Plan:    None    Social Determinants of Health:    Patient is independent, reliable, and has access to care.       Disposition and Care Coordination:    Discharged: The patient is suitable and stable for discharge with no need for consideration of admission.    I have explained the patient´s condition, diagnoses and treatment plan based on the information available to me at this time. I have answered questions and addressed any concerns. The patient has a good  understanding of the patient´s diagnosis, condition, and treatment plan as can be expected at this point. The vital signs have been stable. The patient´s condition is stable and appropriate for discharge from the emergency department.      The patient will pursue further outpatient evaluation with the primary care physician or other designated or consulting physician as outlined in the discharge instructions. They are agreeable to this plan of care and follow-up instructions have been explained in detail. The patient has received these instructions in written format and has expressed an understanding of the discharge instructions. The patient is aware that any significant change in condition or worsening of symptoms should prompt an immediate return to this or the closest emergency department or call to 911.  I have explained discharge medications and the need for follow up with the patient/caretakers. This was also printed in the discharge instructions. Patient was discharged with the following medications and follow up:      Medication List      No changes were made to your prescriptions during this visit.      Nicole Tapia MD  [address] [facility]  [place]  [facility] [address]  [phone]    Call in 1 day  To schedule follow-up       Final diagnoses:   COVID        ED Disposition       ED Disposition   Discharge    Condition   Stable    Comment   --               This medical record created using voice recognition software.             Herson Pate PA-C  08/17/24 [id number]

## 2024-08-19 LAB — BACTERIA SPEC AEROBE CULT: NORMAL

## 2024-09-06 ENCOUNTER — CONSULT (OUTPATIENT)
Dept: ONCOLOGY | Facility: HOSPITAL | Age: 49
End: 2024-09-06
Payer: OTHER GOVERNMENT

## 2024-09-06 VITALS
DIASTOLIC BLOOD PRESSURE: 76 MMHG | SYSTOLIC BLOOD PRESSURE: 144 MMHG | HEART RATE: 83 BPM | TEMPERATURE: 97.9 F | WEIGHT: 193.12 LBS | RESPIRATION RATE: 18 BRPM | BODY MASS INDEX: 31.04 KG/M2 | OXYGEN SATURATION: 98 % | HEIGHT: 66 IN

## 2024-09-06 DIAGNOSIS — D72.820 LYMPHOCYTOSIS: Primary | ICD-10-CM

## 2024-09-06 RX ORDER — FLUTICASONE PROPIONATE 50 UG/1
SPRAY, METERED NASAL EVERY 24 HOURS
COMMUNITY
Start: 2024-08-05

## 2024-09-06 NOTE — PROGRESS NOTES
Chief Complaint  NEW PT - HEMATOLOGY    Emily Sam MD Movania, Nicole JANSEN MD    Subjective          Geri Bowie presents to [facility] for lymphocytosis and smudge cells seen on peripheral smear.    Patient with elevated lymphocytes back in April of this year.  It was actually also high in April of last year.  Smear was performed that showed smudge cells.  Patient with repeat labs in July and August of this year that showed normal lymphocyte count.  White blood cell count remains normal.  Platelets are normal.  Hemoglobin is normal.  Patient referred by urology.  She has a history of stage I kidney cancer with partial right nephrectomy that was performed In 2021.  She follows with Dr. Terrell for this.  She did have some flank pain back in July that she was seen in the ER for.  CT scan at that time showed no acute abnormality.  No evidence of cancer.  She was seen again in August for fevers and was found to be COVID-positive.  She has recovered from this.  She is at baseline health now.  She denies any fevers or chills or night sweats.    Review of Systems   Musculoskeletal:  Positive for back pain (lower rib cage pain).   All other systems reviewed and are negative.    Current Outpatient Medications on File Prior to Visit   Medication Sig Dispense Refill    azithromycin (Zithromax Z-Nagi) 250 MG tablet Take 2 tablets by mouth on day 1, then 1 tablet daily on days 2-5 6 tablet 0    Guselkumab (Tremfya) 100 MG/ML solution prefilled syringe as directed subcutaneously every 8 weeks      rosuvastatin (CRESTOR) 20 MG tablet Take 1 tablet by mouth Daily.      vitamin B-12 (CYANOCOBALAMIN) 1000 MCG tablet       vitamin D (ERGOCALCIFEROL) 1.25 MG (40035 UT) capsule capsule        No current facility-administered medications on file prior to visit.       Allergies   Allergen Reactions    Amoxicillin Anaphylaxis     Past Medical History:   Diagnosis Date    Abnormal Pap smear  "of cervix     Cancer     PARTIAL NEPHRECTOMY SURGICAL INTERVENTION ONLY    Cervical dysplasia     Hyperlipidemia     Migraine     Pain in joint, ankle and foot 09/27/2021    Primary localized osteoarthrosis of ankle and foot 04/07/2022    Psoriasis (a type of skin inflammation)     Rotator cuff arthropathy of right shoulder 04/07/2022    Submucous leiomyoma of uterus 07/21/2022     Past Surgical History:   Procedure Laterality Date    ANKLE OPEN REDUCTION INTERNAL FIXATION Right 2016    D & C HYSTEROSCOPY N/A 8/15/2022    Procedure: DILATATION AND CURETTAGE HYSTEROSCOPY;  Surgeon: Chavez Damon MD;  Location: [facility] MA[facility] OR;  Service: Obstetrics/Gynecology;  Laterality: N/A;    NEPHRECTOMY PARTIAL Right 03/2021    Kidney Cancer     Social History     Socioeconomic History    Marital status:    Tobacco Use    Smoking status: Every Day     Current packs/day: 0.50     Average packs/day: 0.5 packs/day for 33.0 years (16.5 ttl pk-yrs)     Types: Cigarettes     Start date: 9/8/1991    Smokeless tobacco: Never    Tobacco comments:     LAST 0600 8/15/22   Vaping Use    Vaping status: Never Used   Substance and Sexual Activity    Alcohol use: Not Currently    Drug use: Never    Sexual activity: Defer     Family History   Problem Relation Age of Onset    Malig Hyperthermia Neg Hx     Ovarian cancer Neg Hx     Uterine cancer Neg Hx     Breast cancer Neg Hx     Colon cancer Neg Hx     Deep vein thrombosis Neg Hx     Pulmonary embolism Neg Hx        Objective   Physical Exam  Well appearing patient in no acute distress on RA  Anicteric sclerae, no rash on exposed skin  Respirations non-labored  Awake, alert, and oriented x 4. Speech intact. No gross neurologic deficit  Appropriate mood and affect    Vitals:    09/06/24 1520   BP: 144/76   Pulse: 83   Resp: 18   Temp: 97.9 °F (36.6 °C)   TempSrc: Temporal   SpO2: 98%   Weight: 87.6 kg (193 lb 2 oz)   Height: 167.6 cm (65.98\")   PainSc:   8               PHQ-9 Total " Score:                      Result Review :   The following data was reviewed by: Hitesh Christopher MD on 09/06/24:  Lab Results   Component Value Date    HGB 13.0 08/17/2024    HCT 39.2 08/17/2024    MCV 93.3 08/17/2024     08/17/2024    WBC 6.53 08/17/2024    NEUTROABS 4.02 08/17/2024    LYMPHSABS 2.05 08/17/2024    MONOSABS 0.40 08/17/2024    EOSABS 0.01 08/17/2024    BASOSABS 0.03 08/17/2024     Lab Results   Component Value Date    GLUCOSE 132 (H) 08/17/2024    BUN 4 (L) 08/17/2024    CREATININE 0.75 08/17/2024     08/17/2024    K 3.8 08/17/2024     08/17/2024    CO2 25.6 08/17/2024    CALCIUM 8.6 08/17/2024    PROTEINTOT 6.6 08/17/2024    ALBUMIN 3.8 08/17/2024    BILITOT 0.3 08/17/2024    ALKPHOS 74 08/17/2024    AST 19 08/17/2024    ALT 14 08/17/2024     Lab Results   Component Value Date    MG 2.2 05/19/2022     Labs personally reviewed.  CBC essentially completely normal.    ED note personally reviewed.    Urology note personally reviewed           XR Chest 1 View    Result Date: 8/17/2024  Impression: No acute cardiopulmonary abnormality. Electronically Signed: Martínez Godoy  8/17/2024 5:39 PM EDT  Workstation ID: [id number]         Assessment and Plan    Diagnoses and all orders for this visit:    1. Lymphocytosis (Primary)      Lymphocytosis without leukocytosis.  Present from April 2023 until April 2024.  CBC July 2024 and in August 2024 without any lymphocytosis.  White count has remained normal entire time.  No evidence of abnormalities with platelet counts and no anemia. Smear added in April did show smudge cells but this could be related to a reactive process.  Patient at baseline health and asymptomatic currently. Certainly the lymphocytes could have been related to infection and or reactive to something else.  No current indication for flow cytometry or further testing. Recommend yearly CBC checks with PCP.  Patient can be referred back here if another concern arises.   Patient agreeable with plan.        Patient Follow Up: PRN  Patient was given instructions and counseling regarding her condition or for health maintenance advice. Please see specific information pulled into the AVS if appropriate.

## 2024-10-17 ENCOUNTER — PREP FOR SURGERY (OUTPATIENT)
Dept: OTHER | Facility: HOSPITAL | Age: 49
End: 2024-10-17
Payer: OTHER GOVERNMENT

## 2024-10-17 ENCOUNTER — CLINICAL SUPPORT (OUTPATIENT)
Dept: GASTROENTEROLOGY | Facility: CLINIC | Age: 49
End: 2024-10-17
Payer: OTHER GOVERNMENT

## 2024-10-17 DIAGNOSIS — Z12.11 COLON CANCER SCREENING: Primary | ICD-10-CM

## 2024-10-17 RX ORDER — SODIUM PICOSULFATE, MAGNESIUM OXIDE, AND ANHYDROUS CITRIC ACID 12; 3.5; 1 G/175ML; G/175ML; MG/175ML
175 LIQUID ORAL TAKE AS DIRECTED
Qty: 350 ML | Refills: 0 | Status: SHIPPED | OUTPATIENT
Start: 2024-10-17

## 2024-10-17 RX ORDER — GUSELKUMAB 100 MG/ML
INJECTION SUBCUTANEOUS
COMMUNITY
Start: 2024-10-10

## 2024-10-17 NOTE — PROGRESS NOTES
Geri Bowie  1975  49 y.o.    Reason for call: Screening Colonoscopy- LAST COLON 8/2011 K[facility]  Prep prescribed: Clenpiq  Prep instructions reviewed with patient and sent to patient via [employer]  Is the patient currently on any injectable or oral medications for weight loss or diabetes? No  Clearance needed? No  If yes, what clearance is needed? N/A  Clearance has been requested from N/A  The patient has been scheduled for: Colonoscopy    Geri Bowie is aware they have been scheduled for a screening colonoscopy. Patient has expressed they are not having any symptoms at all.     After your procedure, you will be contacted with results. Please confirm the best phone # to reach the patient: [phone]  Family history of colon cancer? No  If yes, indicate relative: N/A  Tentative Procedure Date: 12/11/2024    Family History   Problem Relation Age of Onset    Malig Hyperthermia Neg Hx     Ovarian cancer Neg Hx     Uterine cancer Neg Hx     Breast cancer Neg Hx     Colon cancer Neg Hx     Deep vein thrombosis Neg Hx     Pulmonary embolism Neg Hx      Past Medical History:   Diagnosis Date    Abnormal Pap smear of cervix     Cancer     PARTIAL NEPHRECTOMY SURGICAL INTERVENTION ONLY    Cervical dysplasia     Colon polyp     Don’t remember    Hyperlipidemia     Lactose intolerance 1999    Migraine     Pain in joint, ankle and foot 09/27/2021    Primary localized osteoarthrosis of ankle and foot 04/07/2022    Psoriasis (a type of skin inflammation)     Rotator cuff arthropathy of right shoulder 04/07/2022    Submucous leiomyoma of uterus 07/21/2022     Allergies   Allergen Reactions    Amoxicillin Anaphylaxis     Past Surgical History:   Procedure Laterality Date    ANKLE OPEN REDUCTION INTERNAL FIXATION Right 2016    APPENDECTOMY  2006    dont remember the excat date    COLONOSCOPY      7 years ago    D & C HYSTEROSCOPY N/A 08/15/2022    Procedure: DILATATION AND CURETTAGE HYSTEROSCOPY;  Surgeon: Chavez Damon,  MD;  Location: [facility] MAIN OR;  Service: Obstetrics/Gynecology;  Laterality: N/A;    NEPHRECTOMY PARTIAL Right 03/2021    Kidney Cancer     Social History     Socioeconomic History    Marital status:    Tobacco Use    Smoking status: Every Day     Current packs/day: 0.50     Average packs/day: 0.5 packs/day for 33.1 years (16.6 ttl pk-yrs)     Types: Cigarettes     Start date: 9/8/1991     Passive exposure: Past    Smokeless tobacco: Never    Tobacco comments:     LAST 0600 8/15/22   Vaping Use    Vaping status: Never Used   Substance and Sexual Activity    Alcohol use: Not Currently    Drug use: Never    Sexual activity: Not Currently     Partners: Male     Birth control/protection: None       Current Outpatient Medications:     Flonase Allergy Relief 50 MCG/ACT nasal spray, Daily., Disp: , Rfl:     rosuvastatin (CRESTOR) 20 MG tablet, Take 1 tablet by mouth Daily., Disp: , Rfl:     Tremfya 100 MG/ML solution pen-injector, , Disp: , Rfl:     vitamin B-12 (CYANOCOBALAMIN) 1000 MCG tablet, , Disp: , Rfl:     vitamin D (ERGOCALCIFEROL) 1.25 MG (86374 UT) capsule capsule, , Disp: , Rfl:

## 2025-01-29 ENCOUNTER — TELEPHONE (OUTPATIENT)
Dept: GASTROENTEROLOGY | Facility: CLINIC | Age: 50
End: 2025-01-29
Payer: OTHER GOVERNMENT

## 2025-01-29 DIAGNOSIS — Z12.11 COLON CANCER SCREENING: Primary | ICD-10-CM

## 2025-01-29 RX ORDER — SODIUM PICOSULFATE, MAGNESIUM OXIDE, AND ANHYDROUS CITRIC ACID 12; 3.5; 1 G/175ML; G/175ML; MG/175ML
175 LIQUID ORAL TAKE AS DIRECTED
Qty: 350 ML | Refills: 0 | Status: SHIPPED | OUTPATIENT
Start: 2025-01-29

## 2025-01-29 NOTE — TELEPHONE ENCOUNTER
Received call from patient stating bowel prep needed to be sent to [facility] instead of [facility] -Routed info to MA for follow up.

## 2025-02-04 ENCOUNTER — ANESTHESIA EVENT (OUTPATIENT)
Dept: GASTROENTEROLOGY | Facility: HOSPITAL | Age: 50
End: 2025-02-04
Payer: OTHER GOVERNMENT

## 2025-02-04 NOTE — ANESTHESIA PREPROCEDURE EVALUATION
Anesthesia Evaluation     Patient summary reviewed and Nursing notes reviewed   NPO Solid Status: > 8 hours  NPO Liquid Status: > 4 hours           Airway   Mallampati: II  TM distance: <3 FB  Neck ROM: full  No difficulty expected  Dental - normal exam     Pulmonary     breath sounds clear to auscultation  (+) a smoker (current) Current, Smoked day of surgery, cigarettes,sleep apnea (no official dx)  Cardiovascular - normal exam  Exercise tolerance: good (4-7 METS)    ECG reviewed  Rhythm: regular  Rate: normal    (+) hyperlipidemia      Neuro/Psych  (+) headaches  GI/Hepatic/Renal/Endo    (+) obesity, renal disease (s/p partial nephrectomy)-    Musculoskeletal     Abdominal    Substance History      OB/GYN          Other   arthritis,   history of cancer (remission) remission    ROS/Med Hx Other: Screening    EKG 05/19/22: HR 50,   Sinus bradycardia  Borderline left axis deviation  Nonspecific T abnormalities, inferior leads    HCG pending                   Anesthesia Plan    ASA 3     general   total IV anesthesia  (Total IV Anesthesia      Discussed risks with pt including aspiration, allergic reactions, apnea, advanced airway placement. Pt verbalized understanding. All questions answered.     Patient understands anesthesia not responsible for dental damage.  )  intravenous induction     Anesthetic plan, risks, benefits, and alternatives have been provided, discussed and informed consent has been obtained with: patient.  Pre-procedure education provided  Plan discussed with CRNA.      CODE STATUS:

## 2025-02-05 ENCOUNTER — HOSPITAL ENCOUNTER (OUTPATIENT)
Facility: HOSPITAL | Age: 50
Setting detail: HOSPITAL OUTPATIENT SURGERY
Discharge: HOME | End: 2025-02-05
Attending: INTERNAL MEDICINE | Admitting: INTERNAL MEDICINE
Payer: OTHER GOVERNMENT

## 2025-02-05 ENCOUNTER — ANESTHESIA (OUTPATIENT)
Dept: GASTROENTEROLOGY | Facility: HOSPITAL | Age: 50
End: 2025-02-05
Payer: OTHER GOVERNMENT

## 2025-02-05 VITALS
OXYGEN SATURATION: 95 % | HEART RATE: 85 BPM | BODY MASS INDEX: 32.17 KG/M2 | WEIGHT: 200.18 LBS | SYSTOLIC BLOOD PRESSURE: 115 MMHG | HEIGHT: 66 IN | RESPIRATION RATE: 16 BRPM | DIASTOLIC BLOOD PRESSURE: 70 MMHG | TEMPERATURE: 97 F

## 2025-02-05 DIAGNOSIS — Z12.11 COLON CANCER SCREENING: ICD-10-CM

## 2025-02-05 LAB — B-HCG UR QL: NEGATIVE

## 2025-02-05 PROCEDURE — 25010000002 LIDOCAINE PF 2% 2 % SOLUTION: Performed by: NURSE ANESTHETIST, CERTIFIED REGISTERED

## 2025-02-05 PROCEDURE — 88305 TISSUE EXAM BY PATHOLOGIST: CPT | Performed by: INTERNAL MEDICINE

## 2025-02-05 PROCEDURE — 25810000003 LACTATED RINGERS PER 1000 ML

## 2025-02-05 PROCEDURE — 81025 URINE PREGNANCY TEST: CPT | Performed by: INTERNAL MEDICINE

## 2025-02-05 PROCEDURE — 25010000002 PROPOFOL 10 MG/ML EMULSION: Performed by: NURSE ANESTHETIST, CERTIFIED REGISTERED

## 2025-02-05 RX ORDER — SODIUM CHLORIDE, SODIUM LACTATE, POTASSIUM CHLORIDE, CALCIUM CHLORIDE 600; 310; 30; 20 MG/100ML; MG/100ML; MG/100ML; MG/100ML
30 INJECTION, SOLUTION INTRAVENOUS CONTINUOUS
Status: DISCONTINUED | OUTPATIENT
Start: 2025-02-05 | End: 2025-02-05 | Stop reason: HOSPADM

## 2025-02-05 RX ORDER — PROPOFOL 10 MG/ML
VIAL (ML) INTRAVENOUS AS NEEDED
Status: DISCONTINUED | OUTPATIENT
Start: 2025-02-05 | End: 2025-02-05 | Stop reason: SURG

## 2025-02-05 RX ORDER — LIDOCAINE HYDROCHLORIDE 20 MG/ML
INJECTION, SOLUTION EPIDURAL; INFILTRATION; INTRACAUDAL; PERINEURAL AS NEEDED
Status: DISCONTINUED | OUTPATIENT
Start: 2025-02-05 | End: 2025-02-05 | Stop reason: SURG

## 2025-02-05 RX ADMIN — PROPOFOL 175 MCG/KG/MIN: 10 INJECTION, EMULSION INTRAVENOUS at 08:25

## 2025-02-05 RX ADMIN — LIDOCAINE HYDROCHLORIDE 50 MG: 20 INJECTION, SOLUTION EPIDURAL; INFILTRATION; INTRACAUDAL; PERINEURAL at 08:25

## 2025-02-05 RX ADMIN — PROPOFOL 100 MG: 10 INJECTION, EMULSION INTRAVENOUS at 08:25

## 2025-02-05 RX ADMIN — SODIUM CHLORIDE, POTASSIUM CHLORIDE, SODIUM LACTATE AND CALCIUM CHLORIDE: 600; 310; 30; 20 INJECTION, SOLUTION INTRAVENOUS at 08:21

## 2025-02-05 NOTE — ANESTHESIA POSTPROCEDURE EVALUATION
Patient: Geri Bowie    Procedure Summary       Date: 02/05/25 Room / Location: [facility] ENDOSCOPY 2 / [facility] ENDOSCOPY    Anesthesia Start: 0821 Anesthesia Stop: 0847    Procedure: COLONOSCOPY WITH HOT SNARE POLYPECTOMY Diagnosis:       Colon cancer screening      (Colon cancer screening [Z12.11])    Surgeons: Michel Stephens MD Provider: Rajinder Johansen CRNA    Anesthesia Type: general ASA Status: 3            Anesthesia Type: general    Vitals  Vitals Value Taken Time   /70 02/05/25 0902   Temp 36.1 °C (97 °F) 02/05/25 0901   Pulse 79 02/05/25 0903   Resp 16 02/05/25 0901   SpO2 96 % 02/05/25 0903   Vitals shown include unfiled device data.        Post Anesthesia Care and Evaluation    Patient location during evaluation: bedside  Patient participation: complete - patient participated  Level of consciousness: awake  Pain management: adequate    Airway patency: patent  Anesthetic complications: No anesthetic complications  PONV Status: controlled  Cardiovascular status: acceptable and stable  Respiratory status: acceptable

## 2025-02-05 NOTE — H&P
ScreeningPre Procedure History & Physical    Chief Complaint:   Screening     Subjective     HPI:   Screening     Past Medical History:   Past Medical History:   Diagnosis Date    Abnormal Pap smear of cervix     Cancer     PARTIAL NEPHRECTOMY SURGICAL INTERVENTION ONLY    Cervical dysplasia     Colon polyp     Don’t remember    Hyperlipidemia     Lactose intolerance 1999    Migraine     Pain in joint, ankle and foot 09/27/2021    Primary localized osteoarthrosis of ankle and foot 04/07/2022    Psoriasis (a type of skin inflammation)     Rotator cuff arthropathy of right shoulder 04/07/2022    Submucous leiomyoma of uterus 07/21/2022       Past Surgical History:  Past Surgical History:   Procedure Laterality Date    ANKLE OPEN REDUCTION INTERNAL FIXATION Right 2016    APPENDECTOMY  2006    dont remember the excat date    COLONOSCOPY      7 years ago    D & C HYSTEROSCOPY N/A 08/15/2022    Procedure: DILATATION AND CURETTAGE HYSTEROSCOPY;  Surgeon: Chavez Damon MD;  Location: [facility] MAIN OR;  Service: Obstetrics/Gynecology;  Laterality: N/A;    NEPHRECTOMY PARTIAL Right 03/2021    Kidney Cancer       Family History:  Family History   Problem Relation Age of Onset    Malig Hyperthermia Neg Hx     Ovarian cancer Neg Hx     Uterine cancer Neg Hx     Breast cancer Neg Hx     Colon cancer Neg Hx     Deep vein thrombosis Neg Hx     Pulmonary embolism Neg Hx        Social History:   reports that she has been smoking cigarettes. She started smoking about 33 years ago. She has a 16.7 pack-year smoking history. She has been exposed to tobacco smoke. She has never used smokeless tobacco. She reports that she does not currently use alcohol. She reports that she does not use drugs.    Medications:   Medications Prior to Admission   Medication Sig Dispense Refill Last Dose/Taking    Flonase Allergy Relief 50 MCG/ACT nasal spray Daily.   2/4/2025    rosuvastatin (CRESTOR) 20 MG tablet Take 1 tablet by mouth Daily.   2/4/2025     "Tremfya 100 MG/ML solution pen-injector    2/4/2025    vitamin B-12 (CYANOCOBALAMIN) 1000 MCG tablet    2/4/2025    vitamin D (ERGOCALCIFEROL) 1.25 MG (56230 UT) capsule capsule    2/4/2025       Allergies:  Amoxicillin        Objective     Blood pressure 117/70, pulse 72, temperature 97.9 °F (36.6 °C), temperature source Temporal, resp. rate 20, height 167.6 cm (66\"), weight 90.8 kg (200 lb 2.8 oz), SpO2 95%, not currently breastfeeding.    Physical Exam   Constitutional: Pt is oriented to person, place, and time and well-developed, well-nourished, and in no distress.   Mouth/Throat: Oropharynx is clear and moist.   Neck: Normal range of motion.   Cardiovascular: Normal rate, regular rhythm and normal heart sounds.    Pulmonary/Chest: Effort normal and breath sounds normal.   Abdominal: Soft. Nontender  Skin: Skin is warm and dry.   Psychiatric: Mood, memory, affect and judgment normal.     Assessment & Plan     Diagnosis:  Screening colonoscopy      Anticipated Surgical Procedure:  colonoscopy    The risks, benefits, and alternatives of this procedure have been discussed with the patient or the responsible party- the patient understands and agrees to proceed.            "

## 2025-03-13 ENCOUNTER — TELEPHONE (OUTPATIENT)
Dept: OBSTETRICS AND GYNECOLOGY | Facility: CLINIC | Age: 50
End: 2025-03-13
Payer: OTHER GOVERNMENT

## 2025-03-13 NOTE — TELEPHONE ENCOUNTER
Spoke with patient regarding rescheduling her hysterectomy.  Patient wishes to return to office to speak with provider regarding options other than hysterectomy.  Appt made.

## 2025-04-25 ENCOUNTER — HOSPITAL ENCOUNTER (OUTPATIENT)
Dept: GENERAL RADIOLOGY | Facility: HOSPITAL | Age: 50
Discharge: HOME | End: 2025-04-25
Payer: OTHER GOVERNMENT

## 2025-04-25 ENCOUNTER — TELEPHONE (OUTPATIENT)
Dept: UROLOGY | Age: 50
End: 2025-04-25
Payer: OTHER GOVERNMENT

## 2025-04-25 ENCOUNTER — LAB (OUTPATIENT)
Dept: LAB | Facility: HOSPITAL | Age: 50
End: 2025-04-25
Payer: OTHER GOVERNMENT

## 2025-04-25 ENCOUNTER — HOSPITAL ENCOUNTER (OUTPATIENT)
Dept: CT IMAGING | Facility: HOSPITAL | Age: 50
Discharge: HOME | End: 2025-04-25
Payer: OTHER GOVERNMENT

## 2025-04-25 DIAGNOSIS — C64.9 RENAL CELL CARCINOMA, UNSPECIFIED LATERALITY: ICD-10-CM

## 2025-04-25 LAB
ANION GAP SERPL CALCULATED.3IONS-SCNC: 10.6 MMOL/L (ref 5–15)
BACTERIA UR QL AUTO: ABNORMAL /HPF
BILIRUB UR QL STRIP: NEGATIVE
BUN SERPL-MCNC: 8 MG/DL (ref 6–20)
BUN/CREAT SERPL: 11.4 (ref 7–25)
CALCIUM SPEC-SCNC: 9.2 MG/DL (ref 8.6–10.5)
CHLORIDE SERPL-SCNC: 102 MMOL/L (ref 98–107)
CLARITY UR: CLEAR
CO2 SERPL-SCNC: 25.4 MMOL/L (ref 22–29)
COLOR UR: YELLOW
CREAT BLDA-MCNC: 0.7 MG/DL (ref 0.6–1.3)
CREAT SERPL-MCNC: 0.7 MG/DL (ref 0.57–1)
DEPRECATED RDW RBC AUTO: 44.1 FL (ref 37–54)
EGFRCR SERPLBLD CKD-EPI 2021: 105.5 ML/MIN/1.73
EGFRCR SERPLBLD CKD-EPI 2021: 105.5 ML/MIN/1.73
ERYTHROCYTE [DISTWIDTH] IN BLOOD BY AUTOMATED COUNT: 12.8 % (ref 12.3–15.4)
GLUCOSE SERPL-MCNC: 94 MG/DL (ref 65–99)
GLUCOSE UR STRIP-MCNC: NEGATIVE MG/DL
HCT VFR BLD AUTO: 40.9 % (ref 34–46.6)
HGB BLD-MCNC: 13.9 G/DL (ref 12–15.9)
HGB UR QL STRIP.AUTO: ABNORMAL
HYALINE CASTS UR QL AUTO: ABNORMAL /LPF
KETONES UR QL STRIP: NEGATIVE
LEUKOCYTE ESTERASE UR QL STRIP.AUTO: NEGATIVE
MCH RBC QN AUTO: 32.5 PG (ref 26.6–33)
MCHC RBC AUTO-ENTMCNC: 34 G/DL (ref 31.5–35.7)
MCV RBC AUTO: 95.6 FL (ref 79–97)
NITRITE UR QL STRIP: NEGATIVE
PH UR STRIP.AUTO: 7 [PH] (ref 5–8)
PLATELET # BLD AUTO: 290 10*3/MM3 (ref 140–450)
PMV BLD AUTO: 11.5 FL (ref 6–12)
POTASSIUM SERPL-SCNC: 3.7 MMOL/L (ref 3.5–5.2)
PROT UR QL STRIP: NEGATIVE
RBC # BLD AUTO: 4.28 10*6/MM3 (ref 3.77–5.28)
RBC # UR STRIP: ABNORMAL /HPF
REF LAB TEST METHOD: ABNORMAL
SODIUM SERPL-SCNC: 138 MMOL/L (ref 136–145)
SP GR UR STRIP: 1.01 (ref 1–1.03)
SQUAMOUS #/AREA URNS HPF: ABNORMAL /HPF
UROBILINOGEN UR QL STRIP: ABNORMAL
WBC # UR STRIP: ABNORMAL /HPF
WBC NRBC COR # BLD AUTO: 9.55 10*3/MM3 (ref 3.4–10.8)

## 2025-04-25 PROCEDURE — 80048 BASIC METABOLIC PNL TOTAL CA: CPT

## 2025-04-25 PROCEDURE — 71046 X-RAY EXAM CHEST 2 VIEWS: CPT

## 2025-04-25 PROCEDURE — 25510000001 IOPAMIDOL PER 1 ML: Performed by: UROLOGY

## 2025-04-25 PROCEDURE — 82565 ASSAY OF CREATININE: CPT

## 2025-04-25 PROCEDURE — 36415 COLL VENOUS BLD VENIPUNCTURE: CPT

## 2025-04-25 PROCEDURE — 74170 CT ABD WO CNTRST FLWD CNTRST: CPT

## 2025-04-25 PROCEDURE — 85027 COMPLETE CBC AUTOMATED: CPT

## 2025-04-25 PROCEDURE — 81001 URINALYSIS AUTO W/SCOPE: CPT

## 2025-04-25 RX ORDER — IOPAMIDOL 755 MG/ML
100 INJECTION, SOLUTION INTRAVASCULAR
Status: COMPLETED | OUTPATIENT
Start: 2025-04-25 | End: 2025-04-25

## 2025-04-25 RX ADMIN — IOPAMIDOL 100 ML: 755 INJECTION, SOLUTION INTRAVENOUS at 17:22

## 2025-04-25 NOTE — TELEPHONE ENCOUNTER
Call made to pt to see if able to get cxr and labs along with CT today; pt states she was unaware she needed those; RN read last progress note and along w/ CT those are also needed; Pt asks if orders are in and RN states they are and will  after today; RN states when she arrives for CT to ask if she can get CXR and labs done at the same time; labs are non-fasting; and if she has issues with the order date to call and we can re-input those orders; pt is agreeable and understanding at this time with no further questions.

## 2025-04-29 RX ORDER — GUSELKUMAB 100 MG/ML
INJECTION SUBCUTANEOUS
COMMUNITY
Start: 2025-04-18

## 2025-04-29 RX ORDER — TRAMADOL HYDROCHLORIDE 50 MG/1
TABLET ORAL
COMMUNITY
Start: 2025-03-06

## 2025-04-29 RX ORDER — GABAPENTIN 300 MG/1
CAPSULE ORAL
COMMUNITY
Start: 2025-03-18

## 2025-04-29 RX ORDER — NAPROXEN 500 MG/1
TABLET ORAL
COMMUNITY
Start: 2025-02-03

## 2025-05-01 ENCOUNTER — OFFICE VISIT (OUTPATIENT)
Dept: UROLOGY | Age: 50
End: 2025-05-01
Payer: OTHER GOVERNMENT

## 2025-05-01 VITALS — BODY MASS INDEX: 32.17 KG/M2 | WEIGHT: 200.18 LBS | RESPIRATION RATE: 14 BRPM | HEIGHT: 66 IN

## 2025-05-01 DIAGNOSIS — C64.9 RENAL CELL CARCINOMA, UNSPECIFIED LATERALITY: Primary | ICD-10-CM

## 2025-05-01 DIAGNOSIS — N20.0 NEPHROLITHIASIS: ICD-10-CM

## 2025-05-01 NOTE — PROGRESS NOTES
UROLOGY OFFICE FOLLOW UP NOTE    Subjective   HPI  Geri Bowie is a 50 y.o. female.  Presents for follow-up renal cell carcinoma and history of nephrolithiasis requiring intervention and right partial robotic nephrectomy, 3/2021.CT scan, chest x-ray and labs prior.    History of Present Illness  Planes of left-sided pain.  She has been experiencing persistent left-sided pain for approximately one year, which prompted an emergency room visit and subsequent CT scan. Despite this, the pain has persisted, leading her to seek further medical attention. An x-ray was performed, yielding no significant findings.   A partial MRI of her back was also conducted, revealing nerve damage. She was prescribed gabapentin but has not yet started the medication.     A recent review of her CT scan report indicated the presence of a kidney stone, which she suspects may be the cause of her pain.   The pain is described as severe, with periods of daily occurrence interspersed with days of relief.     She has undergone bilateral stone treatment in 2021 and a right partial nephrectomy.     She is uncertain about the origin of her current pain, which she describes as being located on her side, near her ribs. She has undergone extensive imaging, including x-rays, MRIs, and two CT scans, but the cause of her pain remains undetermined. Her physician has suggested a musculoskeletal origin for the pain and gabapentin has been prescribed.        ________________  Right partial nephrectomy pathology, 3/23/2021: Clear-cell renal cell carcinoma, pT1a, (1.7 cm) margin negative    CT abdomen with and without contrast 4/25/2025: Multiple hepatic cyst; 3 mm left intrarenal calculus; postoperative changes of partial right nephrectomy; no abnormal enhancing soft tissue at right kidney surgical site; no enhancing renal lesion bilaterally; tiny left renal cyst; no suspicious filling defect; no evidence of abdominal metastatic disease     CT abdomen with  "and without 4/9/2024: Postoperative changes anterior mid to lower right kidney from previous  partial nephrectomy. No suspicious enhancement or other findings. No  abnormal adenopathy is seen.     Punctate nonobstructing left-sided nephrolith new compared to prior CT.     Chest x-ray   4/25/2025: No acute findings  4/23/2024: No acute cardiopulmonary disease; no evidence of metastatic disease  4/18/2023 no acute cardiopulmonary process     CT abdomen pelvis with and without contrast 4/7/2023: No renal or ureteral stone seen.  No hydronephrosis.  Surgical changes anterior pole right kidney stable.     UA,   4/25/2025: Negative proteinuria  4/23/2024: 30+ protein; negative for infection or hematuria  4/18/2023: Suspicious for infection with 4+ bacteria, WBCs, 3-5 RBCs, 7-12 squamous     CBC   4/25/25: Unremarkable   4/20 3/2024: Unremarkable  4/18/2023, unremarkable     Creatinine,   4/25/2025: 0.7  4/23/2024: 0.72  4/18/2023: 0.64      Stone analysis, 1/11/2021: 100% calcium oxalate    Results for orders placed or performed during the hospital encounter of 04/25/25   POC Creatinine    Collection Time: 04/25/25  4:58 PM    Specimen: Blood   Result Value Ref Range    Creatinine 0.70 0.60 - 1.30 mg/dL    eGFR 105.5 >60.0 mL/min/1.73         Review of systems  A review of systems was performed, and positive findings are noted in the HPI.    Objective     Vital Signs:   Resp 14   Ht 167.6 cm (66\")   Wt 90.8 kg (200 lb 2.8 oz)   BMI 32.31 kg/m²       Physical exam  No acute distress, well-nourished  Awake alert and oriented  Mood normal; affect normal  Physical Exam      Problem List:  Patient Active Problem List   Diagnosis    Breast lump    Hematochezia    High cholesterol    Pain of breast    Psoriasis    Abnormal glucose level    Dysmenorrhea    Malignant tumor of kidney    Obesity    Obstructive sleep apnea syndrome    Seasonal allergic rhinitis    Vitamin B12 deficiency    Vitamin D deficiency    Menorrhagia with " regular cycle    Colon cancer screening       Assessment & Plan   Diagnoses and all orders for this visit:    1. Renal cell carcinoma, unspecified laterality (Primary)  -     CT Abdomen With & Without Contrast; Future  -     Basic Metabolic Panel; Future  -     CBC (No Diff); Future  -     Urinalysis With Microscopic - Urine, Clean Catch; Future  -     XR Chest 2 View; Future    2. Nephrolithiasis      Doing well  Encouraged ample hydration.    Provided reassurance; CT scan imaging personally reviewed by me, demonstrate discussed with patient at length.  Do not believe her left-sided complaints to be of urologic etiology.  Nonobstructing small left intrarenal stone; discussed natural history of intrarenal stones; continue expectant management.  No intervention of left renal cyst which is tiny.     pT1a renal cell carcinoma - Labs and imaging reviewed, no evidence of current or metastatic disease.     Plan for surveillance studies again in 1 year per AUA and NCCN guidelines        Follow-up 1 year studies prior or earlier as needed  All question addressed  Assessment & Plan             Patient or patient representative verbalized consent for the use of Ambient Listening during the visit with  Emily Sam MD for chart documentation. 5/2/2025  17:38 EDT

## 2025-08-26 ENCOUNTER — HOSPITAL ENCOUNTER (EMERGENCY)
Facility: HOSPITAL | Age: 50
Discharge: HOME | End: 2025-08-26
Attending: EMERGENCY MEDICINE | Admitting: EMERGENCY MEDICINE
Payer: OTHER GOVERNMENT

## 2025-08-26 ENCOUNTER — APPOINTMENT (OUTPATIENT)
Dept: GENERAL RADIOLOGY | Facility: HOSPITAL | Age: 50
End: 2025-08-26
Payer: OTHER GOVERNMENT

## 2025-08-26 VITALS
OXYGEN SATURATION: 99 % | HEIGHT: 66 IN | HEART RATE: 75 BPM | TEMPERATURE: 98.1 F | SYSTOLIC BLOOD PRESSURE: 142 MMHG | WEIGHT: 212.08 LBS | DIASTOLIC BLOOD PRESSURE: 48 MMHG | RESPIRATION RATE: 18 BRPM | BODY MASS INDEX: 34.08 KG/M2

## 2025-08-26 DIAGNOSIS — B34.9 VIRAL INFECTION: Primary | ICD-10-CM

## 2025-08-26 LAB
FLUAV RNA RESP QL NAA+PROBE: NOT DETECTED
FLUBV RNA NPH QL NAA+NON-PROBE: NOT DETECTED
RSV RNA RESP QL NAA+PROBE: NOT DETECTED
S PYO AG THROAT QL: NEGATIVE
SARS-COV-2 RNA RESP QL NAA+PROBE: NOT DETECTED

## 2025-08-26 PROCEDURE — 87637 SARSCOV2&INF A&B&RSV AMP PRB: CPT | Performed by: EMERGENCY MEDICINE

## 2025-08-26 PROCEDURE — 87880 STREP A ASSAY W/OPTIC: CPT | Performed by: EMERGENCY MEDICINE

## 2025-08-26 PROCEDURE — 71046 X-RAY EXAM CHEST 2 VIEWS: CPT

## 2025-08-26 PROCEDURE — 99283 EMERGENCY DEPT VISIT LOW MDM: CPT

## 2025-08-26 PROCEDURE — 87081 CULTURE SCREEN ONLY: CPT | Performed by: EMERGENCY MEDICINE

## 2025-08-26 RX ORDER — CHLORCYCLIZINE HYDROCHLORIDE AND PSEUDOEPHEDRINE HYDROCHLORIDE 25; 60 MG/1; MG/1
1 TABLET ORAL 3 TIMES DAILY
COMMUNITY
Start: 2025-08-22

## 2025-08-26 RX ORDER — DOXYCYCLINE 100 MG/1
1 CAPSULE ORAL EVERY 12 HOURS SCHEDULED
COMMUNITY
Start: 2025-08-22

## 2025-08-26 RX ORDER — RIZATRIPTAN BENZOATE 10 MG/1
10 TABLET, ORALLY DISINTEGRATING ORAL EVERY 24 HOURS
COMMUNITY
Start: 2025-08-06

## 2025-08-28 LAB — BACTERIA SPEC AEROBE CULT: NORMAL

## (undated) DEVICE — CONN JET HYDRA H20 AUXILIARY DISP

## (undated) DEVICE — DEV TISS REMOV MYOSURE REACH

## (undated) DEVICE — SEAL HYSTERSCOPE/OUTFLOW CHANNEL MYOSURE

## (undated) DEVICE — LIGHT SLEEVE: Brand: DEVON

## (undated) DEVICE — SKIN PREP TRAY W/CHG: Brand: MEDLINE INDUSTRIES, INC.

## (undated) DEVICE — KT PROC HYSTSCPY TB ST

## (undated) DEVICE — DEV TISS REMOV MYOSURE/XL FOR FLUENT 32CM

## (undated) DEVICE — GLV SURG SENSICARE PI ORTHO PF SZ7 LF STRL

## (undated) DEVICE — LITHOTOMY-YELLOW FINS: Brand: MEDLINE INDUSTRIES, INC.

## (undated) DEVICE — SLV SCD KN/LEN ADJ EXPRSS BLENDED MD 1P/U

## (undated) DEVICE — SOLIDIFIER LIQLOC PLS 1500CC BT

## (undated) DEVICE — THE SINGLE USE ETRAP – POLYP TRAP IS USED FOR SUCTION RETRIEVAL OF ENDOSCOPICALLY REMOVED POLYPS.: Brand: ETRAP

## (undated) DEVICE — SOL IRRG H2O PL/BG 1000ML STRL

## (undated) DEVICE — THE STERILE LIGHT HANDLE COVER IS USED WITH STERIS SURGICAL LIGHTING AND VISUALIZATION SYSTEMS.

## (undated) DEVICE — MEDI-VAC NON-CONDUCTIVE SUCTION TUBING: Brand: CARDINAL HEALTH

## (undated) DEVICE — SNAR POLYP CAPTIFLEX XS/OVL 11X2.4MM 240CM 1P/U

## (undated) DEVICE — TOWEL,OR,DSP,ST,BLUE,STD,4/PK,20PK/CS: Brand: MEDLINE

## (undated) DEVICE — CATH URETH INTRMIT ALLPURP LTX 16F RED

## (undated) DEVICE — 1000ML,PRESSURE INFUSER W/STOPCOCK: Brand: MEDLINE

## (undated) DEVICE — Device

## (undated) DEVICE — SOL NACL 0.9PCT 1000ML

## (undated) DEVICE — PATIENT RETURN ELECTRODE, SINGLE-USE, CONTACT QUALITY MONITORING, ADULT, WITH 15 FT (4.5 M) CORD. FOR PATIENTS WEIGHING OVER 33LBS. (15KG): Brand: MEGADYNE

## (undated) DEVICE — LINER SURG CANSTR SXN S/RIGD 1500CC

## (undated) DEVICE — Device: Brand: DEFENDO AIR/WATER/SUCTION AND BIOPSY VALVE